# Patient Record
Sex: MALE | Race: WHITE | NOT HISPANIC OR LATINO | Employment: FULL TIME | URBAN - METROPOLITAN AREA
[De-identification: names, ages, dates, MRNs, and addresses within clinical notes are randomized per-mention and may not be internally consistent; named-entity substitution may affect disease eponyms.]

---

## 2017-10-05 ENCOUNTER — OFFICE VISIT (OUTPATIENT)
Dept: FAMILY MEDICINE CLINIC | Facility: CLINIC | Age: 52
End: 2017-10-05

## 2017-10-05 VITALS
SYSTOLIC BLOOD PRESSURE: 140 MMHG | DIASTOLIC BLOOD PRESSURE: 85 MMHG | HEART RATE: 63 BPM | OXYGEN SATURATION: 95 % | WEIGHT: 275 LBS | TEMPERATURE: 97.5 F

## 2017-10-05 DIAGNOSIS — Z00.00 HEALTH CARE MAINTENANCE: ICD-10-CM

## 2017-10-05 DIAGNOSIS — F17.200 SMOKING: ICD-10-CM

## 2017-10-05 DIAGNOSIS — R03.0 BORDERLINE BLOOD PRESSURE: ICD-10-CM

## 2017-10-05 DIAGNOSIS — G89.29 CHRONIC RIGHT-SIDED LOW BACK PAIN WITH RIGHT-SIDED SCIATICA: Primary | ICD-10-CM

## 2017-10-05 DIAGNOSIS — M54.41 CHRONIC RIGHT-SIDED LOW BACK PAIN WITH RIGHT-SIDED SCIATICA: Primary | ICD-10-CM

## 2017-10-05 DIAGNOSIS — R53.83 OTHER FATIGUE: ICD-10-CM

## 2017-10-05 LAB
ALBUMIN SERPL-MCNC: 4 G/DL (ref 3.5–5.2)
ALBUMIN/GLOB SERPL: 1.2 G/DL
ALP SERPL-CCNC: 68 U/L (ref 39–117)
ALT SERPL-CCNC: 13 U/L (ref 1–41)
AST SERPL-CCNC: 9 U/L (ref 1–40)
BASOPHILS # BLD AUTO: 0.05 10*3/MM3 (ref 0–0.2)
BASOPHILS NFR BLD AUTO: 0.6 % (ref 0–1.5)
BILIRUB SERPL-MCNC: 0.4 MG/DL (ref 0.1–1.2)
BUN SERPL-MCNC: 9 MG/DL (ref 6–20)
BUN/CREAT SERPL: 11.3 (ref 7–25)
CALCIUM SERPL-MCNC: 9.4 MG/DL (ref 8.6–10.5)
CHLORIDE SERPL-SCNC: 102 MMOL/L (ref 98–107)
CHOLEST SERPL-MCNC: 213 MG/DL (ref 0–200)
CO2 SERPL-SCNC: 26.3 MMOL/L (ref 22–29)
CREAT SERPL-MCNC: 0.8 MG/DL (ref 0.76–1.27)
EOSINOPHIL # BLD AUTO: 0.27 10*3/MM3 (ref 0–0.7)
EOSINOPHIL NFR BLD AUTO: 3.2 % (ref 0.3–6.2)
ERYTHROCYTE [DISTWIDTH] IN BLOOD BY AUTOMATED COUNT: 15.6 % (ref 11.5–14.5)
GLOBULIN SER CALC-MCNC: 3.3 GM/DL
GLUCOSE SERPL-MCNC: 83 MG/DL (ref 65–99)
HCT VFR BLD AUTO: 41.9 % (ref 40.4–52.2)
HDLC SERPL-MCNC: 37 MG/DL (ref 40–60)
HGB BLD-MCNC: 13.2 G/DL (ref 13.7–17.6)
IMM GRANULOCYTES # BLD: 0.02 10*3/MM3 (ref 0–0.03)
IMM GRANULOCYTES NFR BLD: 0.2 % (ref 0–0.5)
LDLC SERPL CALC-MCNC: 144 MG/DL (ref 0–100)
LYMPHOCYTES # BLD AUTO: 2.73 10*3/MM3 (ref 0.9–4.8)
LYMPHOCYTES NFR BLD AUTO: 32.8 % (ref 19.6–45.3)
MCH RBC QN AUTO: 25.8 PG (ref 27–32.7)
MCHC RBC AUTO-ENTMCNC: 31.5 G/DL (ref 32.6–36.4)
MCV RBC AUTO: 81.8 FL (ref 79.8–96.2)
MONOCYTES # BLD AUTO: 0.7 10*3/MM3 (ref 0.2–1.2)
MONOCYTES NFR BLD AUTO: 8.4 % (ref 5–12)
NEUTROPHILS # BLD AUTO: 4.55 10*3/MM3 (ref 1.9–8.1)
NEUTROPHILS NFR BLD AUTO: 54.8 % (ref 42.7–76)
PLATELET # BLD AUTO: 302 10*3/MM3 (ref 140–500)
POTASSIUM SERPL-SCNC: 4.4 MMOL/L (ref 3.5–5.2)
PROT SERPL-MCNC: 7.3 G/DL (ref 6–8.5)
RBC # BLD AUTO: 5.12 10*6/MM3 (ref 4.6–6)
SODIUM SERPL-SCNC: 142 MMOL/L (ref 136–145)
TRIGL SERPL-MCNC: 159 MG/DL (ref 0–150)
TSH SERPL DL<=0.005 MIU/L-ACNC: 1.19 MIU/ML (ref 0.27–4.2)
VIT B12 SERPL-MCNC: 655 PG/ML (ref 211–946)
VLDLC SERPL CALC-MCNC: 31.8 MG/DL (ref 5–40)
WBC # BLD AUTO: 8.32 10*3/MM3 (ref 4.5–10.7)

## 2017-10-05 PROCEDURE — 99203 OFFICE O/P NEW LOW 30 MIN: CPT | Performed by: FAMILY MEDICINE

## 2017-10-05 PROCEDURE — 99406 BEHAV CHNG SMOKING 3-10 MIN: CPT | Performed by: FAMILY MEDICINE

## 2017-10-05 RX ORDER — CETIRIZINE HYDROCHLORIDE 10 MG/1
10 TABLET ORAL EVERY MORNING
COMMUNITY

## 2017-10-05 RX ORDER — OMEPRAZOLE 20 MG/1
20 CAPSULE, DELAYED RELEASE ORAL DAILY
COMMUNITY
End: 2018-01-15 | Stop reason: HOSPADM

## 2017-10-05 RX ORDER — NAPROXEN 500 MG/1
500 TABLET ORAL 2 TIMES DAILY WITH MEALS
Qty: 60 TABLET | Refills: 5 | Status: SHIPPED | OUTPATIENT
Start: 2017-10-05 | End: 2018-01-12

## 2017-10-05 RX ORDER — SILDENAFIL 100 MG/1
100 TABLET, FILM COATED ORAL DAILY PRN
COMMUNITY

## 2017-10-05 RX ORDER — TIZANIDINE 4 MG/1
4 TABLET ORAL NIGHTLY PRN
Qty: 30 TABLET | Refills: 5 | Status: SHIPPED | OUTPATIENT
Start: 2017-10-05 | End: 2021-04-13

## 2017-10-05 NOTE — PATIENT INSTRUCTIONS
Back Exercises  The following exercises strengthen the muscles that help to support the back. They also help to keep the lower back flexible. Doing these exercises can help to prevent back pain or lessen existing pain.  If you have back pain or discomfort, try doing these exercises 2-3 times each day or as told by your health care provider. When the pain goes away, do them once each day, but increase the number of times that you repeat the steps for each exercise (do more repetitions). If you do not have back pain or discomfort, do these exercises once each day or as told by your health care provider.  EXERCISES  Single Knee to Chest  Repeat these steps 3-5 times for each le. Lie on your back on a firm bed or the floor with your legs extended.  2. Bring one knee to your chest. Your other leg should stay extended and in contact with the floor.  3. Hold your knee in place by grabbing your knee or thigh.  4. Pull on your knee until you feel a gentle stretch in your lower back.  5. Hold the stretch for 10-30 seconds.  6. Slowly release and straighten your leg.  Pelvic Tilt  Repeat these steps 5-10 times:  1. Lie on your back on a firm bed or the floor with your legs extended.  2. Bend your knees so they are pointing toward the ceiling and your feet are flat on the floor.  3. Tighten your lower abdominal muscles to press your lower back against the floor. This motion will tilt your pelvis so your tailbone points up toward the ceiling instead of pointing to your feet or the floor.  4. With gentle tension and even breathing, hold this position for 5-10 seconds.  Cat-Cow  Repeat these steps until your lower back becomes more flexible:  1. Get into a hands-and-knees position on a firm surface. Keep your hands under your shoulders, and keep your knees under your hips. You may place padding under your knees for comfort.  2. Let your head hang down, and point your tailbone toward the floor so your lower back becomes  rounded like the back of a cat.  3. Hold this position for 5 seconds.  4. Slowly lift your head and point your tailbone up toward the ceiling so your back forms a sagging arch like the back of a cow.  5. Hold this position for 5 seconds.  Press-Ups  Repeat these steps 5-10 times:  1. Lie on your abdomen (face-down) on the floor.  2. Place your palms near your head, about shoulder-width apart.  3. While you keep your back as relaxed as possible and keep your hips on the floor, slowly straighten your arms to raise the top half of your body and lift your shoulders. Do not use your back muscles to raise your upper torso. You may adjust the placement of your hands to make yourself more comfortable.  4. Hold this position for 5 seconds while you keep your back relaxed.  5. Slowly return to lying flat on the floor.  Bridges  Repeat these steps 10 times:  1. Lie on your back on a firm surface.  2. Bend your knees so they are pointing toward the ceiling and your feet are flat on the floor.  3. Tighten your buttocks muscles and lift your buttocks off of the floor until your waist is at almost the same height as your knees. You should feel the muscles working in your buttocks and the back of your thighs. If you do not feel these muscles, slide your feet 1-2 inches farther away from your buttocks.  4. Hold this position for 3-5 seconds.  5. Slowly lower your hips to the starting position, and allow your buttocks muscles to relax completely.  If this exercise is too easy, try doing it with your arms crossed over your chest.  Abdominal Crunches  Repeat these steps 5-10 times:  1. Lie on your back on a firm bed or the floor with your legs extended.  2. Bend your knees so they are pointing toward the ceiling and your feet are flat on the floor.  3. Cross your arms over your chest.  4. Tip your chin slightly toward your chest without bending your neck.  5. Tighten your abdominal muscles and slowly raise your trunk (torso) high  enough to lift your shoulder blades a tiny bit off of the floor. Avoid raising your torso higher than that, because it can put too much stress on your low back and it does not help to strengthen your abdominal muscles.  6. Slowly return to your starting position.  Back Lifts  Repeat these steps 5-10 times:  1. Lie on your abdomen (face-down) with your arms at your sides, and rest your forehead on the floor.  2. Tighten the muscles in your legs and your buttocks.  3. Slowly lift your chest off of the floor while you keep your hips pressed to the floor. Keep the back of your head in line with the curve in your back. Your eyes should be looking at the floor.  4. Hold this position for 3-5 seconds.  5. Slowly return to your starting position.  SEEK MEDICAL CARE IF:  · Your back pain or discomfort gets much worse when you do an exercise.  · Your back pain or discomfort does not lessen within 2 hours after you exercise.  If you have any of these problems, stop doing these exercises right away. Do not do them again unless your health care provider says that you can.  SEEK IMMEDIATE MEDICAL CARE IF:  · You develop sudden, severe back pain. If this happens, stop doing the exercises right away. Do not do them again unless your health care provider says that you can.     This information is not intended to replace advice given to you by your health care provider. Make sure you discuss any questions you have with your health care provider.     Document Released: 01/25/2006 Document Revised: 09/07/2016 Document Reviewed: 02/11/2016  [employer] Interactive Patient Education ©2017 [employer]    Bupropion sustained-release tablets (smoking cessation)  What is this medicine?  BUPROPION (byoo PROE pee on) is used to help people quit smoking.  This medicine may be used for other purposes; ask your health care provider or pharmacist if you have questions.  COMMON BRAND NAME(S): Rica Barton  What should I tell my health care  provider before I take this medicine?  They need to know if you have any of these conditions:  -an eating disorder, such as anorexia or bulimia  -bipolar disorder or psychosis  -diabetes or high blood sugar, treated with medication  -glaucoma  -head injury or brain tumor  -heart disease, previous heart attack, or irregular heart beat  -high blood pressure  -kidney or liver disease  -seizures  -suicidal thoughts or a previous suicide attempt  -Tourette's syndrome  -weight loss  -an unusual or allergic reaction to bupropion, other medicines, foods, dyes, or preservatives  -breast-feeding  -pregnant or trying to become pregnant  How should I use this medicine?  Take this medicine by mouth with a glass of water. Follow the directions on the prescription label. You can take it with or without food. If it upsets your stomach, take it with food. Do not cut, crush or chew this medicine. Take your medicine at regular intervals. If you take this medicine more than once a day, take your second dose at least 8 hours after you take your first dose. To limit difficulty in sleeping, avoid taking this medicine at bedtime. Do not take your medicine more often than directed. Do not stop taking this medicine suddenly except upon the advice of your doctor. Stopping this medicine too quickly may cause serious side effects.  A special MedGuide will be given to you by the pharmacist with each prescription and refill. Be sure to read this information carefully each time.  Talk to your pediatrician regarding the use of this medicine in children. Special care may be needed.  Overdosage: If you think you have taken too much of this medicine contact a poison control center or emergency room at once.  NOTE: This medicine is only for you. Do not share this medicine with others.  What if I miss a dose?  If you miss a dose, skip the missed dose and take your next tablet at the regular time. There should be at least 8 hours between doses. Do not  take double or extra doses.  What may interact with this medicine?  Do not take this medicine with any of the following medications:  -linezolid  -MAOIs like Azilect, Carbex, Eldepryl, Marplan, Nardil, and Parnate  -methylene blue (injected into a vein)  -other medicines that contain bupropion like Wellbutrin  This medicine may also interact with the following medications:  -alcohol  -certain medicines for anxiety or sleep  -certain medicines for blood pressure like metoprolol, propranolol  -certain medicines for depression or psychotic disturbances  -certain medicines for HIV or AIDS like efavirenz, lopinavir, nelfinavir, ritonavir  -certain medicines for irregular heart beat like propafenone, flecainide  -certain medicines for Parkinson's disease like amantadine, levodopa  -certain medicines for seizures like carbamazepine, phenytoin, phenobarbital  -cimetidine  -clopidogrel  -cyclophosphamide  -furazolidone  -isoniazid  -nicotine  -orphenadrine  -procarbazine  -steroid medicines like prednisone or cortisone  -stimulant medicines for attention disorders, weight loss, or to stay awake  -tamoxifen  -theophylline  -thiotepa  -ticlopidine  -tramadol  -warfarin  This list may not describe all possible interactions. Give your health care provider a list of all the medicines, herbs, non-prescription drugs, or dietary supplements you use. Also tell them if you smoke, drink alcohol, or use illegal drugs. Some items may interact with your medicine.  What should I watch for while using this medicine?  Visit your doctor or health care professional for regular checks on your progress. This medicine should be used together with a patient support program. It is important to participate in a behavioral program, counseling, or other support program that is recommended by your health care professional.  Patients and their families should watch out for new or worsening thoughts of suicide or depression. Also watch out for sudden  changes in feelings such as feeling anxious, agitated, panicky, irritable, hostile, aggressive, impulsive, severely restless, overly excited and hyperactive, or not being able to sleep. If this happens, especially at the beginning of treatment or after a change in dose, call your health care professional.  Avoid alcoholic drinks while taking this medicine. Drinking excessive alcoholic beverages, using sleeping or anxiety medicines, or quickly stopping the use of these agents while taking this medicine may increase your risk for a seizure.  Do not drive or use heavy machinery until you know how this medicine affects you. This medicine can impair your ability to perform these tasks.  Do not take this medicine close to bedtime. It may prevent you from sleeping.  Your mouth may get dry. Chewing sugarless gum or sucking hard candy, and drinking plenty of water may help. Contact your doctor if the problem does not go away or is severe.  Do not use nicotine patches or chewing gum without the advice of your doctor or health care professional while taking this medicine. You may need to have your blood pressure taken regularly if your doctor recommends that you use both nicotine and this medicine together.  What side effects may I notice from receiving this medicine?  Side effects that you should report to your doctor or health care professional as soon as possible:  -allergic reactions like skin rash, itching or hives, swelling of the face, lips, or tongue  -breathing problems  -changes in vision  -confusion  -fast or irregular heartbeat  -hallucinations  -increased blood pressure  -redness, blistering, peeling or loosening of the skin, including inside the mouth  -seizures  -suicidal thoughts or other mood changes  -unusually weak or tired  -vomiting  Side effects that usually do not require medical attention (report to your doctor or health care professional if they continue or are bothersome):  -change in sex drive or  performance  -constipation  -headache  -loss of appetite  -nausea  -tremors  -weight loss  This list may not describe all possible side effects. Call your doctor for medical advice about side effects. You may report side effects to FDA at 2-690-HMV-7013.  Where should I keep my medicine?  Keep out of the reach of children.  Store at room temperature between 20 and 25 degrees C (68 and 77 degrees F). Protect from light. Keep container tightly closed. Throw away any unused medicine after the expiration date.  NOTE: This sheet is a summary. It may not cover all possible information. If you have questions about this medicine, talk to your doctor, pharmacist, or health care provider.     © 2017, [employer]. (2014-08-15 10:55:10)    Calorie Counting for Weight Loss  Calories are energy you get from the things you eat and drink. Your body uses this energy to keep you going throughout the day. The number of calories you eat affects your weight. When you eat more calories than your body needs, your body stores the extra calories as fat. When you eat fewer calories than your body needs, your body burns fat to get the energy it needs.  Calorie counting means keeping track of how many calories you eat and drink each day. If you make sure to eat fewer calories than your body needs, you should lose weight. In order for calorie counting to work, you will need to eat the number of calories that are right for you in a day to lose a healthy amount of weight per week. A healthy amount of weight to lose per week is usually 1-2 lb (0.5-0.9 kg). A dietitian can determine how many calories you need in a day and give you suggestions on how to reach your calorie goal.   WHAT IS MY MY PLAN?  My goal is to have __________ calories per day.   If I have this many calories per day, I should lose around __________ pounds per week.  WHAT DO I NEED TO KNOW ABOUT CALORIE COUNTING?  In order to meet your daily calorie goal, you will need  to:  · Find out how many calories are in each food you would like to eat. Try to do this before you eat.  · Decide how much of the food you can eat.  · Write down what you ate and how many calories it had. Doing this is called keeping a food log.  WHERE DO I FIND CALORIE INFORMATION?  The number of calories in a food can be found on a Nutrition Facts label. Note that all the information on a label is based on a specific serving of the food. If a food does not have a Nutrition Facts label, try to look up the calories online or ask your dietitian for help.  HOW DO I DECIDE HOW MUCH TO EAT?  To decide how much of the food you can eat, you will need to consider both the number of calories in one serving and the size of one serving. This information can be found on the Nutrition Facts label. If a food does not have a Nutrition Facts label, look up the information online or ask your dietitian for help.  Remember that calories are listed per serving. If you choose to have more than one serving of a food, you will have to multiply the calories per serving by the amount of servings you plan to eat. For example, the label on a package of bread might say that a serving size is 1 slice and that there are 90 calories in a serving. If you eat 1 slice, you will have eaten 90 calories. If you eat 2 slices, you will have eaten 180 calories.  HOW DO I KEEP A FOOD LOG?  After each meal, record the following information in your food log:  · What you ate.  · How much of it you ate.  · How many calories it had.  · Then, add up your calories.  Keep your food log near you, such as in a small notebook in your pocket. Another option is to use a mobile martin or website. Some programs will calculate calories for you and show you how many calories you have left each time you add an item to the log.  WHAT ARE SOME CALORIE COUNTING TIPS?  · Use your calories on foods and drinks that will fill you up and not leave you hungry. Some examples of this  include foods like nuts and nut butters, vegetables, lean proteins, and high-fiber foods (more than 5 g fiber per serving).  · Eat nutritious foods and avoid empty calories. Empty calories are calories you get from foods or beverages that do not have many nutrients, such as candy and soda. It is better to have a nutritious high-calorie food (such as an avocado) than a food with few nutrients (such as a bag of chips).  · Know how many calories are in the foods you eat most often. This way, you do not have to look up how many calories they have each time you eat them.  · Look out for foods that may seem like low-calorie foods but are really high-calorie foods, such as baked goods, soda, and fat-free candy.  · Pay attention to calories in drinks. Drinks such as sodas, specialty coffee drinks, alcohol, and juices have a lot of calories yet do not fill you up. Choose low-calorie drinks like water and diet drinks.  · Focus your calorie counting efforts on higher calorie items. Logging the calories in a garden salad that contains only vegetables is less important than calculating the calories in a milk shake.  · Find a way of tracking calories that works for you. Get creative. Most people who are successful find ways to keep track of how much they eat in a day, even if they do not count every calorie.  WHAT ARE SOME PORTION CONTROL TIPS?  · Know how many calories are in a serving. This will help you know how many servings of a certain food you can have.  · Use a measuring cup to measure serving sizes. This is helpful when you start out. With time, you will be able to estimate serving sizes for some foods.  · Take some time to put servings of different foods on your favorite plates, bowls, and cups so you know what a serving looks like.  · Try not to eat straight from a bag or box. Doing this can lead to overeating. Put the amount you would like to eat in a cup or on a plate to make sure you are eating the right  "portion.  · Use smaller plates, glasses, and bowls to prevent overeating. This is a quick and easy way to practice portion control. If your plate is smaller, less food can fit on it.  · Try not to multitask while eating, such as watching TV or using your computer. If it is time to eat, sit down at a table and enjoy your food. Doing this will help you to start recognizing when you are full. It will also make you more aware of what and how much you are eating.  HOW CAN I CALORIE COUNT WHEN EATING OUT?  · Ask for smaller portion sizes or child-sized portions.  · Consider sharing an entree and sides instead of getting your own entree.  · If you get your own entree, eat only half. Ask for a box at the beginning of your meal and put the rest of your entree in it so you are not tempted to eat it.  · Look for the calories on the menu. If calories are listed, choose the lower calorie options.  · Choose dishes that include vegetables, fruits, whole grains, low-fat dairy products, and lean protein. Focusing on smart food choices from each of the 5 food groups can help you stay on track at restaurants.  · Choose items that are boiled, broiled, grilled, or steamed.  · Choose water, milk, unsweetened iced tea, or other drinks without added sugars. If you want an alcoholic beverage, choose a lower calorie option. For example, a regular lauren can have up to 700 calories and a glass of wine has around 150.  · Stay away from items that are buttered, battered, fried, or served with cream sauce. Items labeled \"crispy\" are usually fried, unless stated otherwise.  · Ask for dressings, sauces, and syrups on the side. These are usually very high in calories, so do not eat much of them.  · Watch out for salads. Many people think salads are a healthy option, but this is often not the case. Many salads come with shankar, fried chicken, lots of cheese, fried chips, and dressing. All of these items have a lot of calories. If you want a salad, " choose a garden salad and ask for grilled meats or steak. Ask for the dressing on the side, or ask for olive oil and vinegar or lemon to use as dressing.  · Estimate how many servings of a food you are given. For example, a serving of cooked rice is ½ cup or about the size of half a tennis ball or one cupcake wrapper. Knowing serving sizes will help you be aware of how much food you are eating at restaurants. The list below tells you how big or small some common portion sizes are based on everyday objects.    1 oz--4 stacked dice.    3 oz--1 deck of cards.    1 tsp--1 dice.    1 Tbsp--½ a Ping-Pong ball.    2 Tbsp--1 Ping-Pong ball.    ½ cup--1 tennis ball or 1 cupcake wrapper.    1 cup--1 baseball.     This information is not intended to replace advice given to you by your health care provider. Make sure you discuss any questions you have with your health care provider.     Document Released: 12/18/2006 Document Revised: 01/08/2016 Document Reviewed: 10/23/2014  [employer] Interactive Patient Education ©2017 [employer]      Exercising to Lose Weight  Exercising can help you to lose weight. In order to lose weight through exercise, you need to do vigorous-intensity exercise. You can tell that you are exercising with vigorous intensity if you are breathing very hard and fast and cannot hold a conversation while exercising.  Moderate-intensity exercise helps to maintain your current weight. You can tell that you are exercising at a moderate level if you have a higher heart rate and faster breathing, but you are still able to hold a conversation.  HOW OFTEN SHOULD I EXERCISE?  Choose an activity that you enjoy and set realistic goals. Your health care provider can help you to make an activity plan that works for you. Exercise regularly as directed by your health care provider. This may include:  · Doing resistance training twice each week, such as:    Push-ups.    Sit-ups.    Lifting weights.    Using resistance  bands.  · Doing a given intensity of exercise for a given amount of time. Choose from these options:    150 minutes of moderate-intensity exercise every week.    75 minutes of vigorous-intensity exercise every week.    A mix of moderate-intensity and vigorous-intensity exercise every week.  Children, pregnant women, people who are out of shape, people who are overweight, and older adults may need to consult a health care provider for individual recommendations. If you have any sort of medical condition, be sure to consult your health care provider before starting a new exercise program.  WHAT ARE SOME ACTIVITIES THAT CAN HELP ME TO LOSE WEIGHT?   · Walking at a rate of at least 4.5 miles an hour.  · Jogging or running at a rate of 5 miles per hour.  · Biking at a rate of at least 10 miles per hour.  · Lap swimming.  · Roller-skating or in-line skating.  · Cross-country skiing.  · Vigorous competitive sports, such as football, basketball, and soccer.  · Jumping rope.  · Aerobic dancing.  HOW CAN I BE MORE ACTIVE IN MY DAY-TO-DAY ACTIVITIES?  · Use the stairs instead of the elevator.  · Take a walk during your lunch break.  · If you drive, park your car farther away from work or school.  · If you take public transportation, get off one stop early and walk the rest of the way.  · Make all of your phone calls while standing up and walking around.  · Get up, stretch, and walk around every 30 minutes throughout the day.  WHAT GUIDELINES SHOULD I FOLLOW WHILE EXERCISING?  · Do not exercise so much that you hurt yourself, feel dizzy, or get very short of breath.  · Consult your health care provider prior to starting a new exercise program.  · Wear comfortable clothes and shoes with good support.  · Drink plenty of water while you exercise to prevent dehydration or heat stroke. Body water is lost during exercise and must be replaced.  · Work out until you breathe faster and your heart beats faster.     This information is  not intended to replace advice given to you by your health care provider. Make sure you discuss any questions you have with your health care provider.     Document Released: 01/20/2012 Document Revised: 01/08/2016 Document Reviewed: 05/21/2015  [employer] Interactive Patient Education ©2017 [employer]

## 2017-10-05 NOTE — PROGRESS NOTES
Subjective   Miles Munoz is a 52 y.o. male. Presents today for   Chief Complaint   Patient presents with   • Establish Care     legs cramping and has a bad back.  Lesion on lt forearm needs checked.       New patient here to establish care.    Rash   This is a new problem. The current episode started more than 1 month ago. The problem is unchanged. Location: left forearm wart;  unchaning;  irritated adn scaling; The rash is characterized by dryness. Associated symptoms include fatigue and shortness of breath (Since gained weight). Pertinent negatives include no vomiting. Past treatments include nothing. The treatment provided no relief.   Back Pain   This is a chronic (Reports told DDD L4-L5) problem. Episode onset: 15 years. The problem occurs intermittently. The problem has been waxing and waning since onset. The pain is present in the lumbar spine. The quality of the pain is described as aching (stiffness). The pain radiates to the right thigh (occly sciatica). The pain is moderate. The symptoms are aggravated by twisting, position and bending. Associated symptoms include leg pain, numbness (right 4th/5th toes) and tingling. Pertinent negatives include no abdominal pain, chest pain, paresthesias or weakness. He has tried NSAIDs for the symptoms. The treatment provided mild relief.   Nicotine Dependence   Presents for initial visit. Symptoms include fatigue. Preferred tobacco types include cigarettes. His urge triggers include company of smokers. (Afraid to quit smoking as may gain more wait.) He started smoking when he was 11-12 years old. Past treatments include varenicline and nicotine patch (Reports chantix caused vivid dreams.). Miles is thinking about quitting. Miles has tried to quit 5 or more (At one point quit for 18 months to 24 monrths) times.   +snores, fatigue, especially with weight gain;       Preventative:  Has not had colonoscopy  Urology checks prostate, sees annually    Review of Systems    Constitutional: Positive for fatigue.   Respiratory: Positive for shortness of breath (Since gained weight).    Cardiovascular: Negative for chest pain, palpitations and leg swelling.   Gastrointestinal: Negative for abdominal pain, anal bleeding, blood in stool, nausea and vomiting.   Musculoskeletal: Positive for back pain.   Skin: Positive for rash.   Neurological: Positive for tingling and numbness (right 4th/5th toes). Negative for dizziness, syncope, weakness, light-headedness and paresthesias.       The following portions of the patient's history were reviewed and updated as appropriate: allergies, current medications, past family history, past medical history, past social history, past surgical history and problem list.    Past Medical History:   Diagnosis Date   • Allergic rhinitis    • GERD (gastroesophageal reflux disease)    • Renal stone      Past Surgical History:   Procedure Laterality Date   • CYSTOSCOPY      for renal stones   • VASECTOMY     • WISDOM TOOTH EXTRACTION       Family History   Problem Relation Age of Onset   • Heart failure Mother      43 yoa   • Obesity Mother      reports very severe morbid obesity     Social History     Social History   • Marital status:      Spouse name: N/A   • Number of children: N/A   • Years of education: N/A     Social History Main Topics   • Smoking status: Current Every Day Smoker     Packs/day: 1.00     Types: Cigarettes     Start date: 10/5/1976   • Smokeless tobacco: Never Used   • Alcohol use Yes      Comment: occ   • Drug use: None   • Sexual activity: Not Asked     Other Topics Concern   • None     Social History Narrative   • None       There is no problem list on file for this patient.      Allergies   Allergen Reactions   • Prednisone Other (See Comments)       No current outpatient prescriptions on file prior to visit.     No current facility-administered medications on file prior to visit.        Objective   Vitals:    10/05/17 1120  10/05/17 1204   BP: 156/90 140/85   Pulse: 63    Temp: 97.5 °F (36.4 °C)    SpO2: 95%    Weight: 275 lb (125 kg)        Physical Exam   Constitutional: He appears well-developed and well-nourished.   HENT:   Head: Normocephalic and atraumatic.   Right Ear: External ear normal.   Left Ear: External ear normal.   Mouth/Throat: Oropharynx is clear and moist.   Neck: Neck supple. No JVD present. No thyromegaly present.   Cardiovascular: Normal rate, regular rhythm and normal heart sounds.  Exam reveals no gallop and no friction rub.    No murmur heard.  Pulmonary/Chest: Effort normal and breath sounds normal. No respiratory distress. He has no wheezes. He has no rales.   Abdominal: Soft. Bowel sounds are normal. He exhibits no distension. There is no tenderness. There is no rebound and no guarding.   Musculoskeletal: He exhibits no edema.   Neurological: He is alert.   Skin: Skin is warm and dry.   Psychiatric: He has a normal mood and affect. His behavior is normal.   Nursing note and vitals reviewed.      Assessment/Plan   Miles was seen today for establish care.    Diagnoses and all orders for this visit:    Chronic right-sided low back pain with right-sided sciatica  -     naproxen (NAPROSYN) 500 MG tablet; Take 1 tablet by mouth 2 (Two) Times a Day With Meals.  -     tiZANidine (ZANAFLEX) 4 MG tablet; Take 1 tablet by mouth At Night As Needed for Muscle Spasms.    Borderline blood pressure  -     Comprehensive Metabolic Panel    Smoking    Other fatigue  -     CBC & Differential  -     Comprehensive Metabolic Panel  -     Lipid Panel  -     TSH  -     Vitamin B12  -     Ambulatory Referral to Sleep Medicine    Health care maintenance  -     Comprehensive Metabolic Panel  -     Lipid Panel      -I advised the patient of the risks in continuing to use tobacco, and I provided this patient with smoking cessation Rx.  I also discussed how to quit smoking and the patient has expressed the willingness to quit.    -During  this visit, I spent 3-10 mintues counseling the patient regarding smoking cessation.   -back pain - nsaid, MR;  Stretches;  COnsider PT  -fatigue - labs as indicated, will call with results  -wart - monitor, return if changing  -BP borderline - work on DASH diet, exercise and weight loss  -recommend c-scope, declines at this time;  D/w at f/u again.       -Follow up: 6 months       Current Outpatient Prescriptions:   •  cetirizine (zyrTEC) 10 MG tablet, Take 10 mg by mouth Daily., Disp: , Rfl:   •  naproxen (NAPROSYN) 500 MG tablet, Take 1 tablet by mouth 2 (Two) Times a Day With Meals., Disp: 60 tablet, Rfl: 5  •  omeprazole (priLOSEC) 20 MG capsule, Take 20 mg by mouth Daily., Disp: , Rfl:   •  sildenafil (VIAGRA) 100 MG tablet, Take 100 mg by mouth Daily As Needed for erectile dysfunction., Disp: , Rfl:   •  tiZANidine (ZANAFLEX) 4 MG tablet, Take 1 tablet by mouth At Night As Needed for Muscle Spasms., Disp: 30 tablet, Rfl: 5

## 2017-10-06 NOTE — PROGRESS NOTES
Call and mail copy of results to patient.  Kidney, liver function normal  B12 and thyroid normal  Cholesterol elevated, work on diet, exercise and weight loss  Blood counts ok, but hgb borderline for age, would check FOBT x 3 and for colorectal cancer screening.

## 2017-10-17 ENCOUNTER — CLINICAL SUPPORT (OUTPATIENT)
Dept: FAMILY MEDICINE CLINIC | Facility: CLINIC | Age: 52
End: 2017-10-17

## 2017-10-17 DIAGNOSIS — Z12.11 SCREENING FOR COLORECTAL CANCER: Primary | ICD-10-CM

## 2017-10-17 DIAGNOSIS — Z12.12 SCREENING FOR COLORECTAL CANCER: Primary | ICD-10-CM

## 2017-10-17 DIAGNOSIS — Z12.11 COLON CANCER SCREENING: Primary | ICD-10-CM

## 2017-10-17 LAB
EXPIRATION DATE 2: ABNORMAL
EXPIRATION DATE 3: ABNORMAL
EXPIRATION DATE: ABNORMAL
GASTROCULT GAST QL: POSITIVE
HEMOCCULT SP2 STL QL: NEGATIVE
HEMOCCULT SP3 STL QL: NEGATIVE
Lab: ABNORMAL

## 2017-10-17 PROCEDURE — 82274 ASSAY TEST FOR BLOOD FECAL: CPT | Performed by: FAMILY MEDICINE

## 2017-10-17 NOTE — PROGRESS NOTES
Call results to patient.  1 of 3 FOBT +, recommend referral to GI for colonoscopy for colon cancer screening.

## 2017-11-16 ENCOUNTER — PREP FOR SURGERY (OUTPATIENT)
Dept: OTHER | Facility: HOSPITAL | Age: 52
End: 2017-11-16

## 2017-11-16 DIAGNOSIS — R19.5 OCCULT BLOOD POSITIVE STOOL: ICD-10-CM

## 2017-11-16 DIAGNOSIS — K21.9 GASTROESOPHAGEAL REFLUX DISEASE, ESOPHAGITIS PRESENCE NOT SPECIFIED: Primary | ICD-10-CM

## 2017-11-16 RX ORDER — SODIUM CHLORIDE, SODIUM LACTATE, POTASSIUM CHLORIDE, CALCIUM CHLORIDE 600; 310; 30; 20 MG/100ML; MG/100ML; MG/100ML; MG/100ML
30 INJECTION, SOLUTION INTRAVENOUS CONTINUOUS
Status: CANCELLED | OUTPATIENT
Start: 2017-11-16

## 2017-11-21 ENCOUNTER — APPOINTMENT (OUTPATIENT)
Dept: SLEEP MEDICINE | Facility: HOSPITAL | Age: 52
End: 2017-11-21
Attending: PSYCHIATRY & NEUROLOGY

## 2017-12-01 PROBLEM — K21.9 GASTROESOPHAGEAL REFLUX DISEASE: Status: ACTIVE | Noted: 2017-12-01

## 2017-12-01 PROBLEM — R19.5 OCCULT BLOOD POSITIVE STOOL: Status: ACTIVE | Noted: 2017-12-01

## 2018-01-11 ENCOUNTER — TELEPHONE (OUTPATIENT)
Dept: FAMILY MEDICINE CLINIC | Facility: CLINIC | Age: 53
End: 2018-01-11

## 2018-01-11 DIAGNOSIS — Z12.11 ENCOUNTER FOR SCREENING COLONOSCOPY: Primary | ICD-10-CM

## 2018-01-12 RX ORDER — ACETAMINOPHEN 325 MG/1
650 TABLET ORAL EVERY 6 HOURS PRN
COMMUNITY
End: 2022-05-05

## 2018-01-15 ENCOUNTER — ANESTHESIA EVENT (OUTPATIENT)
Dept: GASTROENTEROLOGY | Facility: HOSPITAL | Age: 53
End: 2018-01-15

## 2018-01-15 ENCOUNTER — HOSPITAL ENCOUNTER (OUTPATIENT)
Facility: HOSPITAL | Age: 53
Setting detail: HOSPITAL OUTPATIENT SURGERY
Discharge: HOME | End: 2018-01-15
Attending: INTERNAL MEDICINE | Admitting: INTERNAL MEDICINE

## 2018-01-15 ENCOUNTER — ANESTHESIA (OUTPATIENT)
Dept: GASTROENTEROLOGY | Facility: HOSPITAL | Age: 53
End: 2018-01-15

## 2018-01-15 VITALS
SYSTOLIC BLOOD PRESSURE: 139 MMHG | RESPIRATION RATE: 20 BRPM | TEMPERATURE: 98.4 F | BODY MASS INDEX: 41.76 KG/M2 | HEART RATE: 86 BPM | WEIGHT: 266.06 LBS | DIASTOLIC BLOOD PRESSURE: 75 MMHG | HEIGHT: 67 IN | OXYGEN SATURATION: 93 %

## 2018-01-15 DIAGNOSIS — K21.9 GASTROESOPHAGEAL REFLUX DISEASE, ESOPHAGITIS PRESENCE NOT SPECIFIED: ICD-10-CM

## 2018-01-15 DIAGNOSIS — R19.5 OCCULT BLOOD POSITIVE STOOL: ICD-10-CM

## 2018-01-15 PROCEDURE — 88305 TISSUE EXAM BY PATHOLOGIST: CPT | Performed by: INTERNAL MEDICINE

## 2018-01-15 PROCEDURE — 25010000002 PROPOFOL 10 MG/ML EMULSION: Performed by: ANESTHESIOLOGY

## 2018-01-15 PROCEDURE — 45381 COLONOSCOPY SUBMUCOUS NJX: CPT | Performed by: INTERNAL MEDICINE

## 2018-01-15 PROCEDURE — 88312 SPECIAL STAINS GROUP 1: CPT | Performed by: INTERNAL MEDICINE

## 2018-01-15 PROCEDURE — 45380 COLONOSCOPY AND BIOPSY: CPT | Performed by: INTERNAL MEDICINE

## 2018-01-15 PROCEDURE — 25010000002 GLUCAGON (HUMAN RECOMBINANT) 1 MG RECONSTITUTED SOLUTION: Performed by: INTERNAL MEDICINE

## 2018-01-15 PROCEDURE — 43239 EGD BIOPSY SINGLE/MULTIPLE: CPT | Performed by: INTERNAL MEDICINE

## 2018-01-15 PROCEDURE — 45385 COLONOSCOPY W/LESION REMOVAL: CPT | Performed by: INTERNAL MEDICINE

## 2018-01-15 RX ORDER — SODIUM CHLORIDE, SODIUM LACTATE, POTASSIUM CHLORIDE, CALCIUM CHLORIDE 600; 310; 30; 20 MG/100ML; MG/100ML; MG/100ML; MG/100ML
30 INJECTION, SOLUTION INTRAVENOUS CONTINUOUS
Status: DISCONTINUED | OUTPATIENT
Start: 2018-01-15 | End: 2018-01-15 | Stop reason: HOSPADM

## 2018-01-15 RX ORDER — PROPOFOL 10 MG/ML
VIAL (ML) INTRAVENOUS CONTINUOUS PRN
Status: DISCONTINUED | OUTPATIENT
Start: 2018-01-15 | End: 2018-01-15 | Stop reason: SURG

## 2018-01-15 RX ORDER — PANTOPRAZOLE SODIUM 40 MG/1
40 TABLET, DELAYED RELEASE ORAL DAILY
Qty: 60 TABLET | Refills: 11 | Status: SHIPPED | OUTPATIENT
Start: 2018-01-15 | End: 2018-04-09 | Stop reason: CLARIF

## 2018-01-15 RX ORDER — SODIUM CHLORIDE 0.9 % (FLUSH) 0.9 %
1-10 SYRINGE (ML) INJECTION AS NEEDED
Status: DISCONTINUED | OUTPATIENT
Start: 2018-01-15 | End: 2018-01-15 | Stop reason: HOSPADM

## 2018-01-15 RX ORDER — PROPOFOL 10 MG/ML
VIAL (ML) INTRAVENOUS AS NEEDED
Status: DISCONTINUED | OUTPATIENT
Start: 2018-01-15 | End: 2018-01-15 | Stop reason: SURG

## 2018-01-15 RX ORDER — LIDOCAINE HYDROCHLORIDE 20 MG/ML
INJECTION, SOLUTION INFILTRATION; PERINEURAL AS NEEDED
Status: DISCONTINUED | OUTPATIENT
Start: 2018-01-15 | End: 2018-01-15 | Stop reason: SURG

## 2018-01-15 RX ADMIN — SODIUM CHLORIDE, POTASSIUM CHLORIDE, SODIUM LACTATE AND CALCIUM CHLORIDE: 600; 310; 30; 20 INJECTION, SOLUTION INTRAVENOUS at 10:00

## 2018-01-15 RX ADMIN — LIDOCAINE HYDROCHLORIDE 40 MG: 20 INJECTION, SOLUTION INFILTRATION; PERINEURAL at 10:00

## 2018-01-15 RX ADMIN — PROPOFOL 100 MCG/KG/MIN: 10 INJECTION, EMULSION INTRAVENOUS at 10:00

## 2018-01-15 RX ADMIN — SODIUM CHLORIDE, POTASSIUM CHLORIDE, SODIUM LACTATE AND CALCIUM CHLORIDE 30 ML/HR: 600; 310; 30; 20 INJECTION, SOLUTION INTRAVENOUS at 09:05

## 2018-01-15 RX ADMIN — PROPOFOL 100 MG: 10 INJECTION, EMULSION INTRAVENOUS at 10:00

## 2018-01-15 NOTE — ANESTHESIA PREPROCEDURE EVALUATION
" Anesthesia Evaluation            Airway   Mallampati: II  TM distance: >3 FB  Neck ROM: full  no difficulty expected  Dental - normal exam     Pulmonary    (+) a smoker Current, sleep apnea (probable),     ROS comment: Dry cough related to \"allergies\"  Cardiovascular     (-) hypertension, dysrhythmias, angina, BLACK, hyperlipidemia      Neuro/Psych  GI/Hepatic/Renal/Endo    (+) obesity,  GERD, renal disease stones,   (-) diabetes    Musculoskeletal     Abdominal    Substance History      OB/GYN          Other                                              Anesthesia Plan    ASA 3     MAC     intravenous induction   Anesthetic plan and risks discussed with patient.      "

## 2018-01-15 NOTE — H&P
"[facility]  Pre Procedure History & Physical    Chief Complaint:   Time for my colonoscopy    Subjective     HPI:   52 y.o. male presenting for evaluation of +FOBT.  1/3 positive done though PCP.  Pt denies over GI bleeding.  He has long standing GERD relatively well controlled on daily Prilosec.  No family hx of colon CA or polyps.      Past Medical History:   Past Medical History:   Diagnosis Date   • Allergic rhinitis    • GERD (gastroesophageal reflux disease)    • Renal stone    • Seasonal allergies        Family History:  Family History   Problem Relation Age of Onset   • Heart failure Mother      43 yoa   • Obesity Mother      reports very severe morbid obesity   • Malig Hyperthermia Neg Hx        Social History:   reports that he has been smoking Cigarettes.  He started smoking about 41 years ago. He has been smoking about 1.00 pack per day. He has never used smokeless tobacco. He reports that he drinks alcohol. He reports that he does not use illicit drugs.    Medications:   Prescriptions Prior to Admission   Medication Sig Dispense Refill Last Dose   • acetaminophen (TYLENOL) 325 MG tablet Take 650 mg by mouth Every 6 (Six) Hours As Needed for Mild Pain .   Past Week at Unknown time   • cetirizine (zyrTEC) 10 MG tablet Take 10 mg by mouth Daily.   1/14/2018 at 0900   • omeprazole (priLOSEC) 20 MG capsule Take 20 mg by mouth Daily.   1/14/2018 at 0900   • sildenafil (VIAGRA) 100 MG tablet Take 100 mg by mouth Daily As Needed for erectile dysfunction.   Past Week at Unknown time   • tiZANidine (ZANAFLEX) 4 MG tablet Take 1 tablet by mouth At Night As Needed for Muscle Spasms. 30 tablet 5 Past Week at Unknown time       Allergies:  Prednisone    ROS:    Pertinent items are noted in HPI     Objective     Blood pressure 159/72, pulse 78, temperature 98.6 °F (37 °C), temperature source Oral, resp. rate 16, height 170.2 cm (67\"), weight 121 kg (266 lb 1 oz), SpO2 96 %.    Physical Exam "   Constitutional: Pt is oriented to person, place, and time and well-developed, well-nourished, and in no distress.   HENT:   Mouth/Throat: Oropharynx is clear and moist.   Neck: Normal range of motion. Neck supple.   Cardiovascular: Normal rate, regular rhythm and normal heart sounds.    Pulmonary/Chest: Effort normal and breath sounds normal. No respiratory distress. No  wheezes.   Abdominal: Soft. Bowel sounds are normal.   Skin: Skin is warm and dry.   Psychiatric: Mood, memory, affect and judgment normal.     Assessment/Plan     Diagnosis:  Heartburn  +FOBT    Anticipated Surgical Procedure:  EGD and Colonoscopy    The risks, benefits, and alternatives of this procedure have been discussed with the patient or the responsible party- the patient understands and agrees to proceed.

## 2018-01-15 NOTE — DISCHARGE INSTRUCTIONS
For the next 24 hours patient needs to be with a responsible adult.    For 24 hours DO NOT drive, operate machinery, appliances, drink alcohol, make important decisions or sign legal documents.    Start with a light or bland diet and advance to regular diet as tolerated.    Follow recommendations on procedure report provided by your doctor.    Call Dr Thompson for problems [phone]    Problems may include but not limited to: large amounts of bleeding, trouble breathing, repeated vomiting, severe unrelieved pain, fever or chills.

## 2018-01-15 NOTE — ANESTHESIA POSTPROCEDURE EVALUATION
"Patient: Miles Munoz    Procedure Summary     Date Anesthesia Start Anesthesia Stop Room / Location    01/15/18 1003 1053  [facility] /  [facility]       Procedure Diagnosis Surgeon Provider    ESOPHAGOGASTRODUODENOSCOPY WITH BIOPSY (N/A Esophagus); COLONOSCOPY WITH POLYPECTOMY TO CECUM HOT SNARE (N/A ) Occult blood positive stool; Gastroesophageal reflux disease, esophagitis presence not specified  (Occult blood positive stool [R19.5]; Gastroesophageal reflux disease, esophagitis presence not specified [K21.9]) MD Karen Anthony MD          Anesthesia Type: MAC  Last vitals  BP   112/76 (01/15/18 1047)   Temp   37 °C (98.6 °F) (01/15/18 0839)   Pulse   70 (01/15/18 1047)   Resp   20 (01/15/18 1047)     SpO2   94 % (01/15/18 1047)     Post Anesthesia Care and Evaluation    Patient location during evaluation: PACU  Patient participation: complete - patient participated  Level of consciousness: awake and alert  Pain management: adequate  Airway patency: patent  Anesthetic complications: No anesthetic complications    Cardiovascular status: acceptable  Respiratory status: acceptable  Hydration status: acceptable    Comments: /76 (BP Location: Left arm, Patient Position: Lying)  Pulse 70  Temp 37 °C (98.6 °F) (Oral)   Resp 20  Ht 170.2 cm (67\")  Wt 121 kg (266 lb 1 oz)  SpO2 94%  BMI 41.67 kg/m2      "

## 2018-01-15 NOTE — PLAN OF CARE
Problem: Patient Care Overview (Adult)  Goal: Plan of Care Review  Outcome: Ongoing (interventions implemented as appropriate)   01/15/18 0848   Coping/Psychosocial Response Interventions   Plan Of Care Reviewed With patient   Patient Care Overview   Progress improving   Outcome Evaluation   Outcome Summary/Follow up Plan vss, ready for or     Goal: Adult Individualization and Mutuality  Outcome: Ongoing (interventions implemented as appropriate)   01/15/18 0848   Individualization   Patient Specific Preferences goes by kaushik     Goal: Discharge Needs Assessment  Outcome: Ongoing (interventions implemented as appropriate)   01/15/18 0848   Discharge Needs Assessment   Concerns To Be Addressed denies needs/concerns at this time   Discharge Disposition home or self-care   Living Environment   Transportation Available car;family or friend will provide       Problem: GI Endoscopy (Adult)  Intervention: Monitor/Manage Procedure Recovery   01/15/18 0848   Respiratory Interventions   Airway/Ventilation Management airway patency maintained   Coping/Psychosocial Interventions   Environmental Support calm environment promoted   Activity   Activity Type activity adjusted per tolerance   Cardiac Interventions   Warming Thermoregulation Maintenance warm blankets applied     Intervention: Prevent Tika-procedural Injury   01/15/18 0848   Positioning   Positioning side lying, left   Head Of Bed (HOB) Position HOB elevated       Goal: Signs and Symptoms of Listed Potential Problems Will be Absent or Manageable (GI Endoscopy)  Outcome: Ongoing (interventions implemented as appropriate)   01/15/18 0848   GI Endoscopy   Problems Assessed (GI Endoscopy) all   Problems Present (GI Endoscopy) none

## 2018-01-16 LAB
CYTO UR: NORMAL
LAB AP CASE REPORT: NORMAL
LAB AP INTRADEPARTMENTAL CONSULT: NORMAL
Lab: NORMAL
PATH REPORT.FINAL DX SPEC: NORMAL
PATH REPORT.GROSS SPEC: NORMAL

## 2018-01-17 PROBLEM — D12.6 TUBULOVILLOUS ADENOMA OF COLON: Status: ACTIVE | Noted: 2018-01-17

## 2018-02-09 ENCOUNTER — TELEPHONE (OUTPATIENT)
Dept: FAMILY MEDICINE CLINIC | Facility: CLINIC | Age: 53
End: 2018-02-09

## 2018-02-09 RX ORDER — METHYLPREDNISOLONE 4 MG/1
TABLET ORAL
Qty: 32 TABLET | Refills: 0 | Status: SHIPPED | OUTPATIENT
Start: 2018-02-09 | End: 2018-04-09

## 2018-02-13 ENCOUNTER — PREP FOR SURGERY (OUTPATIENT)
Dept: OTHER | Facility: HOSPITAL | Age: 53
End: 2018-02-13

## 2018-02-13 ENCOUNTER — TELEPHONE (OUTPATIENT)
Dept: GASTROENTEROLOGY | Facility: CLINIC | Age: 53
End: 2018-02-13

## 2018-02-13 DIAGNOSIS — K25.9 MULTIPLE GASTRIC ULCERS: Primary | ICD-10-CM

## 2018-02-13 NOTE — TELEPHONE ENCOUNTER
Patient called, advised Dr. Thompson does want to repeat his EGD in 8 weeks. Advised a  will be calling to schedule this. He verb understanding and is agreeable to the plan. Order placed for repeat EGd.

## 2018-02-13 NOTE — TELEPHONE ENCOUNTER
Called pt and advised that per Dr Thompson: the biopsies from his upper scope came back OK and the polyps that were removed during the colonoscopy were not cancerous but were considered potentially precancerous. Advised that MD recommends to repeat the c/s in 3 years. Pt verb understanding and requests for his next scope, not to use Moviprep. Pt states MD told his wife after his scopes that the upper scope needed to be repeated in 90 days to check on ulcers. Advised will check with MD and if he verifies will have scheduling call back to arrange scope. Advised that is is common practice to repeat scope in 3 months to check with healing of ulcers, but need to verify with MD first as I do not have an order form him. Pt verb understanding.     Health Maintenance updated to reflect c/s due 1/2021.

## 2018-02-13 NOTE — TELEPHONE ENCOUNTER
----- Message from Leopoldo Thompson MD sent at 1/28/2018  8:17 AM EST -----  Path from EGD ok  Colon polyps were pre-cancerous    Recall colonoscopy in 3 years

## 2018-02-15 PROBLEM — K25.9 MULTIPLE GASTRIC ULCERS: Status: ACTIVE | Noted: 2018-02-15

## 2018-02-26 ENCOUNTER — OFFICE VISIT (OUTPATIENT)
Dept: SLEEP MEDICINE | Facility: HOSPITAL | Age: 53
End: 2018-02-26
Attending: PSYCHIATRY & NEUROLOGY

## 2018-02-26 VITALS
HEART RATE: 68 BPM | DIASTOLIC BLOOD PRESSURE: 78 MMHG | BODY MASS INDEX: 43.63 KG/M2 | WEIGHT: 278 LBS | HEIGHT: 67 IN | SYSTOLIC BLOOD PRESSURE: 168 MMHG

## 2018-02-26 DIAGNOSIS — E66.01 OBESITY, MORBID, BMI 40.0-49.9 (HCC): ICD-10-CM

## 2018-02-26 DIAGNOSIS — R06.83 SNORES: ICD-10-CM

## 2018-02-26 DIAGNOSIS — G47.63 SLEEP-RELATED BRUXISM: ICD-10-CM

## 2018-02-26 DIAGNOSIS — R29.818 SUSPECTED SLEEP APNEA: Primary | ICD-10-CM

## 2018-02-26 DIAGNOSIS — R06.81 WITNESSED APNEIC SPELLS: ICD-10-CM

## 2018-02-26 PROCEDURE — G0463 HOSPITAL OUTPT CLINIC VISIT: HCPCS

## 2018-02-26 RX ORDER — POLYETHYLENE GLYCOL 3350, SODIUM SULFATE, SODIUM CHLORIDE, POTASSIUM CHLORIDE, ASCORBIC ACID, SODIUM ASCORBATE 7.5-2.691G
KIT ORAL
Refills: 0 | COMMUNITY
Start: 2017-12-31 | End: 2018-10-03

## 2018-02-26 RX ORDER — METHOCARBAMOL 500 MG/1
TABLET, FILM COATED ORAL
Refills: 1 | COMMUNITY
Start: 2017-12-31 | End: 2018-10-03

## 2018-03-13 ENCOUNTER — HOSPITAL ENCOUNTER (OUTPATIENT)
Dept: SLEEP MEDICINE | Facility: HOSPITAL | Age: 53
Discharge: HOME | End: 2018-03-13
Attending: INTERNAL MEDICINE | Admitting: INTERNAL MEDICINE

## 2018-03-13 DIAGNOSIS — E66.01 OBESITY, MORBID, BMI 40.0-49.9 (HCC): ICD-10-CM

## 2018-03-13 DIAGNOSIS — G47.63 SLEEP-RELATED BRUXISM: ICD-10-CM

## 2018-03-13 DIAGNOSIS — R06.83 SNORES: ICD-10-CM

## 2018-03-13 DIAGNOSIS — R29.818 SUSPECTED SLEEP APNEA: ICD-10-CM

## 2018-03-13 DIAGNOSIS — R06.81 WITNESSED APNEIC SPELLS: ICD-10-CM

## 2018-03-13 PROCEDURE — 95806 SLEEP STUDY UNATT&RESP EFFT: CPT

## 2018-04-02 ENCOUNTER — TELEPHONE (OUTPATIENT)
Dept: SLEEP MEDICINE | Facility: HOSPITAL | Age: 53
End: 2018-04-02

## 2018-04-02 NOTE — TELEPHONE ENCOUNTER
Spoke with patient about sleep study results, sending order to [facility] per patient, scheduled follow up appointment.

## 2018-04-09 ENCOUNTER — OFFICE VISIT (OUTPATIENT)
Dept: FAMILY MEDICINE CLINIC | Facility: CLINIC | Age: 53
End: 2018-04-09

## 2018-04-09 VITALS
BODY MASS INDEX: 44.26 KG/M2 | SYSTOLIC BLOOD PRESSURE: 138 MMHG | WEIGHT: 282 LBS | OXYGEN SATURATION: 94 % | HEIGHT: 67 IN | HEART RATE: 63 BPM | DIASTOLIC BLOOD PRESSURE: 78 MMHG | TEMPERATURE: 97.9 F

## 2018-04-09 DIAGNOSIS — K25.9 MULTIPLE GASTRIC ULCERS: ICD-10-CM

## 2018-04-09 DIAGNOSIS — D12.6 TUBULOVILLOUS ADENOMA OF COLON: Primary | ICD-10-CM

## 2018-04-09 DIAGNOSIS — G47.33 OSA (OBSTRUCTIVE SLEEP APNEA): ICD-10-CM

## 2018-04-09 PROCEDURE — 99213 OFFICE O/P EST LOW 20 MIN: CPT | Performed by: FAMILY MEDICINE

## 2018-04-09 RX ORDER — OMEPRAZOLE 40 MG/1
40 CAPSULE, DELAYED RELEASE ORAL DAILY
COMMUNITY
End: 2018-07-26 | Stop reason: ALTCHOICE

## 2018-04-09 RX ORDER — GABAPENTIN 300 MG/1
300 CAPSULE ORAL 2 TIMES DAILY
Refills: 2 | COMMUNITY
Start: 2018-03-02 | End: 2019-04-23 | Stop reason: SDUPTHER

## 2018-04-09 NOTE — PROGRESS NOTES
Subjective   Miles Munoz is a 52 y.o. male. Presents today for   Chief Complaint   Patient presents with   • Follow-up     pt here for 6 month follow up visit. he has low energy. he said he is getting his cpap machine tomorrow.       History of Present Illness  Patient here for f/u;  Still fatigued, but was diagnosed with MARIA A to receive cpap tomorrow.  Excited to start as tired all of the time.  Hopes to try lose weight.  Still smoking, trying to quit.    Since last seen had c-scope and EGD;  Had tubulovillous adenoma low grade dysplasia;  Had superficial gastric ulcers on EGD, no active bleeding.      No cp/soa;  Borderline bp;  Monitoring.    Review of Systems   Respiratory: Negative for shortness of breath.    Cardiovascular: Negative for chest pain.       The following portions of the patient's history were reviewed and updated as appropriate: allergies, current medications, past medical history, past social history and problem list.    Patient Active Problem List   Diagnosis   • Occult blood positive stool   • Gastroesophageal reflux disease   • Tubulovillous adenoma of colon   • Multiple gastric ulcers   • Suspected sleep apnea   • Snores   • Witnessed apneic spells   • Obesity, morbid, BMI 40.0-49.9   • Sleep-related bruxism       Allergies   Allergen Reactions   • Prednisone Other (See Comments)     Causes muscles to constrict        Current Outpatient Prescriptions on File Prior to Visit   Medication Sig Dispense Refill   • acetaminophen (TYLENOL) 325 MG tablet Take 650 mg by mouth Every 6 (Six) Hours As Needed for Mild Pain .     • cetirizine (zyrTEC) 10 MG tablet Take 10 mg by mouth Daily.     • methocarbamol (ROBAXIN) 500 MG tablet TAKE 1 TABLET BY MOUTH EVERY TWELVE HOURS AS NEEDED for muscle relaxation  1   • sildenafil (VIAGRA) 100 MG tablet Take 100 mg by mouth Daily As Needed for erectile dysfunction.     • [DISCONTINUED] pantoprazole (PROTONIX) 40 MG EC tablet Take 1 tablet by mouth Daily. 60  "tablet 11   • MOVIPREP 100 g reconstituted solution powder USE PER PACKAGE DIRECTIONS  0   • tiZANidine (ZANAFLEX) 4 MG tablet Take 1 tablet by mouth At Night As Needed for Muscle Spasms. 30 tablet 5   • [DISCONTINUED] methylPREDNISolone (MEDROL) 4 MG tablet 6 tabs po x 2 days, then 4 tabs po x 2 days, then 3 tabs po x 2 days, then 2 tabs po x 2 days, then 1 tab po x 2 days 32 tablet 0     No current facility-administered medications on file prior to visit.        Objective   Vitals:    04/09/18 1813   BP: 138/78   BP Location: Right arm   Patient Position: Sitting   Cuff Size: Large Adult   Pulse: 63   Temp: 97.9 °F (36.6 °C)   TempSrc: Oral   SpO2: 94%   Weight: 128 kg (282 lb)   Height: 170.2 cm (67.01\")       Physical Exam   Constitutional: He appears well-developed and well-nourished.   HENT:   Head: Normocephalic and atraumatic.   Neck: Neck supple. No JVD present. No thyromegaly present.   Cardiovascular: Normal rate, regular rhythm and normal heart sounds.  Exam reveals no gallop and no friction rub.    No murmur heard.  Pulmonary/Chest: Effort normal and breath sounds normal. No respiratory distress. He has no wheezes. He has no rales.   Abdominal: Soft. Bowel sounds are normal. He exhibits no distension. There is no tenderness. There is no rebound and no guarding.   Musculoskeletal: He exhibits no edema.   Neurological: He is alert.   Skin: Skin is warm and dry.   Psychiatric: He has a normal mood and affect. His behavior is normal.   Nursing note and vitals reviewed.      Assessment/Plan   Miles was seen today for follow-up.    Diagnoses and all orders for this visit:    Tubulovillous adenoma of colon    Multiple gastric ulcers    MARIA A (obstructive sleep apnea)    -repeat c-scope as per Gi  -f/u gi for ulcers  -bp borderline, recommend quit smokign and work on weight loss  -maria a - start cpap;  Work on weight loss         -Follow up: 6 months       Current Outpatient Prescriptions:   •  acetaminophen " (TYLENOL) 325 MG tablet, Take 650 mg by mouth Every 6 (Six) Hours As Needed for Mild Pain ., Disp: , Rfl:   •  cetirizine (zyrTEC) 10 MG tablet, Take 10 mg by mouth Daily., Disp: , Rfl:   •  gabapentin (NEURONTIN) 300 MG capsule, Take 300 mg by mouth 3 (Three) Times a Day., Disp: , Rfl: 2  •  methocarbamol (ROBAXIN) 500 MG tablet, TAKE 1 TABLET BY MOUTH EVERY TWELVE HOURS AS NEEDED for muscle relaxation, Disp: , Rfl: 1  •  omeprazole (priLOSEC) 40 MG capsule, Take 40 mg by mouth Daily., Disp: , Rfl:   •  sildenafil (VIAGRA) 100 MG tablet, Take 100 mg by mouth Daily As Needed for erectile dysfunction., Disp: , Rfl:   •  MOVIPREP 100 g reconstituted solution powder, USE PER PACKAGE DIRECTIONS, Disp: , Rfl: 0  •  tiZANidine (ZANAFLEX) 4 MG tablet, Take 1 tablet by mouth At Night As Needed for Muscle Spasms., Disp: 30 tablet, Rfl: 5

## 2018-06-11 ENCOUNTER — OFFICE VISIT (OUTPATIENT)
Dept: SLEEP MEDICINE | Facility: HOSPITAL | Age: 53
End: 2018-06-11
Attending: INTERNAL MEDICINE

## 2018-06-11 VITALS
SYSTOLIC BLOOD PRESSURE: 150 MMHG | HEART RATE: 59 BPM | WEIGHT: 283 LBS | HEIGHT: 67 IN | BODY MASS INDEX: 44.42 KG/M2 | DIASTOLIC BLOOD PRESSURE: 81 MMHG

## 2018-06-11 DIAGNOSIS — Z99.89 OSA ON CPAP: Primary | ICD-10-CM

## 2018-06-11 DIAGNOSIS — G47.34 SLEEP RELATED HYPOXIA: ICD-10-CM

## 2018-06-11 DIAGNOSIS — G47.33 OSA ON CPAP: Primary | ICD-10-CM

## 2018-06-11 DIAGNOSIS — G47.30 HYPERSOMNIA WITH SLEEP APNEA: ICD-10-CM

## 2018-06-11 DIAGNOSIS — G47.10 HYPERSOMNIA WITH SLEEP APNEA: ICD-10-CM

## 2018-06-11 DIAGNOSIS — E66.01 OBESITY, MORBID, BMI 40.0-49.9 (HCC): ICD-10-CM

## 2018-06-11 PROCEDURE — G0463 HOSPITAL OUTPT CLINIC VISIT: HCPCS

## 2018-06-11 NOTE — PROGRESS NOTES
Sleep Disorder Follow Up    Patient Name: Miles Munoz  Age/Sex: 52 y.o. male  : 1965  MRN: [id number]    Date of Encounter Visit: 18  Referring Provider: Echo Marti MD  Place of Service: [facility]  Patient Care Team:  Aly Espinosa DO as PCP - General (Family Medicine)    PROBLEM LIST:  1. Severe obstructive sleep apnea on CPAP  2. Significant sleep related hypoxia  3. Sleep related bruxism  4. Obesity     History of Present Illness:  Miles Munoz is a 52 y.o. male who was last seen in the office on 18 for initial evaluation of suspected sleep apnea with complaints of snoring, witnessed apneas and daytime sleepiness. He had a home sleep study on 3/13/18 that showed an AHI of 30.7 that increased to 68.7 while in the left position and 75.9% of sleep time spent snoring. He was also noted to have significant sleep related hypoxia with bozena desaturation to 75% and 41.5 minute spent below 89%. Auto CPAP was ordered at 8-20 cmH20 with overnight oximetry on CPAP to be completed.      Since last visit, he has been using the CPAP and reports some mask issues and would like the try a more traditional full face mask.  Patient is on CPAP and uses it every night with no complaints from the mask or the pressure.  Patient uses a full face mask (Maddie view), which does not fit well. Patient reports excessive air leak.   Patient's equipment supplier is [employer] and last mask replacement was about 3 months ago.  Patient sleeps better and has a deeper sleep with the CPAP and feels more energy during the day time.  Current CPAP setting 8-20 cm H20.  [place] Sleepiness Scale (ESS) is 11, which is up from 10 initially.  Compliance download was reviewed and documented below.  Weight is up 5 pounds since last visit.  Other comorbidities include obesity.     Review of Systems:   A ten-system review was conducted and was negative except for nasal congestion and shortness of breath.    Please refer to the follow up sleep questionnaire page one for details.    Past Medical History:  Past medical, surgical, social, and family history, except as mentioned above, was unchanged from the last visit.     Past Medical History:   Diagnosis Date   • Allergic rhinitis    • GERD (gastroesophageal reflux disease)    • Renal stone    • Seasonal allergies        Past Surgical History:   Procedure Laterality Date   • COLONOSCOPY W/ POLYPECTOMY N/A 1/15/2018    Procedure: COLONOSCOPY WITH POLYPECTOMY TO CECUM HOT SNARE;  Surgeon: Leopoldo Thompson MD;  Location: [facility] ENDOSCOPY;  Service:    • CYSTOSCOPY      for renal stones   • ENDOSCOPY N/A 1/15/2018    Procedure: ESOPHAGOGASTRODUODENOSCOPY WITH BIOPSY;  Surgeon: Leopoldo Thompson MD;  Location: [facility] ENDOSCOPY;  Service:    • VASECTOMY     • WISDOM TOOTH EXTRACTION         Home Medications:     Current Outpatient Prescriptions:   •  acetaminophen (TYLENOL) 325 MG tablet, Take 650 mg by mouth Every 6 (Six) Hours As Needed for Mild Pain ., Disp: , Rfl:   •  cetirizine (zyrTEC) 10 MG tablet, Take 10 mg by mouth Daily., Disp: , Rfl:   •  gabapentin (NEURONTIN) 300 MG capsule, Take 300 mg by mouth 3 (Three) Times a Day., Disp: , Rfl: 2  •  methocarbamol (ROBAXIN) 500 MG tablet, TAKE 1 TABLET BY MOUTH EVERY TWELVE HOURS AS NEEDED for muscle relaxation, Disp: , Rfl: 1  •  MOVIPREP 100 g reconstituted solution powder, USE PER PACKAGE DIRECTIONS, Disp: , Rfl: 0  •  omeprazole (priLOSEC) 40 MG capsule, Take 40 mg by mouth Daily., Disp: , Rfl:   •  sildenafil (VIAGRA) 100 MG tablet, Take 100 mg by mouth Daily As Needed for erectile dysfunction., Disp: , Rfl:   •  tiZANidine (ZANAFLEX) 4 MG tablet, Take 1 tablet by mouth At Night As Needed for Muscle Spasms., Disp: 30 tablet, Rfl: 5    Allergies:  Allergies   Allergen Reactions   • Prednisone Other (See Comments)     Causes muscles to constrict        Past Social History:  Social History     Social History   •  "Marital status:      Social History Main Topics   • Smoking status: Current Every Day Smoker     Packs/day: 1.00     Types: Cigarettes     Start date: 10/5/1976   • Smokeless tobacco: Never Used   • Alcohol use Yes      Comment: occ   • Drug use: No   • Sexual activity: Defer     Other Topics Concern   • Not on file       Past Family History:  Family History   Problem Relation Age of Onset   • Heart failure Mother         43 yoa   • Obesity Mother         reports very severe morbid obesity   • Malig Hyperthermia Neg Hx          Objective:   Done and documented on sleep disorders center physical examination sheet, please refer to hand written note on the chart for details about the other pertinent negative findings.    Vital Signs:   Visit Vitals  /81   Pulse 59   Ht 170.2 cm (67\")   Wt 128 kg (283 lb)   BMI 44.32 kg/m²     Wt Readings from Last 3 Encounters:   06/11/18 128 kg (283 lb)   04/09/18 128 kg (282 lb)   02/26/18 126 kg (278 lb)          Physical Exam:   General: AAOx3. Normal mood and affect.   HEENT:  Conjunctiva normal.  PERRLA.  Moist mucous membranes.  Septum midline. Mallampati 4 airway. Macroglossia.  Redundant lateral pharyngeal tissue   Neck: Neck supple. Trachea midline.  Normal thyroid.   LUNGS: Non-labored breathing. CTAB.  No wheezing.  HEART: regular rate and rhythm. No murmur. Normal s1/s2.  EXTREMITIES: 1+ BLE edema.No cyanosis or clubbing. Normal gait.    Diagnostic Data:  HST 3/13/2018:   This is a home sleep study done on 3/13/18, total recording time was 423.5 minutes with total monitoring time of 415.0 minutes.  Respiratory summary: Severe MARIA A with AHI of 30.7 with positional component pain worst in the left position with AHI of 68.7.  Oxygenation: Significant hypoxemia was bozena desaturation to 75% with 41.5 minutes spent in the hypoxemic range.  Patient had clusters of significant hypoxemia that are likely to be REM sleep related however no way to confirm without the EEG " recording  Snoring: Patient had severe snoring at 75.9% of total recording time  Recommendations: auto CPAP 8-20 cmH20 and repeat overnight oximetry on CPAP.     Compliance download from 5/12/18 to 6/10/18 showed 100% compliant with average use of Excess hours and 56 minutes.  Residual AHI 1.0 on auto CPAP 8-20 cm H2O with 18 seconds of average ×7 with large air leak.  Majority of the time of (90%) pressure was adequate below 15 cm H2O with a mean pressure of 12.2 cm H2O.        Assessment and Plan:       ICD-10-CM ICD-9-CM   1. MARIA A on CPAP G47.33 327.23    Z99.89 V46.8   2. Hypersomnia with sleep apnea G47.10 780.53    G47.30    3. Obesity, morbid, BMI 40.0-49.9 E66.01 278.01   4. Sleep related hypoxia G47.34 327.24       Recommendations:     Patient is feeling better on the CPAP and plans to continue to use it. He does have clinical improvement and subjective improvement and CPAP is recommended to be continued.     Adjust the CPAP to 11-20 and continue to use it whenever asleep.     Will request overnight oximetry on CPAP that was already ordered.     Work on the weight loss.    Follow up yearly or sooner as needed.    No orders of the defined types were placed in this encounter.    Return in about 1 year (around 6/11/2019).    Echo Marti MD   [facility]   06/11/18  3:50 PM    Dictated utilizing Dragon dictation

## 2018-06-12 ENCOUNTER — TELEPHONE (OUTPATIENT)
Dept: SLEEP MEDICINE | Facility: HOSPITAL | Age: 53
End: 2018-06-12

## 2018-06-12 NOTE — TELEPHONE ENCOUNTER
Tech called DME company as pt had not yet had overnight oximetry study done. Per rep from [facility] was a overlook on there end and patient was never scheduled. Rep stated would reach out to patient today to try and schedule. MAB

## 2018-06-29 ENCOUNTER — OFFICE VISIT (OUTPATIENT)
Dept: FAMILY MEDICINE CLINIC | Facility: CLINIC | Age: 53
End: 2018-06-29

## 2018-06-29 VITALS
DIASTOLIC BLOOD PRESSURE: 82 MMHG | WEIGHT: 279 LBS | SYSTOLIC BLOOD PRESSURE: 132 MMHG | OXYGEN SATURATION: 96 % | BODY MASS INDEX: 43.79 KG/M2 | HEIGHT: 67 IN | HEART RATE: 63 BPM | TEMPERATURE: 98.1 F

## 2018-06-29 DIAGNOSIS — R53.83 FATIGUE, UNSPECIFIED TYPE: Primary | ICD-10-CM

## 2018-06-29 DIAGNOSIS — R09.89 GLOBUS SENSATION: ICD-10-CM

## 2018-06-29 DIAGNOSIS — R05.9 COUGH: ICD-10-CM

## 2018-06-29 PROCEDURE — 99213 OFFICE O/P EST LOW 20 MIN: CPT | Performed by: NURSE PRACTITIONER

## 2018-06-29 NOTE — PROGRESS NOTES
Subjective   Miles Munoz is a 52 y.o. male.  Cough, fatigue. Started about 2 weeks ago. He has no respiratory symptoms. He states this cough seems to be worse when he sits up or lying down at night. He does feel like there is something in his throat and sometimes feels like food gets stuck about midway down the esophagus and when if finally goes down, is kind of painful.  Wife concerned he might have Graves disease because of the symptoms. He did have EGD and colonoscopy back in Jan with some gastric ulcers and precancerous polyps. Currently on Omeprazole daily. This has helped with the reflux.     Cough   This is a new problem. The current episode started 1 to 4 weeks ago. The problem has been unchanged. The problem occurs constantly (except at night). The cough is non-productive. Associated symptoms include shortness of breath. Pertinent negatives include no wheezing. Exacerbated by: sitting. Treatments tried: dayquil. The treatment provided no relief.        The following portions of the patient's history were reviewed and updated as appropriate: allergies, current medications, past family history, past medical history, past social history, past surgical history and problem list.    Review of Systems   Constitutional: Positive for fatigue.   Respiratory: Positive for cough and shortness of breath. Negative for wheezing.    Cardiovascular: Negative.    Gastrointestinal: Positive for GERD.        Feels like something in throat  Food sometimes gets stuck         Objective   Physical Exam   Constitutional: Vital signs are normal. He appears well-developed and well-nourished. He is cooperative.   HENT:   Right Ear: Hearing and tympanic membrane normal.   Left Ear: Hearing and tympanic membrane normal.   Nose: Nose normal.   Mouth/Throat: Uvula is midline, oropharynx is clear and moist and mucous membranes are normal.   Neck: Carotid bruit is not present. No thyroid mass and no thyromegaly present.   Cardiovascular:  "Normal rate, regular rhythm and normal heart sounds.    Pulmonary/Chest: Effort normal and breath sounds normal.   Abdominal: Soft. Normal appearance and bowel sounds are normal. He exhibits no distension. There is no tenderness.   Neurological: He is alert.   Nursing note and vitals reviewed.    Vitals:    06/29/18 1102   BP: 132/82   Pulse: 63   Temp: 98.1 °F (36.7 °C)   TempSrc: Oral   SpO2: 96%   Weight: 127 kg (279 lb)   Height: 170.2 cm (67.01\")       Assessment/Plan   Diagnoses and all orders for this visit:    Fatigue, unspecified type  -     TSH  -     T4, Free  -     T3, Free    Cough  -     FL upper gi w air contrast and kub    Globus sensation  -     FL upper gi w air contrast and kub      GERD: Hold the omeprazole and given samples of Dexilant 60 mg. Take this instead. Let me know how this is working. Will send to pharmacy if helping.   Will check thyroid although not as likely given symptoms  Schedule UGI possible esophageal spasms, hiatal hernia??  Follow up after testing.       "

## 2018-06-30 LAB
T3FREE SERPL-MCNC: 3.5 PG/ML (ref 2–4.4)
T4 FREE SERPL-MCNC: 1.36 NG/DL (ref 0.93–1.7)
TSH SERPL DL<=0.005 MIU/L-ACNC: 1.14 MIU/ML (ref 0.27–4.2)

## 2018-07-03 ENCOUNTER — TELEPHONE (OUTPATIENT)
Dept: FAMILY MEDICINE CLINIC | Facility: CLINIC | Age: 53
End: 2018-07-03

## 2018-07-03 NOTE — TELEPHONE ENCOUNTER
----- Message from ALIA García sent at 7/2/2018  1:06 PM EDT -----  Call the patient on the lab results. The thyroid tests came back normal.

## 2018-07-09 ENCOUNTER — DOCUMENTATION (OUTPATIENT)
Dept: SLEEP MEDICINE | Facility: HOSPITAL | Age: 53
End: 2018-07-09

## 2018-07-09 NOTE — PROGRESS NOTES
Overnight oximetry on room air done on 6/14/18  Patient had oxygenation in the lower 90s but still within normal range most of the night with only 0.6 minutes with sats below or equal to 88%  There is rare scattered desaturation with overall acceptable profile with no significant hypoxemia or clustering of desaturations

## 2018-07-10 ENCOUNTER — TELEPHONE (OUTPATIENT)
Dept: SLEEP MEDICINE | Facility: HOSPITAL | Age: 53
End: 2018-07-10

## 2018-07-11 ENCOUNTER — TELEPHONE (OUTPATIENT)
Dept: FAMILY MEDICINE CLINIC | Facility: CLINIC | Age: 53
End: 2018-07-11

## 2018-07-12 RX ORDER — DEXLANSOPRAZOLE 60 MG/1
60 CAPSULE, DELAYED RELEASE ORAL DAILY
Qty: 30 CAPSULE | Refills: 2 | Status: SHIPPED | OUTPATIENT
Start: 2018-07-12 | End: 2018-07-18 | Stop reason: SDUPTHER

## 2018-07-18 ENCOUNTER — TELEPHONE (OUTPATIENT)
Dept: FAMILY MEDICINE CLINIC | Facility: CLINIC | Age: 53
End: 2018-07-18

## 2018-07-18 RX ORDER — DEXLANSOPRAZOLE 60 MG/1
60 CAPSULE, DELAYED RELEASE ORAL DAILY
Qty: 90 CAPSULE | Refills: 1 | Status: SHIPPED | OUTPATIENT
Start: 2018-07-18 | End: 2018-07-26 | Stop reason: ALTCHOICE

## 2018-07-19 ENCOUNTER — HOSPITAL ENCOUNTER (OUTPATIENT)
Dept: GENERAL RADIOLOGY | Facility: HOSPITAL | Age: 53
Discharge: HOME | End: 2018-07-19
Admitting: NURSE PRACTITIONER

## 2018-07-19 ENCOUNTER — TELEPHONE (OUTPATIENT)
Dept: FAMILY MEDICINE CLINIC | Facility: CLINIC | Age: 53
End: 2018-07-19

## 2018-07-19 PROCEDURE — 74247: CPT

## 2018-07-19 NOTE — TELEPHONE ENCOUNTER
----- Message from ALIA García sent at 7/19/2018  3:13 PM EDT -----  Call the patient on the results of the UGI. Does not appear to have reflux and no aspiration. There is a small hiatal hernia. No mass seen. Continue the Dexilant if that is still helping. Thanks.

## 2018-07-26 RX ORDER — LANSOPRAZOLE 30 MG/1
30 CAPSULE, DELAYED RELEASE ORAL 2 TIMES DAILY
Qty: 60 CAPSULE | Refills: 2 | Status: SHIPPED | OUTPATIENT
Start: 2018-07-26 | End: 2018-08-22 | Stop reason: ALTCHOICE

## 2018-08-22 ENCOUNTER — OFFICE VISIT (OUTPATIENT)
Dept: FAMILY MEDICINE CLINIC | Facility: CLINIC | Age: 53
End: 2018-08-22

## 2018-08-22 VITALS
TEMPERATURE: 98 F | HEIGHT: 67 IN | OXYGEN SATURATION: 98 % | WEIGHT: 276 LBS | HEART RATE: 61 BPM | DIASTOLIC BLOOD PRESSURE: 82 MMHG | SYSTOLIC BLOOD PRESSURE: 138 MMHG | BODY MASS INDEX: 43.32 KG/M2

## 2018-08-22 DIAGNOSIS — K21.00 GASTROESOPHAGEAL REFLUX DISEASE WITH ESOPHAGITIS: ICD-10-CM

## 2018-08-22 DIAGNOSIS — J01.90 ACUTE SINUSITIS, RECURRENCE NOT SPECIFIED, UNSPECIFIED LOCATION: ICD-10-CM

## 2018-08-22 DIAGNOSIS — J40 BRONCHITIS: Primary | ICD-10-CM

## 2018-08-22 PROCEDURE — 99213 OFFICE O/P EST LOW 20 MIN: CPT | Performed by: NURSE PRACTITIONER

## 2018-08-22 RX ORDER — AMOXICILLIN 875 MG/1
875 TABLET, COATED ORAL 2 TIMES DAILY
Qty: 20 TABLET | Refills: 0 | Status: SHIPPED | OUTPATIENT
Start: 2018-08-22 | End: 2018-10-03

## 2018-08-22 RX ORDER — OMEPRAZOLE 40 MG/1
40 CAPSULE, DELAYED RELEASE ORAL DAILY
Qty: 90 CAPSULE | Refills: 1 | Status: SHIPPED | OUTPATIENT
Start: 2018-08-22

## 2018-08-22 RX ORDER — GUAIFENESIN 600 MG/1
1200 TABLET, EXTENDED RELEASE ORAL 2 TIMES DAILY
Qty: 60 TABLET | Refills: 1 | Status: SHIPPED | OUTPATIENT
Start: 2018-08-22 | End: 2018-10-03

## 2018-08-22 NOTE — PROGRESS NOTES
Subjective   Miles Munoz is a 52 y.o. male. He has sore throat, clearing throat a lot. Has hx of GERD and small HH. Has chest congestion, cough - non productive.  It started about 2-3 weeks ago. Feels like he has congestion but it won't come out. Has some nasal congestion as well. Thinks some of this may be allergies cause is worse when he comes home and they have a cat.   Taking the Prilosec 20 mg because the insurance would not cover the Dexilant. He has been buying OTC because the insurance also would not cover the prevacid. Still having some symptoms of GERD but this medication does help. Not sure if the sore throat is from the GERD, the sinuses or the chest congestion. He is a smoker but is trying to cut down.      Cough   This is a new problem. The current episode started 1 to 4 weeks ago. The problem has been gradually worsening. The problem occurs constantly. The cough is non-productive. Associated symptoms include heartburn, a sore throat, shortness of breath (little bit) and wheezing. Pertinent negatives include no chills or fever. Risk factors for lung disease include smoking/tobacco exposure. Treatments tried: Zyrtec. The treatment provided mild relief. His past medical history is significant for environmental allergies.   Heartburn   He complains of coughing, globus sensation, heartburn, a sore throat and wheezing. This is a chronic problem. The current episode started more than 1 month ago. The problem occurs constantly. The problem has been waxing and waning. The heartburn is located in the substernum. The heartburn is of mild intensity. The heartburn does not wake him from sleep. The heartburn does not limit his activity. The heartburn doesn't change with position. The symptoms are aggravated by certain foods and lying down. Risk factors include smoking/tobacco exposure. He has tried a PPI for the symptoms. The treatment provided moderate relief. Past procedures include a UGI.        The following  "portions of the patient's history were reviewed and updated as appropriate: allergies, current medications, past family history, past medical history, past social history, past surgical history and problem list.    Review of Systems   Constitutional: Negative for chills and fever.   HENT: Positive for congestion and sore throat.    Respiratory: Positive for cough, shortness of breath (little bit) and wheezing.    Cardiovascular: Negative.    Gastrointestinal: Positive for GERD.   Allergic/Immunologic: Positive for environmental allergies.       Objective   Physical Exam   Constitutional: Vital signs are normal. He appears well-developed and well-nourished. He is cooperative.   HENT:   Right Ear: Hearing and tympanic membrane normal.   Left Ear: Hearing and tympanic membrane normal.   Nose: Mucosal edema, rhinorrhea and congestion present.   Mouth/Throat: Uvula is midline and mucous membranes are normal. Posterior oropharyngeal erythema present.   Cardiovascular: Normal rate, regular rhythm and normal heart sounds.    Pulmonary/Chest: Effort normal. He has wheezes (scattered).   Neurological: He is alert.   Nursing note and vitals reviewed.    Vitals:    08/22/18 1300   BP: 138/82   Pulse: 61   Temp: 98 °F (36.7 °C)   TempSrc: Oral   SpO2: 98%   Weight: 125 kg (276 lb)   Height: 170.2 cm (67.01\")       Assessment/Plan   Miles was seen today for uri and heartburn.    Diagnoses and all orders for this visit:    Bronchitis  -     guaiFENesin (MUCINEX) 600 MG 12 hr tablet; Take 2 tablets by mouth 2 (Two) Times a Day.  -     amoxicillin (AMOXIL) 875 MG tablet; Take 1 tablet by mouth 2 (Two) Times a Day.    Gastroesophageal reflux disease with esophagitis  -     omeprazole (priLOSEC) 40 MG capsule; Take 1 capsule by mouth Daily.    Acute sinusitis, recurrence not specified, unspecified location  -     amoxicillin (AMOXIL) 875 MG tablet; Take 1 tablet by mouth 2 (Two) Times a Day.    Bronchitis/Sinusitis: Start " amoxicillin/mucinex. Lots of fluids. Continue cetirizine. If continues with sore throat, may need to see ENT for scope as he is smoker.    GERD: Increase prilosec to 40 mg daily. New script sent for 90 day supply.

## 2018-10-03 ENCOUNTER — OFFICE VISIT (OUTPATIENT)
Dept: FAMILY MEDICINE CLINIC | Facility: CLINIC | Age: 53
End: 2018-10-03

## 2018-10-03 VITALS
SYSTOLIC BLOOD PRESSURE: 124 MMHG | HEART RATE: 76 BPM | BODY MASS INDEX: 43.32 KG/M2 | OXYGEN SATURATION: 99 % | WEIGHT: 276 LBS | DIASTOLIC BLOOD PRESSURE: 78 MMHG | TEMPERATURE: 98.2 F | HEIGHT: 67 IN

## 2018-10-03 DIAGNOSIS — J01.10 ACUTE NON-RECURRENT FRONTAL SINUSITIS: Primary | ICD-10-CM

## 2018-10-03 DIAGNOSIS — Z71.6 ENCOUNTER FOR SMOKING CESSATION COUNSELING: ICD-10-CM

## 2018-10-03 DIAGNOSIS — Z72.0 TOBACCO ABUSE: ICD-10-CM

## 2018-10-03 PROCEDURE — 99213 OFFICE O/P EST LOW 20 MIN: CPT | Performed by: NURSE PRACTITIONER

## 2018-10-03 PROCEDURE — 99406 BEHAV CHNG SMOKING 3-10 MIN: CPT | Performed by: NURSE PRACTITIONER

## 2018-10-03 RX ORDER — AZITHROMYCIN 250 MG/1
TABLET, FILM COATED ORAL
Qty: 6 TABLET | Refills: 0 | Status: SHIPPED | OUTPATIENT
Start: 2018-10-03 | End: 2018-10-15

## 2018-10-03 NOTE — PROGRESS NOTES
"Subjective   Miles Munoz is a 53 y.o. male who presents c/o cough, congestion, pressure in ears x several days.     History of Present Illness   3 day hx oif cough and congestion in the chest. Woke with ribs sore from coughing. Increased sinus drainage this am only. Taking dayquil and delsym for the symptoms.   Smoking has decreased greatly since ill. Thinks sinus infection last month did fully clear.   The following portions of the patient's history were reviewed and updated as appropriate: allergies, current medications, past family history, past medical history, past social history, past surgical history and problem list.    Review of Systems   Constitutional: Positive for chills. Negative for fever.   HENT: Positive for congestion, postnasal drip and sinus pressure. Negative for ear pain.    Respiratory: Positive for cough.         Does wear CPAP at night, waking with dry mouth   Cardiovascular: Negative.    Neurological: Positive for headache (dayquil helps).   Hematological: Negative.    Psychiatric/Behavioral: Negative.      /78   Pulse 76   Temp 98.2 °F (36.8 °C) (Oral)   Ht 170.2 cm (67\")   Wt 125 kg (276 lb)   SpO2 99%   BMI 43.23 kg/m²     Objective   Physical Exam   Constitutional: He appears well-developed and well-nourished.   HENT:   Right Ear: Tympanic membrane normal.   Left Ear: A middle ear effusion is present.   Nose: Mucosal edema and rhinorrhea present. Right sinus exhibits maxillary sinus tenderness and frontal sinus tenderness. Left sinus exhibits maxillary sinus tenderness.   Mouth/Throat: Mucous membranes are normal. Posterior oropharyngeal erythema present. No oropharyngeal exudate.   Neck: Normal range of motion. Neck supple. No tracheal deviation present. No thyromegaly present.   Cardiovascular: Normal rate, regular rhythm and normal heart sounds.    Pulmonary/Chest: Effort normal and breath sounds normal.   Lymphadenopathy:     He has no cervical adenopathy.   Psychiatric: " He has a normal mood and affect. His behavior is normal. Judgment and thought content normal.   Nursing note and vitals reviewed.    Assessment/Plan   Problems Addressed this Visit     None      Visit Diagnoses     Acute non-recurrent frontal sinusitis    -  Primary    Relevant Medications    azithromycin (ZITHROMAX) 250 MG tablet    Encounter for smoking cessation counseling        Tobacco abuse            Has tried chantix and the patch to quit smoking, had crazy dreams with the patch. Plans to just cut down on smoking. 3 minutes of 15 minutes spent on smoking cessation.  Sinusitis--will rx azithromycin, as recent antibiotics, follow up if not settling.

## 2018-10-15 ENCOUNTER — OFFICE VISIT (OUTPATIENT)
Dept: FAMILY MEDICINE CLINIC | Facility: CLINIC | Age: 53
End: 2018-10-15

## 2018-10-15 VITALS
TEMPERATURE: 98.2 F | HEART RATE: 72 BPM | DIASTOLIC BLOOD PRESSURE: 60 MMHG | SYSTOLIC BLOOD PRESSURE: 120 MMHG | BODY MASS INDEX: 42.6 KG/M2 | WEIGHT: 272 LBS | OXYGEN SATURATION: 96 %

## 2018-10-15 DIAGNOSIS — Z00.00 HEALTH CARE MAINTENANCE: ICD-10-CM

## 2018-10-15 DIAGNOSIS — G47.33 OSA ON CPAP: ICD-10-CM

## 2018-10-15 DIAGNOSIS — Z23 IMMUNIZATION DUE: ICD-10-CM

## 2018-10-15 DIAGNOSIS — F17.200 SMOKING: ICD-10-CM

## 2018-10-15 DIAGNOSIS — Z00.00 WELLNESS EXAMINATION: Primary | ICD-10-CM

## 2018-10-15 DIAGNOSIS — Z99.89 OSA ON CPAP: ICD-10-CM

## 2018-10-15 PROBLEM — R29.818 SUSPECTED SLEEP APNEA: Status: RESOLVED | Noted: 2018-02-26 | Resolved: 2018-10-15

## 2018-10-15 PROCEDURE — 90472 IMMUNIZATION ADMIN EACH ADD: CPT | Performed by: FAMILY MEDICINE

## 2018-10-15 PROCEDURE — 90674 CCIIV4 VAC NO PRSV 0.5 ML IM: CPT | Performed by: FAMILY MEDICINE

## 2018-10-15 PROCEDURE — 99396 PREV VISIT EST AGE 40-64: CPT | Performed by: FAMILY MEDICINE

## 2018-10-15 PROCEDURE — 90471 IMMUNIZATION ADMIN: CPT | Performed by: FAMILY MEDICINE

## 2018-10-15 PROCEDURE — 90732 PPSV23 VACC 2 YRS+ SUBQ/IM: CPT | Performed by: FAMILY MEDICINE

## 2018-10-15 RX ORDER — DOXYCYCLINE HYCLATE 100 MG/1
100 CAPSULE ORAL 2 TIMES DAILY
Qty: 20 CAPSULE | Refills: 0 | Status: SHIPPED | OUTPATIENT
Start: 2018-10-15 | End: 2019-04-23

## 2018-10-15 RX ORDER — BUPROPION HYDROCHLORIDE 150 MG/1
150 TABLET, EXTENDED RELEASE ORAL EVERY 12 HOURS SCHEDULED
Qty: 60 TABLET | Refills: 5 | Status: SHIPPED | OUTPATIENT
Start: 2018-10-15 | End: 2019-03-11 | Stop reason: SDUPTHER

## 2018-10-15 NOTE — PROGRESS NOTES
Subjective   Miles Munoz is a 53 y.o. male. Presents today for   Chief Complaint   Patient presents with   • Annual Exam     med check and labs sinus drainage congestion with cough x 2 weeks     Patient annual visit.  Has MARIA A reports struggling with cpap but wearing it.  Has had borerline bp.   Was seen for sinus infection 2 weeks ago, but not cleared.  No dyspnea/wheezing or chest pain.  Trying to work on diet, down to 2 mountain dew a day.  Exercising occly, works 12+ hours a day.   Still smoking 1 ppd.  Had precancerous polyp on c-scope.  Follows with urology.    History of Present Illness    Review of Systems   HENT: Positive for congestion, sinus pain and sinus pressure.    Respiratory: Negative for shortness of breath.    Cardiovascular: Negative for chest pain.   Gastrointestinal: Negative for abdominal pain.       Patient Active Problem List   Diagnosis   • Occult blood positive stool   • Gastroesophageal reflux disease   • Tubulovillous adenoma of colon   • Multiple gastric ulcers   • Snores   • Witnessed apneic spells   • Obesity, morbid, BMI 40.0-49.9 (CMS/HCC)   • Sleep-related bruxism   • MARIA A on CPAP   • Hypersomnia with sleep apnea   • Sleep related hypoxia       Social History     Social History   • Marital status:      Social History Main Topics   • Smoking status: Current Every Day Smoker     Packs/day: 1.00     Types: Cigarettes     Start date: 10/5/1976   • Smokeless tobacco: Never Used   • Alcohol use Yes      Comment: occ   • Drug use: No   • Sexual activity: Defer     Other Topics Concern   • Not on file       Allergies   Allergen Reactions   • Prednisone Other (See Comments)     Causes muscles to constrict        Current Outpatient Prescriptions on File Prior to Visit   Medication Sig Dispense Refill   • acetaminophen (TYLENOL) 325 MG tablet Take 650 mg by mouth Every 6 (Six) Hours As Needed for Mild Pain .     • cetirizine (zyrTEC) 10 MG tablet Take 10 mg by mouth Daily.     •  gabapentin (NEURONTIN) 300 MG capsule Take 300 mg by mouth 3 (Three) Times a Day.  2   • omeprazole (priLOSEC) 40 MG capsule Take 1 capsule by mouth Daily. 90 capsule 1   • sildenafil (VIAGRA) 100 MG tablet Take 100 mg by mouth Daily As Needed for erectile dysfunction.     • tiZANidine (ZANAFLEX) 4 MG tablet Take 1 tablet by mouth At Night As Needed for Muscle Spasms. 30 tablet 5   • [DISCONTINUED] azithromycin (ZITHROMAX) 250 MG tablet Take 2 tablets the first day, then 1 tablet daily for 4 days. 6 tablet 0     No current facility-administered medications on file prior to visit.        Objective   Vitals:    10/15/18 1622 10/15/18 1659   BP: 150/72 120/60   Pulse: 72    Temp: 98.2 °F (36.8 °C)    SpO2: 96%    Weight: 123 kg (272 lb)        Physical Exam   Constitutional: He appears well-developed and well-nourished.   HENT:   Head: Normocephalic and atraumatic.   Nose: Mucosal edema present.   Mouth/Throat: Uvula is midline, oropharynx is clear and moist and mucous membranes are normal.   Neck: Neck supple. No JVD present. No thyromegaly present.   Cardiovascular: Normal rate, regular rhythm and normal heart sounds.  Exam reveals no gallop and no friction rub.    No murmur heard.  Pulmonary/Chest: Effort normal and breath sounds normal. No respiratory distress. He has no wheezes. He has no rales.   Abdominal: Soft. Bowel sounds are normal. He exhibits no distension. There is no tenderness. There is no rebound and no guarding.   Musculoskeletal: He exhibits no edema.   Neurological: He is alert.   Skin: Skin is warm and dry.   Psychiatric: He has a normal mood and affect. His behavior is normal.   Nursing note and vitals reviewed.      Assessment/Plan   Miles was seen today for annual exam.    Diagnoses and all orders for this visit:    Wellness examination    MARIA A on CPAP    Smoking  -     buPROPion SR (WELLBUTRIN SR) 150 MG 12 hr tablet; Take 1 tablet by mouth Every 12 (Twelve) Hours.    Health care maintenance  -  "    Comprehensive Metabolic Panel  -     Lipid Panel    Immunization due  -     Pneumococcal Polysaccharide Vaccine 23-Valent (PPSV23) Greater Than or Equal To 3yo Subcutaneous / IM  -     Flucelvax Quad=>4Years (0814-4537)    Other orders  -     doxycycline (VIBRAMYCIN) 100 MG capsule; Take 1 capsule by mouth 2 (Two) Times a Day.    -MARIA A - compliant with cpap and medically benefit to continue.  -counseled on diet and exercise  -highly recommend quit smoking, ready to quit and will try wellbutrin.  Tried chantix, patches and gum in past.  -rarely uses gabapentin per patient, was given by \"back doctor\", but no longer following.  Back doing ok now.         -Follow up: 12 months and prn  "

## 2018-10-16 LAB
ALBUMIN SERPL-MCNC: 4 G/DL (ref 3.5–5.5)
ALBUMIN/GLOB SERPL: 1.3 {RATIO} (ref 1.2–2.2)
ALP SERPL-CCNC: 68 IU/L (ref 39–117)
ALT SERPL-CCNC: 12 IU/L (ref 0–44)
AST SERPL-CCNC: 11 IU/L (ref 0–40)
BILIRUB SERPL-MCNC: 0.4 MG/DL (ref 0–1.2)
BUN SERPL-MCNC: 14 MG/DL (ref 6–24)
BUN/CREAT SERPL: 15 (ref 9–20)
CALCIUM SERPL-MCNC: 9.2 MG/DL (ref 8.7–10.2)
CHLORIDE SERPL-SCNC: 105 MMOL/L (ref 96–106)
CHOLEST SERPL-MCNC: 214 MG/DL (ref 100–199)
CO2 SERPL-SCNC: 24 MMOL/L (ref 20–29)
CREAT SERPL-MCNC: 0.91 MG/DL (ref 0.76–1.27)
GLOBULIN SER CALC-MCNC: 3.1 G/DL (ref 1.5–4.5)
GLUCOSE SERPL-MCNC: 118 MG/DL (ref 65–99)
HDLC SERPL-MCNC: 34 MG/DL
LDLC SERPL CALC-MCNC: 139 MG/DL (ref 0–99)
Lab: NORMAL
POTASSIUM SERPL-SCNC: 3.7 MMOL/L (ref 3.5–5.2)
PROT SERPL-MCNC: 7.1 G/DL (ref 6–8.5)
SODIUM SERPL-SCNC: 143 MMOL/L (ref 134–144)
TRIGL SERPL-MCNC: 203 MG/DL (ref 0–149)
VLDLC SERPL CALC-MCNC: 41 MG/DL (ref 5–40)

## 2018-10-17 NOTE — PROGRESS NOTES
Call results to patient.  Cholesterol is high, recommend start atorvastatin 20mg 1 po daily since smoker.  I would also take aspirin 81mg 1 daily (OTC)

## 2018-11-05 RX ORDER — ATORVASTATIN CALCIUM 20 MG/1
20 TABLET, FILM COATED ORAL DAILY
Qty: 30 TABLET | Refills: 5 | Status: SHIPPED | OUTPATIENT
Start: 2018-11-05 | End: 2019-02-12 | Stop reason: SDUPTHER

## 2018-11-13 ENCOUNTER — TELEPHONE (OUTPATIENT)
Dept: FAMILY MEDICINE CLINIC | Facility: CLINIC | Age: 53
End: 2018-11-13

## 2018-11-13 DIAGNOSIS — G56.03 BILATERAL CARPAL TUNNEL SYNDROME: Primary | ICD-10-CM

## 2019-01-18 ENCOUNTER — APPOINTMENT (OUTPATIENT)
Dept: PREADMISSION TESTING | Facility: HOSPITAL | Age: 54
End: 2019-01-18

## 2019-01-18 VITALS
WEIGHT: 280 LBS | RESPIRATION RATE: 20 BRPM | OXYGEN SATURATION: 99 % | SYSTOLIC BLOOD PRESSURE: 143 MMHG | HEART RATE: 62 BPM | BODY MASS INDEX: 43.95 KG/M2 | TEMPERATURE: 98 F | HEIGHT: 67 IN | DIASTOLIC BLOOD PRESSURE: 84 MMHG

## 2019-01-18 LAB
ANION GAP SERPL CALCULATED.3IONS-SCNC: 11 MMOL/L
BUN BLD-MCNC: 10 MG/DL (ref 6–20)
BUN/CREAT SERPL: 11.5 (ref 7–25)
CALCIUM SPEC-SCNC: 8.7 MG/DL (ref 8.6–10.5)
CHLORIDE SERPL-SCNC: 105 MMOL/L (ref 98–107)
CO2 SERPL-SCNC: 26 MMOL/L (ref 22–29)
CREAT BLD-MCNC: 0.87 MG/DL (ref 0.76–1.27)
DEPRECATED RDW RBC AUTO: 45.9 FL (ref 37–54)
ERYTHROCYTE [DISTWIDTH] IN BLOOD BY AUTOMATED COUNT: 15.7 % (ref 11.5–14.5)
GFR SERPL CREATININE-BSD FRML MDRD: 92 ML/MIN/1.73
GLUCOSE BLD-MCNC: 106 MG/DL (ref 65–99)
HCT VFR BLD AUTO: 38.8 % (ref 40.4–52.2)
HGB BLD-MCNC: 11.4 G/DL (ref 13.7–17.6)
MCH RBC QN AUTO: 23.5 PG (ref 27–32.7)
MCHC RBC AUTO-ENTMCNC: 29.4 G/DL (ref 32.6–36.4)
MCV RBC AUTO: 80 FL (ref 79.8–96.2)
PLATELET # BLD AUTO: 254 10*3/MM3 (ref 140–500)
PMV BLD AUTO: 11 FL (ref 6–12)
POTASSIUM BLD-SCNC: 3.5 MMOL/L (ref 3.5–5.2)
RBC # BLD AUTO: 4.85 10*6/MM3 (ref 4.6–6)
SODIUM BLD-SCNC: 142 MMOL/L (ref 136–145)
WBC NRBC COR # BLD: 6.91 10*3/MM3 (ref 4.5–10.7)

## 2019-01-18 PROCEDURE — 93005 ELECTROCARDIOGRAM TRACING: CPT

## 2019-01-18 PROCEDURE — 36415 COLL VENOUS BLD VENIPUNCTURE: CPT

## 2019-01-18 PROCEDURE — 85027 COMPLETE CBC AUTOMATED: CPT | Performed by: PLASTIC SURGERY

## 2019-01-18 PROCEDURE — 93010 ELECTROCARDIOGRAM REPORT: CPT | Performed by: INTERNAL MEDICINE

## 2019-01-18 PROCEDURE — 80048 BASIC METABOLIC PNL TOTAL CA: CPT | Performed by: PLASTIC SURGERY

## 2019-01-18 NOTE — DISCHARGE INSTRUCTIONS
Take the following medications the morning of surgery with a small sip of water:  omeprazole        General Instructions:  • Do not eat solid food after midnight the night before surgery.  • You may drink clear liquids day of surgery but must stop at least one hour before your hospital arrival time.  • It is beneficial for you to have a clear drink that contains carbohydrates the day of surgery.  We suggest a 12 to 20 ounce bottle of Gatorade or Powerade for non-diabetic patients or a 12 to 20 ounce bottle of G2 or Powerade Zero for diabetic patients. (Pediatric patients, are not advised to drink a 12 to 20 ounce carbohydrate drink)    Clear liquids are liquids you can see through.  Nothing red in color.     Plain water                               Sports drinks  Sodas                                   Gelatin (Jell-O)  Fruit juices without pulp such as white grape juice and apple juice  Popsicles that contain no fruit or yogurt  Tea or coffee (no cream or milk added)  Gatorade / Powerade  G2 / Powerade Zero    • Infants may have breast milk up to four hours before surgery.  • Infants drinking formula may drink formula up to six hours before surgery.   • Patients who avoid smoking, chewing tobacco and alcohol for 4 weeks prior to surgery have a reduced risk of post-operative complications.  Quit smoking as many days before surgery as you can.  • Do not smoke, use chewing tobacco or drink alcohol the day of surgery.   • If applicable bring your C-PAP/ BI-PAP machine.  • Bring any papers given to you in the doctor’s office.  • Wear clean comfortable clothes and socks.  • Do not wear contact lenses or make-up.  Bring a case for your glasses.   • Bring crutches or walker if applicable.  • Remove all piercings.  Leave jewelry and any other valuables at home.  • Hair extensions with metal clips must be removed prior to surgery.  • The Pre-Admission Testing nurse will instruct you to bring medications if unable to obtain  an accurate list in Pre-Admission Testing.        If you were given a blood bank ID arm band remember to bring it with you the day of surgery.    Preventing a Surgical Site Infection:  • For 2 to 3 days before surgery, avoid shaving with a razor because the razor can irritate skin and make it easier to develop an infection.    • Any areas of open skin can increase the risk of a post-operative wound infection by allowing bacteria to enter and travel throughout the body.  Notify your surgeon if you have any skin wounds / rashes even if it is not near the expected surgical site.  The area will need assessed to determine if surgery should be delayed until it is healed.  • The night prior to surgery sleep in a clean bed with clean clothing.  Do not allow pets to sleep with you.  • Shower on the morning of surgery using a fresh bar of anti-bacterial soap (such as Dial) and clean washcloth.  Dry with a clean towel and dress in clean clothing.  • Ask your surgeon if you will be receiving antibiotics prior to surgery.  • Make sure you, your family, and all healthcare providers clean their hands with soap and water or an alcohol based hand  before caring for you or your wound.    Day of surgery:  Upon arrival, a Pre-op nurse and Anesthesiologist will review your health history, obtain vital signs, and answer questions you may have.  The only belongings needed at this time will be your home medications and if applicable your C-PAP/BI-PAP machine.  If you are staying overnight your family can leave the rest of your belongings in the car and bring them to your room later.  A Pre-op nurse will start an IV and you may receive medication in preparation for surgery, including something to help you relax.  Your family will be able to see you in the Pre-op area.  While you are in surgery your family should notify the waiting room  if they leave the waiting room area and provide a contact phone number.    Please be  aware that surgery does come with discomfort.  We want to make every effort to control your discomfort so please discuss any uncontrolled symptoms with your nurse.   Your doctor will most likely have prescribed pain medications.      If you are going home after surgery you will receive individualized written care instructions before being discharged.  A responsible adult must drive you to and from the hospital on the day of your surgery and stay with you for 24 hours.    If you are staying overnight following surgery, you will be transported to your hospital room following the recovery period.  [facility] has all private rooms.    You have received a list of surgical assistants for your reference.  If you have any questions please call Pre-Admission Testing at [phone].  Deductibles and co-payments are collected on the day of service. Please be prepared to pay the required co-pay, deductible or deposit on the day of service as defined by your plan.

## 2019-01-22 NOTE — H&P
"      Patient Care Team:  Aly Espinosa DO as PCP - General (Family Medicine)    Chief complaint     Subjective \"My right hand goes numb\"    History of Present Illness      Positive EMG and Nerve Conduction Studies. Negative conservative management.    Review of Systems   Constitutional: Negative.    HENT: Negative.    Eyes: Negative.    Respiratory: Negative.    Cardiovascular: Negative.    Gastrointestinal: Negative.    Endocrine: Negative.    Genitourinary: Negative.    Musculoskeletal: Negative.    Skin: Negative.    Allergic/Immunologic: Negative.    Neurological: Positive for numbness.   Hematological: Negative.    Psychiatric/Behavioral: Negative.         Past Medical History:   Diagnosis Date   • Allergic rhinitis    • Arthritis    • Back pain    • GERD (gastroesophageal reflux disease)    • Hyperlipidemia    • Renal stone    • Seasonal allergies    • Sleep apnea      Past Surgical History:   Procedure Laterality Date   • COLONOSCOPY W/ POLYPECTOMY N/A 1/15/2018    Procedure: COLONOSCOPY WITH POLYPECTOMY TO CECUM HOT SNARE;  Surgeon: Leopoldo Thompson MD;  Location: [facility] ENDOSCOPY;  Service:    • CYSTOSCOPY      for renal stones   • ENDOSCOPY N/A 1/15/2018    Procedure: ESOPHAGOGASTRODUODENOSCOPY WITH BIOPSY;  Surgeon: Leopoldo Thompson MD;  Location: [facility] ENDOSCOPY;  Service:    • VASECTOMY     • WISDOM TOOTH EXTRACTION       Family History   Problem Relation Age of Onset   • Heart failure Mother         43 yoa   • Obesity Mother         reports very severe morbid obesity   • Malig Hyperthermia Neg Hx      Social History     Tobacco Use   • Smoking status: Current Every Day Smoker     Packs/day: 1.00     Types: Cigarettes     Start date: 10/5/1976   • Smokeless tobacco: Never Used   Substance Use Topics   • Alcohol use: No     Frequency: Never     Comment: occ   • Drug use: No     No medications prior to admission.     Allergies:  Prednisone      Objective      Vital Signs       Physical " Exam   Constitutional: He is oriented to person, place, and time. He appears well-developed and well-nourished.   HENT:   Head: Normocephalic and atraumatic.   Right Ear: External ear normal.   Left Ear: External ear normal.   Nose: Nose normal.   Mouth/Throat: Oropharynx is clear and moist.   Eyes: Conjunctivae and EOM are normal. Pupils are equal, round, and reactive to light.   Neck: Normal range of motion. Neck supple.   Cardiovascular: Normal rate, regular rhythm, normal heart sounds and intact distal pulses.   Pulmonary/Chest: Effort normal and breath sounds normal.   Abdominal: Soft. Bowel sounds are normal.   Musculoskeletal: Normal range of motion.   Neurological: He is alert and oriented to person, place, and time.   Skin: Skin is warm and dry.   Psychiatric: He has a normal mood and affect. His behavior is normal. Judgment and thought content normal.   Positive Phalen's and Tinel's Signs.    Results Review:   I reviewed the patient's new clinical results.      Assessment/Plan       * No active hospital problems. *      Assessment & Plan    I discussed the patients findings and my recommendations with patient    Miles De Los Santos MD  01/22/19  6:50 AM    Time:

## 2019-01-25 ENCOUNTER — ANESTHESIA (OUTPATIENT)
Dept: PERIOP | Facility: HOSPITAL | Age: 54
End: 2019-01-25

## 2019-01-25 ENCOUNTER — ANESTHESIA EVENT (OUTPATIENT)
Dept: PERIOP | Facility: HOSPITAL | Age: 54
End: 2019-01-25

## 2019-01-25 ENCOUNTER — HOSPITAL ENCOUNTER (OUTPATIENT)
Facility: HOSPITAL | Age: 54
Setting detail: HOSPITAL OUTPATIENT SURGERY
Discharge: HOME | End: 2019-01-25
Attending: PLASTIC SURGERY | Admitting: PLASTIC SURGERY

## 2019-01-25 VITALS
TEMPERATURE: 97.4 F | DIASTOLIC BLOOD PRESSURE: 86 MMHG | RESPIRATION RATE: 20 BRPM | SYSTOLIC BLOOD PRESSURE: 154 MMHG | HEART RATE: 58 BPM | OXYGEN SATURATION: 93 %

## 2019-01-25 PROCEDURE — 25010000002 MIDAZOLAM PER 1 MG: Performed by: ANESTHESIOLOGY

## 2019-01-25 PROCEDURE — 25010000002 PROPOFOL 10 MG/ML EMULSION: Performed by: NURSE ANESTHETIST, CERTIFIED REGISTERED

## 2019-01-25 PROCEDURE — 25010000002 FENTANYL CITRATE (PF) 100 MCG/2ML SOLUTION: Performed by: NURSE ANESTHETIST, CERTIFIED REGISTERED

## 2019-01-25 RX ORDER — ACETAMINOPHEN 650 MG/1
650 SUPPOSITORY RECTAL ONCE AS NEEDED
Status: DISCONTINUED | OUTPATIENT
Start: 2019-01-25 | End: 2019-01-25 | Stop reason: HOSPADM

## 2019-01-25 RX ORDER — HYDRALAZINE HYDROCHLORIDE 20 MG/ML
5 INJECTION INTRAMUSCULAR; INTRAVENOUS
Status: DISCONTINUED | OUTPATIENT
Start: 2019-01-25 | End: 2019-01-25 | Stop reason: HOSPADM

## 2019-01-25 RX ORDER — LIDOCAINE HYDROCHLORIDE 5 MG/ML
INJECTION, SOLUTION INFILTRATION; INTRAVENOUS AS NEEDED
Status: DISCONTINUED | OUTPATIENT
Start: 2019-01-25 | End: 2019-01-25 | Stop reason: SURG

## 2019-01-25 RX ORDER — EPHEDRINE SULFATE 50 MG/ML
5 INJECTION, SOLUTION INTRAVENOUS ONCE AS NEEDED
Status: DISCONTINUED | OUTPATIENT
Start: 2019-01-25 | End: 2019-01-25 | Stop reason: HOSPADM

## 2019-01-25 RX ORDER — FENTANYL CITRATE 50 UG/ML
50 INJECTION, SOLUTION INTRAMUSCULAR; INTRAVENOUS
Status: DISCONTINUED | OUTPATIENT
Start: 2019-01-25 | End: 2019-01-25 | Stop reason: HOSPADM

## 2019-01-25 RX ORDER — IBUPROFEN 600 MG/1
600 TABLET ORAL EVERY 6 HOURS PRN
Status: CANCELLED | OUTPATIENT
Start: 2019-01-25

## 2019-01-25 RX ORDER — PROPOFOL 10 MG/ML
VIAL (ML) INTRAVENOUS CONTINUOUS PRN
Status: DISCONTINUED | OUTPATIENT
Start: 2019-01-25 | End: 2019-01-25 | Stop reason: SURG

## 2019-01-25 RX ORDER — MIDAZOLAM HYDROCHLORIDE 1 MG/ML
2 INJECTION INTRAMUSCULAR; INTRAVENOUS
Status: DISCONTINUED | OUTPATIENT
Start: 2019-01-25 | End: 2019-01-25 | Stop reason: HOSPADM

## 2019-01-25 RX ORDER — HYDROCODONE BITARTRATE AND ACETAMINOPHEN 5; 325 MG/1; MG/1
1-2 TABLET ORAL EVERY 4 HOURS PRN
Qty: 30 TABLET | Refills: 0 | Status: SHIPPED | OUTPATIENT
Start: 2019-01-25 | End: 2019-04-23

## 2019-01-25 RX ORDER — HYDROMORPHONE HYDROCHLORIDE 1 MG/ML
0.5 INJECTION, SOLUTION INTRAMUSCULAR; INTRAVENOUS; SUBCUTANEOUS
Status: DISCONTINUED | OUTPATIENT
Start: 2019-01-25 | End: 2019-01-25 | Stop reason: HOSPADM

## 2019-01-25 RX ORDER — LABETALOL HYDROCHLORIDE 5 MG/ML
5 INJECTION, SOLUTION INTRAVENOUS
Status: DISCONTINUED | OUTPATIENT
Start: 2019-01-25 | End: 2019-01-25 | Stop reason: HOSPADM

## 2019-01-25 RX ORDER — ACETAMINOPHEN 325 MG/1
650 TABLET ORAL ONCE AS NEEDED
Status: DISCONTINUED | OUTPATIENT
Start: 2019-01-25 | End: 2019-01-25 | Stop reason: HOSPADM

## 2019-01-25 RX ORDER — HYDROCODONE BITARTRATE AND ACETAMINOPHEN 5; 325 MG/1; MG/1
1 TABLET ORAL ONCE AS NEEDED
Status: CANCELLED | OUTPATIENT
Start: 2019-01-25 | End: 2019-02-04

## 2019-01-25 RX ORDER — DIPHENHYDRAMINE HYDROCHLORIDE 50 MG/ML
12.5 INJECTION INTRAMUSCULAR; INTRAVENOUS
Status: DISCONTINUED | OUTPATIENT
Start: 2019-01-25 | End: 2019-01-25 | Stop reason: HOSPADM

## 2019-01-25 RX ORDER — NALOXONE HCL 0.4 MG/ML
0.2 VIAL (ML) INJECTION AS NEEDED
Status: DISCONTINUED | OUTPATIENT
Start: 2019-01-25 | End: 2019-01-25 | Stop reason: HOSPADM

## 2019-01-25 RX ORDER — OXYCODONE AND ACETAMINOPHEN 7.5; 325 MG/1; MG/1
1 TABLET ORAL ONCE AS NEEDED
Status: DISCONTINUED | OUTPATIENT
Start: 2019-01-25 | End: 2019-01-25 | Stop reason: HOSPADM

## 2019-01-25 RX ORDER — ONDANSETRON 2 MG/ML
4 INJECTION INTRAMUSCULAR; INTRAVENOUS ONCE AS NEEDED
Status: DISCONTINUED | OUTPATIENT
Start: 2019-01-25 | End: 2019-01-25 | Stop reason: HOSPADM

## 2019-01-25 RX ORDER — FLUMAZENIL 0.1 MG/ML
0.2 INJECTION INTRAVENOUS AS NEEDED
Status: DISCONTINUED | OUTPATIENT
Start: 2019-01-25 | End: 2019-01-25 | Stop reason: HOSPADM

## 2019-01-25 RX ORDER — PROMETHAZINE HYDROCHLORIDE 25 MG/ML
12.5 INJECTION, SOLUTION INTRAMUSCULAR; INTRAVENOUS ONCE AS NEEDED
Status: DISCONTINUED | OUTPATIENT
Start: 2019-01-25 | End: 2019-01-25 | Stop reason: HOSPADM

## 2019-01-25 RX ORDER — HYDRALAZINE HYDROCHLORIDE 20 MG/ML
10 INJECTION INTRAMUSCULAR; INTRAVENOUS EVERY 4 HOURS PRN
Status: DISCONTINUED | OUTPATIENT
Start: 2019-01-25 | End: 2019-01-25 | Stop reason: HOSPADM

## 2019-01-25 RX ORDER — HYDROCODONE BITARTRATE AND ACETAMINOPHEN 7.5; 325 MG/1; MG/1
1 TABLET ORAL ONCE AS NEEDED
Status: DISCONTINUED | OUTPATIENT
Start: 2019-01-25 | End: 2019-01-25 | Stop reason: HOSPADM

## 2019-01-25 RX ORDER — PROPOFOL 10 MG/ML
VIAL (ML) INTRAVENOUS AS NEEDED
Status: DISCONTINUED | OUTPATIENT
Start: 2019-01-25 | End: 2019-01-25 | Stop reason: SURG

## 2019-01-25 RX ORDER — MIDAZOLAM HYDROCHLORIDE 1 MG/ML
1 INJECTION INTRAMUSCULAR; INTRAVENOUS
Status: DISCONTINUED | OUTPATIENT
Start: 2019-01-25 | End: 2019-01-25 | Stop reason: HOSPADM

## 2019-01-25 RX ORDER — LIDOCAINE HYDROCHLORIDE 10 MG/ML
0.5 INJECTION, SOLUTION EPIDURAL; INFILTRATION; INTRACAUDAL; PERINEURAL ONCE AS NEEDED
Status: DISCONTINUED | OUTPATIENT
Start: 2019-01-25 | End: 2019-01-25 | Stop reason: HOSPADM

## 2019-01-25 RX ORDER — SODIUM CHLORIDE 0.9 % (FLUSH) 0.9 %
1-10 SYRINGE (ML) INJECTION AS NEEDED
Status: DISCONTINUED | OUTPATIENT
Start: 2019-01-25 | End: 2019-01-25 | Stop reason: HOSPADM

## 2019-01-25 RX ORDER — FENTANYL CITRATE 50 UG/ML
INJECTION, SOLUTION INTRAMUSCULAR; INTRAVENOUS AS NEEDED
Status: DISCONTINUED | OUTPATIENT
Start: 2019-01-25 | End: 2019-01-25 | Stop reason: SURG

## 2019-01-25 RX ORDER — FAMOTIDINE 10 MG/ML
20 INJECTION, SOLUTION INTRAVENOUS ONCE
Status: COMPLETED | OUTPATIENT
Start: 2019-01-25 | End: 2019-01-25

## 2019-01-25 RX ORDER — PROMETHAZINE HYDROCHLORIDE 25 MG/1
25 SUPPOSITORY RECTAL ONCE AS NEEDED
Status: DISCONTINUED | OUTPATIENT
Start: 2019-01-25 | End: 2019-01-25 | Stop reason: HOSPADM

## 2019-01-25 RX ORDER — PROMETHAZINE HYDROCHLORIDE 25 MG/1
25 TABLET ORAL ONCE AS NEEDED
Status: DISCONTINUED | OUTPATIENT
Start: 2019-01-25 | End: 2019-01-25 | Stop reason: HOSPADM

## 2019-01-25 RX ORDER — BACITRACIN ZINC 500 [USP'U]/G
OINTMENT TOPICAL AS NEEDED
Status: DISCONTINUED | OUTPATIENT
Start: 2019-01-25 | End: 2019-01-25 | Stop reason: HOSPADM

## 2019-01-25 RX ORDER — DIPHENHYDRAMINE HCL 25 MG
25 CAPSULE ORAL
Status: DISCONTINUED | OUTPATIENT
Start: 2019-01-25 | End: 2019-01-25 | Stop reason: HOSPADM

## 2019-01-25 RX ORDER — SODIUM CHLORIDE, SODIUM LACTATE, POTASSIUM CHLORIDE, CALCIUM CHLORIDE 600; 310; 30; 20 MG/100ML; MG/100ML; MG/100ML; MG/100ML
9 INJECTION, SOLUTION INTRAVENOUS CONTINUOUS
Status: DISCONTINUED | OUTPATIENT
Start: 2019-01-25 | End: 2019-01-25 | Stop reason: HOSPADM

## 2019-01-25 RX ORDER — BUPIVACAINE HYDROCHLORIDE 5 MG/ML
INJECTION, SOLUTION EPIDURAL; INTRACAUDAL AS NEEDED
Status: DISCONTINUED | OUTPATIENT
Start: 2019-01-25 | End: 2019-01-25 | Stop reason: HOSPADM

## 2019-01-25 RX ADMIN — MIDAZOLAM HYDROCHLORIDE 1 MG: 2 INJECTION, SOLUTION INTRAMUSCULAR; INTRAVENOUS at 12:36

## 2019-01-25 RX ADMIN — FAMOTIDINE 20 MG: 10 INJECTION, SOLUTION INTRAVENOUS at 12:49

## 2019-01-25 RX ADMIN — PROPOFOL 50 MG: 10 INJECTION, EMULSION INTRAVENOUS at 13:08

## 2019-01-25 RX ADMIN — LIDOCAINE HYDROCHLORIDE 50 ML: 5 INJECTION, SOLUTION INFILTRATION; INTRAVENOUS at 13:08

## 2019-01-25 RX ADMIN — FENTANYL CITRATE 50 MCG: 50 INJECTION INTRAMUSCULAR; INTRAVENOUS at 13:20

## 2019-01-25 RX ADMIN — PROPOFOL 120 MCG/KG/MIN: 10 INJECTION, EMULSION INTRAVENOUS at 13:08

## 2019-01-25 RX ADMIN — FENTANYL CITRATE 50 MCG: 50 INJECTION INTRAMUSCULAR; INTRAVENOUS at 13:27

## 2019-01-25 RX ADMIN — SODIUM CHLORIDE, POTASSIUM CHLORIDE, SODIUM LACTATE AND CALCIUM CHLORIDE 9 ML/HR: 600; 310; 30; 20 INJECTION, SOLUTION INTRAVENOUS at 12:36

## 2019-01-25 RX ADMIN — PROPOFOL 30 MG: 10 INJECTION, EMULSION INTRAVENOUS at 13:20

## 2019-01-25 NOTE — OP NOTE
Date of Procedure: January 25, 2019     PREOPERATIVE DIAGNOSIS: Carpal tunnel syndrome involving the right hand    POSTOPERATIVE DIAGNOSIS: Carpal tunnel syndrome involving the right hand    PROCEDURE: Decompression of the carpal tunnel of the right hand    SURGEON:  Miles De Los Santos MD    ASSISTANT: 0    ANESTHESIA:  Regional block of the right upper extremity    COMPLICATIONS:  0    ESTIMATED BLOOD LOSS:  Minimal    INDICATION FOR PROCEDURE:  This 53-year-old gentleman developed numbness, tingling, and discomfort involving the thumb index and long fingers of his right hand.  EMG and nerve conduction studies for the clinical diagnosis of carpal tunnel syndrome involving the right hand.  The treatment options were considered.  It was elected to proceed with the decompression of the carpal tunnel right hand.  The potential risks expected benefits of the operative procedure were discussed with the patient in the office.    DESCRIPTION OF PROCEDURE:      The patient was brought to the operating room and placed in the supine position.  A regional block of the right upper extremity was obtained by the anesthesiologist.  The right hand and arm were prepped with surgical soap and sterilely draped.  The operative procedure was performed under the hemostatic control of a pneumatic tourniquet.    A marking pen was used to indicate the proposed incision.  This incision paralleled the thenar flexor crease.  The skin incision was made with a #15 scalpel blade.  The palmar fibrosis was identified and divided.  Deeper structures were examined under 3.5 power loupe magnification.  The transverse carpal ligament was identified and divided.  Division was carried proximally and distally with the tenotomy scissors.  Care was taken to avoid injury the palmar cutaneous recurrent branches of the median nerve.    The median nerve was retracted and the thickened and opaque tenosynovium was then excised from the flexor tendons.    The skin  margins were reapproximated with interrupted 5-0 black nylon suture.    The pneumatic tourniquet was deflated and adequate reperfusion of the fingers and thumb was noted.    Bacitracin ointment, Adaptic, and a soft compression dressing were applied.  A volar fiberglass splint was finally applied.    The patient tolerated this operation well and left the operating room stable condition.

## 2019-01-25 NOTE — ANESTHESIA POSTPROCEDURE EVALUATION
Patient: Miles Munoz    Procedure Summary     Date:  01/25/19 Room / Location:   [facility] [address] [place] [address] [facility]    Anesthesia Start:  1301 Anesthesia Stop:  1400    Procedure:  DECOMPRESSION OF CARPAL TUNNEL RT HAND (Right Hand) Diagnosis:      Surgeon:  Miles De Los Santos MD Provider:  Christiano Coker MD    Anesthesia Type:  Chris block ASA Status:  3          Anesthesia Type: [place] block  Last vitals  BP   154/86 (01/25/19 1430)   Temp   36.3 °C (97.4 °F) (01/25/19 1355)   Pulse   58 (01/25/19 1430)   Resp   20 (01/25/19 1430)     SpO2   93 % (01/25/19 1430)     Post Anesthesia Care and Evaluation    Patient location during evaluation: bedside  Patient participation: complete - patient participated  Level of consciousness: awake  Pain score: 2  Pain management: adequate  Airway patency: patent  Anesthetic complications: No anesthetic complications  PONV Status: none  Cardiovascular status: acceptable  Respiratory status: acceptable  Hydration status: acceptable    Comments: /86   Pulse 58   Temp 36.3 °C (97.4 °F) (Temporal)   Resp 20   SpO2 93%

## 2019-01-25 NOTE — ANESTHESIA PREPROCEDURE EVALUATION
Anesthesia Evaluation     Patient summary reviewed and Nursing notes reviewed   NPO Solid Status: > 8 hours             Airway   Mallampati: II  TM distance: >3 FB  Neck ROM: full  Dental - normal exam     Pulmonary - normal exam   (+) sleep apnea on CPAP,   Cardiovascular - normal exam    (+) hyperlipidemia,       Neuro/Psych- negative ROS  GI/Hepatic/Renal/Endo    (+) morbid obesity, GERD,      Musculoskeletal     (+) back pain,   Abdominal    Substance History - negative use     OB/GYN negative ob/gyn ROS         Other   (+) arthritis                     Anesthesia Plan    ASA 3     Chris block     Anesthetic plan, all risks, benefits, and alternatives have been provided, discussed and informed consent has been obtained with: patient.

## 2019-01-25 NOTE — BRIEF OP NOTE
CARPAL TUNNEL RELEASE  Progress Note    Miles Munoz  1/25/2019    Pre-op Diagnosis:   Carpal Tunnel Syndrome Right Hand       Post-Op Diagnosis Codes:   same    Procedure/CPT® Codes:      Procedure(s):  DECOMPRESSION OF CARPAL TUNNEL RT HAND    Surgeon(s):  Miles De Los Santos MD    Anesthesia: Regional    Staff:   Circulator: Sonal Moore RN; Betsy Doan RN  Scrub Person: Niki Goode    Estimated Blood Loss: minimal    Urine Voided: * No values recorded between 1/25/2019 12:58 PM and 1/25/2019  1:45 PM *    Specimens:                none      Drains:      Findings: as above    Complications: none      Miles De Los Santos MD     Date: 1/25/2019  Time: 1:48 PM

## 2019-02-13 RX ORDER — ATORVASTATIN CALCIUM 20 MG/1
20 TABLET, FILM COATED ORAL DAILY
Qty: 30 TABLET | Refills: 5 | Status: SHIPPED | OUTPATIENT
Start: 2019-02-13 | End: 2019-07-08 | Stop reason: SDUPTHER

## 2019-03-11 DIAGNOSIS — F17.200 SMOKING: ICD-10-CM

## 2019-03-11 RX ORDER — BUPROPION HYDROCHLORIDE 150 MG/1
150 TABLET, EXTENDED RELEASE ORAL EVERY 12 HOURS SCHEDULED
Qty: 60 TABLET | Refills: 1 | Status: SHIPPED | OUTPATIENT
Start: 2019-03-11 | End: 2019-03-18 | Stop reason: SDUPTHER

## 2019-03-18 DIAGNOSIS — F17.200 SMOKING: ICD-10-CM

## 2019-03-18 RX ORDER — BUPROPION HYDROCHLORIDE 150 MG/1
150 TABLET, EXTENDED RELEASE ORAL EVERY 12 HOURS SCHEDULED
Qty: 180 TABLET | Refills: 0 | Status: SHIPPED | OUTPATIENT
Start: 2019-03-18 | End: 2021-01-21 | Stop reason: HOSPADM

## 2019-04-23 ENCOUNTER — OFFICE VISIT (OUTPATIENT)
Dept: FAMILY MEDICINE CLINIC | Facility: CLINIC | Age: 54
End: 2019-04-23

## 2019-04-23 VITALS
BODY MASS INDEX: 43.7 KG/M2 | TEMPERATURE: 97.7 F | OXYGEN SATURATION: 96 % | SYSTOLIC BLOOD PRESSURE: 138 MMHG | WEIGHT: 279 LBS | DIASTOLIC BLOOD PRESSURE: 72 MMHG | HEART RATE: 64 BPM

## 2019-04-23 DIAGNOSIS — F17.200 SMOKING: ICD-10-CM

## 2019-04-23 DIAGNOSIS — G89.29 CHRONIC BILATERAL LOW BACK PAIN WITHOUT SCIATICA: Primary | ICD-10-CM

## 2019-04-23 DIAGNOSIS — G56.03 BILATERAL CARPAL TUNNEL SYNDROME: ICD-10-CM

## 2019-04-23 DIAGNOSIS — M15.9 PRIMARY OSTEOARTHRITIS INVOLVING MULTIPLE JOINTS: ICD-10-CM

## 2019-04-23 DIAGNOSIS — M54.50 CHRONIC BILATERAL LOW BACK PAIN WITHOUT SCIATICA: Primary | ICD-10-CM

## 2019-04-23 PROCEDURE — 99213 OFFICE O/P EST LOW 20 MIN: CPT | Performed by: FAMILY MEDICINE

## 2019-04-23 PROCEDURE — 99406 BEHAV CHNG SMOKING 3-10 MIN: CPT | Performed by: FAMILY MEDICINE

## 2019-04-23 RX ORDER — GABAPENTIN 300 MG/1
300 CAPSULE ORAL 2 TIMES DAILY
Qty: 60 CAPSULE | Refills: 5 | Status: SHIPPED | OUTPATIENT
Start: 2019-04-23 | End: 2019-09-23 | Stop reason: SDUPTHER

## 2019-04-23 RX ORDER — MELOXICAM 15 MG/1
15 TABLET ORAL DAILY
Qty: 30 TABLET | Refills: 5 | Status: SHIPPED | OUTPATIENT
Start: 2019-04-23 | End: 2019-09-22 | Stop reason: SDUPTHER

## 2019-04-23 NOTE — PROGRESS NOTES
Subjective   Miles Munoz is a 53 y.o. male. Presents today for   Chief Complaint   Patient presents with   • Follow-up     has joint pain and would like gabapentin for joint pain       Osteoarthritis   Presents for initial visit. The disease course has been worsening. He complains of pain. He reports no joint swelling. Affected location: back and hands. His past medical history is significant for osteoarthritis. Treatments tried: Feels like gabapentin help back, does feel helped carpal tunnel pain in wrists.   Has chronic back pain, feels gabapentin did help and restarted, currently out.    Had Right Carpal Tunnel release, did well and in 4 days to have left done.  Right doing well;      Is still smoking, cut down to 8 per day;  Has slowly gotten down;  Feels wellbutrin helping.      Review of Systems   Musculoskeletal: Negative for joint swelling.       Patient Active Problem List   Diagnosis   • Occult blood positive stool   • Gastroesophageal reflux disease   • Tubulovillous adenoma of colon   • Multiple gastric ulcers   • Snores   • Witnessed apneic spells   • Obesity, morbid, BMI 40.0-49.9 (CMS/HCC)   • Sleep-related bruxism   • MARIA A on CPAP   • Hypersomnia with sleep apnea   • Sleep related hypoxia       Social History     Socioeconomic History   • Marital status:      Spouse name: Not on file   • Number of children: Not on file   • Years of education: Not on file   • Highest education level: Not on file   Tobacco Use   • Smoking status: Current Every Day Smoker     Packs/day: 1.00     Types: Cigarettes     Start date: 10/5/1976   • Smokeless tobacco: Never Used   Substance and Sexual Activity   • Alcohol use: No     Frequency: Never     Comment: occ   • Drug use: No   • Sexual activity: Defer       Allergies   Allergen Reactions   • Prednisone Other (See Comments)     Causes muscles to constrict        Current Outpatient Medications on File Prior to Visit   Medication Sig Dispense Refill   •  acetaminophen (TYLENOL) 325 MG tablet Take 650 mg by mouth Every 6 (Six) Hours As Needed for Mild Pain .     • atorvastatin (LIPITOR) 20 MG tablet Take 1 tablet by mouth Daily. 30 tablet 5   • buPROPion SR (WELLBUTRIN SR) 150 MG 12 hr tablet Take 1 tablet by mouth Every 12 (Twelve) Hours. 180 tablet 0   • cetirizine (zyrTEC) 10 MG tablet Take 10 mg by mouth Every Morning.     • gabapentin (NEURONTIN) 300 MG capsule Take 300 mg by mouth 2 (Two) Times a Day.  2   • omeprazole (priLOSEC) 40 MG capsule Take 1 capsule by mouth Daily. (Patient taking differently: Take 40 mg by mouth Every Morning.) 90 capsule 1   • sildenafil (VIAGRA) 100 MG tablet Take 100 mg by mouth Daily As Needed for erectile dysfunction. Instructed to withhold med 48 hrs prior to sx     • tiZANidine (ZANAFLEX) 4 MG tablet Take 1 tablet by mouth At Night As Needed for Muscle Spasms. 30 tablet 5   • [DISCONTINUED] HYDROcodone-acetaminophen (NORCO) 5-325 MG per tablet Take 1-2 tablets by mouth Every 4 (Four) Hours As Needed (Pain). 30 tablet 0   • [DISCONTINUED] doxycycline (VIBRAMYCIN) 100 MG capsule Take 1 capsule by mouth 2 (Two) Times a Day. 20 capsule 0     No current facility-administered medications on file prior to visit.        Objective   Vitals:    04/23/19 0751   BP: 138/72   Pulse: 64   Temp: 97.7 °F (36.5 °C)   SpO2: 96%   Weight: 127 kg (279 lb)       Physical Exam   Constitutional: He appears well-developed and well-nourished.   Neck: Neck supple.   Neurological: He is alert.   Skin: Skin is warm and dry.   Psychiatric: He has a normal mood and affect. His behavior is normal.   Nursing note and vitals reviewed.      Assessment/Plan   Miles was seen today for follow-up.    Diagnoses and all orders for this visit:    Chronic bilateral low back pain without sciatica  -     gabapentin (NEURONTIN) 300 MG capsule; Take 1 capsule by mouth 2 (Two) Times a Day.  -     meloxicam (MOBIC) 15 MG tablet; Take 1 tablet by mouth Daily.    Bilateral  carpal tunnel syndrome  -     gabapentin (NEURONTIN) 300 MG capsule; Take 1 capsule by mouth 2 (Two) Times a Day.    Primary osteoarthritis involving multiple joints  -     meloxicam (MOBIC) 15 MG tablet; Take 1 tablet by mouth Daily.    Discussed with long-term use of nsaids and concerns for renal, gi and cardiovascular risks.  Recommend use only prn and monitor renal function.  -I advised the patient of the risks in continuing to use tobacco, and I provided this patient with smoking cessation Rx.  I also discussed how to quit smoking and the patient has expressed the willingness to quit.  Will try adding low dose patches with wellbutrin.  -During this visit, I spent 3-10 mintues counseling the patient regarding smoking cessation.   Will add med for arthritis, warnings as above  Ok gabapentin for wrists and back.         -Follow up: 6 months and prn

## 2019-04-26 ENCOUNTER — ANESTHESIA EVENT (OUTPATIENT)
Dept: PERIOP | Facility: HOSPITAL | Age: 54
End: 2019-04-26

## 2019-04-26 ENCOUNTER — ANESTHESIA (OUTPATIENT)
Dept: PERIOP | Facility: HOSPITAL | Age: 54
End: 2019-04-26

## 2019-04-26 ENCOUNTER — HOSPITAL ENCOUNTER (OUTPATIENT)
Facility: HOSPITAL | Age: 54
Setting detail: HOSPITAL OUTPATIENT SURGERY
Discharge: HOME | End: 2019-04-26
Attending: PLASTIC SURGERY | Admitting: PLASTIC SURGERY

## 2019-04-26 VITALS
OXYGEN SATURATION: 97 % | WEIGHT: 279 LBS | HEIGHT: 67 IN | BODY MASS INDEX: 43.79 KG/M2 | DIASTOLIC BLOOD PRESSURE: 76 MMHG | SYSTOLIC BLOOD PRESSURE: 139 MMHG | TEMPERATURE: 97.1 F | RESPIRATION RATE: 16 BRPM | HEART RATE: 67 BPM

## 2019-04-26 PROCEDURE — 25010000002 MIDAZOLAM PER 1 MG: Performed by: ANESTHESIOLOGY

## 2019-04-26 PROCEDURE — 25010000002 FENTANYL CITRATE (PF) 100 MCG/2ML SOLUTION: Performed by: ANESTHESIOLOGY

## 2019-04-26 PROCEDURE — 25010000002 PROPOFOL 10 MG/ML EMULSION: Performed by: NURSE ANESTHETIST, CERTIFIED REGISTERED

## 2019-04-26 PROCEDURE — 25010000002 ONDANSETRON PER 1 MG: Performed by: NURSE ANESTHETIST, CERTIFIED REGISTERED

## 2019-04-26 RX ORDER — SODIUM CHLORIDE, SODIUM LACTATE, POTASSIUM CHLORIDE, CALCIUM CHLORIDE 600; 310; 30; 20 MG/100ML; MG/100ML; MG/100ML; MG/100ML
9 INJECTION, SOLUTION INTRAVENOUS CONTINUOUS
Status: DISCONTINUED | OUTPATIENT
Start: 2019-04-26 | End: 2019-04-26 | Stop reason: HOSPADM

## 2019-04-26 RX ORDER — HYDROCODONE BITARTRATE AND ACETAMINOPHEN 5; 325 MG/1; MG/1
1 TABLET ORAL ONCE AS NEEDED
Status: CANCELLED | OUTPATIENT
Start: 2019-04-26 | End: 2019-05-06

## 2019-04-26 RX ORDER — LIDOCAINE HYDROCHLORIDE 5 MG/ML
INJECTION, SOLUTION INFILTRATION; INTRAVENOUS
Status: DISCONTINUED
Start: 2019-04-26 | End: 2019-04-26 | Stop reason: HOSPADM

## 2019-04-26 RX ORDER — LIDOCAINE HYDROCHLORIDE 10 MG/ML
0.5 INJECTION, SOLUTION EPIDURAL; INFILTRATION; INTRACAUDAL; PERINEURAL ONCE AS NEEDED
Status: COMPLETED | OUTPATIENT
Start: 2019-04-26 | End: 2019-04-26

## 2019-04-26 RX ORDER — BACITRACIN ZINC 500 [USP'U]/G
OINTMENT TOPICAL AS NEEDED
Status: DISCONTINUED | OUTPATIENT
Start: 2019-04-26 | End: 2019-04-26 | Stop reason: HOSPADM

## 2019-04-26 RX ORDER — BUPIVACAINE HYDROCHLORIDE 5 MG/ML
INJECTION, SOLUTION EPIDURAL; INTRACAUDAL AS NEEDED
Status: DISCONTINUED | OUTPATIENT
Start: 2019-04-26 | End: 2019-04-26 | Stop reason: HOSPADM

## 2019-04-26 RX ORDER — ONDANSETRON 2 MG/ML
INJECTION INTRAMUSCULAR; INTRAVENOUS AS NEEDED
Status: DISCONTINUED | OUTPATIENT
Start: 2019-04-26 | End: 2019-04-26 | Stop reason: SURG

## 2019-04-26 RX ORDER — SODIUM CHLORIDE 0.9 % (FLUSH) 0.9 %
1-10 SYRINGE (ML) INJECTION AS NEEDED
Status: DISCONTINUED | OUTPATIENT
Start: 2019-04-26 | End: 2019-04-26 | Stop reason: HOSPADM

## 2019-04-26 RX ORDER — FENTANYL CITRATE 50 UG/ML
50 INJECTION, SOLUTION INTRAMUSCULAR; INTRAVENOUS
Status: DISCONTINUED | OUTPATIENT
Start: 2019-04-26 | End: 2019-04-26 | Stop reason: HOSPADM

## 2019-04-26 RX ORDER — MIDAZOLAM HYDROCHLORIDE 1 MG/ML
0.5 INJECTION INTRAMUSCULAR; INTRAVENOUS
Status: DISCONTINUED | OUTPATIENT
Start: 2019-04-26 | End: 2019-04-26 | Stop reason: HOSPADM

## 2019-04-26 RX ORDER — IBUPROFEN 600 MG/1
600 TABLET ORAL EVERY 6 HOURS PRN
Status: DISCONTINUED | OUTPATIENT
Start: 2019-04-26 | End: 2019-04-26 | Stop reason: HOSPADM

## 2019-04-26 RX ORDER — PROPOFOL 10 MG/ML
VIAL (ML) INTRAVENOUS CONTINUOUS PRN
Status: DISCONTINUED | OUTPATIENT
Start: 2019-04-26 | End: 2019-04-26 | Stop reason: SURG

## 2019-04-26 RX ORDER — HYDROCODONE BITARTRATE AND ACETAMINOPHEN 5; 325 MG/1; MG/1
1-2 TABLET ORAL EVERY 4 HOURS PRN
Qty: 30 TABLET | Refills: 0 | Status: SHIPPED | OUTPATIENT
Start: 2019-04-26 | End: 2021-04-13

## 2019-04-26 RX ORDER — MAGNESIUM HYDROXIDE 1200 MG/15ML
LIQUID ORAL AS NEEDED
Status: DISCONTINUED | OUTPATIENT
Start: 2019-04-26 | End: 2019-04-26 | Stop reason: HOSPADM

## 2019-04-26 RX ORDER — LIDOCAINE HYDROCHLORIDE 20 MG/ML
INJECTION, SOLUTION INFILTRATION; PERINEURAL AS NEEDED
Status: DISCONTINUED | OUTPATIENT
Start: 2019-04-26 | End: 2019-04-26 | Stop reason: SURG

## 2019-04-26 RX ORDER — MIDAZOLAM HYDROCHLORIDE 1 MG/ML
1 INJECTION INTRAMUSCULAR; INTRAVENOUS
Status: DISCONTINUED | OUTPATIENT
Start: 2019-04-26 | End: 2019-04-26 | Stop reason: HOSPADM

## 2019-04-26 RX ORDER — FAMOTIDINE 10 MG/ML
20 INJECTION, SOLUTION INTRAVENOUS ONCE
Status: COMPLETED | OUTPATIENT
Start: 2019-04-26 | End: 2019-04-26

## 2019-04-26 RX ADMIN — PROPOFOL 100 MCG/KG/MIN: 10 INJECTION, EMULSION INTRAVENOUS at 12:12

## 2019-04-26 RX ADMIN — SODIUM CHLORIDE, POTASSIUM CHLORIDE, SODIUM LACTATE AND CALCIUM CHLORIDE 9 ML/HR: 600; 310; 30; 20 INJECTION, SOLUTION INTRAVENOUS at 11:10

## 2019-04-26 RX ADMIN — LIDOCAINE HYDROCHLORIDE 0.5 ML: 10 INJECTION, SOLUTION EPIDURAL; INFILTRATION; INTRACAUDAL; PERINEURAL at 11:09

## 2019-04-26 RX ADMIN — MIDAZOLAM 1 MG: 1 INJECTION INTRAMUSCULAR; INTRAVENOUS at 11:16

## 2019-04-26 RX ADMIN — FENTANYL CITRATE 25 MCG: 50 INJECTION INTRAMUSCULAR; INTRAVENOUS at 12:32

## 2019-04-26 RX ADMIN — LIDOCAINE HYDROCHLORIDE 10 MG: 20 INJECTION, SOLUTION INFILTRATION; PERINEURAL at 12:12

## 2019-04-26 RX ADMIN — FAMOTIDINE 20 MG: 10 INJECTION INTRAVENOUS at 11:17

## 2019-04-26 RX ADMIN — FENTANYL CITRATE 25 MCG: 50 INJECTION INTRAMUSCULAR; INTRAVENOUS at 12:10

## 2019-04-26 RX ADMIN — ONDANSETRON 4 MG: 2 INJECTION INTRAMUSCULAR; INTRAVENOUS at 12:12

## 2019-04-26 RX ADMIN — MIDAZOLAM 2 MG: 1 INJECTION INTRAMUSCULAR; INTRAVENOUS at 12:10

## 2019-04-26 NOTE — ANESTHESIA POSTPROCEDURE EVALUATION
Patient: Miles Munoz    Procedure Summary     Date:  04/26/19 Room / Location:   [facility] [address] [place] [address] [facility]    Anesthesia Start:  1203 Anesthesia Stop:  1255    Procedure:  DECOMPRESSION OF CARPAL TUNNEL LEFT HAND (Left Hand) Diagnosis:      Surgeon:  Miles De Los Santos MD Provider:  Bharat Lara MD    Anesthesia Type:  [place] block ASA Status:  3          Anesthesia Type: Chris block  Last vitals  BP   139/76 (04/26/19 1340)   Temp   36.2 °C (97.1 °F) (04/26/19 1257)   Pulse   67 (04/26/19 1340)   Resp   16 (04/26/19 1340)     SpO2   97 % (04/26/19 1340)     Post Anesthesia Care and Evaluation    Patient location during evaluation: bedside  Patient participation: complete - patient participated  Level of consciousness: awake  Pain score: 1  Pain management: adequate  Airway patency: patent  Anesthetic complications: No anesthetic complications    Cardiovascular status: acceptable  Respiratory status: acceptable  Hydration status: acceptable    Comments: --------------------            04/26/19               1340     --------------------   BP:       139/76     Pulse:      67       Resp:       16       Temp:                SpO2:      97%      --------------------

## 2019-04-26 NOTE — ANESTHESIA PREPROCEDURE EVALUATION
Anesthesia Evaluation     Patient summary reviewed and Nursing notes reviewed   no history of anesthetic complications:  NPO Solid Status: > 8 hours  NPO Liquid Status: > 2 hours           Airway   Mallampati: II  TM distance: >3 FB  Neck ROM: full  Possible difficult intubation and Large neck circumference  Dental - normal exam     Pulmonary - normal exam   (+) a smoker Current Smoked day of surgery, sleep apnea (severe) on CPAP,   Cardiovascular - normal exam    ECG reviewed    (+) hyperlipidemia,       Neuro/Psych  GI/Hepatic/Renal/Endo    (+) obesity, morbid obesity, GERD, PUD,      Musculoskeletal     (+) back pain,   Abdominal   (+) obese,    Substance History      OB/GYN          Other   (+) arthritis                     Anesthesia Plan    ASA 3     [place] block     intravenous induction   Anesthetic plan, all risks, benefits, and alternatives have been provided, discussed and informed consent has been obtained with: patient.

## 2019-06-10 ENCOUNTER — OFFICE VISIT (OUTPATIENT)
Dept: SLEEP MEDICINE | Facility: HOSPITAL | Age: 54
End: 2019-06-10
Attending: INTERNAL MEDICINE

## 2019-06-10 VITALS
HEIGHT: 67 IN | OXYGEN SATURATION: 98 % | SYSTOLIC BLOOD PRESSURE: 147 MMHG | HEART RATE: 58 BPM | WEIGHT: 279 LBS | DIASTOLIC BLOOD PRESSURE: 80 MMHG | BODY MASS INDEX: 43.79 KG/M2

## 2019-06-10 DIAGNOSIS — G47.33 OSA ON CPAP: Primary | ICD-10-CM

## 2019-06-10 DIAGNOSIS — G47.63 SLEEP-RELATED BRUXISM: ICD-10-CM

## 2019-06-10 DIAGNOSIS — G47.34 SLEEP RELATED HYPOXIA: ICD-10-CM

## 2019-06-10 DIAGNOSIS — Z99.89 OSA ON CPAP: Primary | ICD-10-CM

## 2019-06-10 DIAGNOSIS — E66.01 OBESITY, MORBID, BMI 40.0-49.9 (HCC): ICD-10-CM

## 2019-06-10 PROCEDURE — G0463 HOSPITAL OUTPT CLINIC VISIT: HCPCS

## 2019-06-10 NOTE — PROGRESS NOTES
Sleep Disorder Follow Up    Patient Name: Miles Munoz  Age/Sex: 53 y.o. male  : 1965  MRN: [id number]    Date of Encounter Visit: 06/10/19  Referring Provider: Echo Marti MD  Place of Service: [facility]  Patient Care Team:  Aly Espinosa DO as PCP - General (Family Medicine)    PROBLEM LIST:  1. Severe obstructive sleep apnea on CPAP  2. Significant sleep related hypoxia  3. Sleep related bruxism  4. Obesity     History of Present Illness:  Miles Munoz is a 53 y.o. male who was last seen in the office on 18 for MARIA A. He had a home sleep study on 3/13/18 that showed an AHI of 30.7 that increased to 68.7 while in the left position and 75.9% of sleep time spent snoring. He was also noted to have significant sleep related hypoxia with bozena desaturation to 75% and 41.5 minute spent below 89%. Auto CPAP was ordered at 8-20 cmH20 which was later increased to 11-20 in 2018. He did have follow up Overnight oximetry on the new setting that showed good control.    Denies any changes in his medical history since last visit, he did have carpal tunnel surgery    Since last visit, he has been using the CPAP without serious problems with the mask or the air leak, he may need to adjust the head gear sometimes to limit the leak.  Patient is on CPAP and uses it every night with no complaints from the mask or the pressure.  Patient uses a full face mask (Maddie view), which does not fit well. Patient reports occasional  excessive air leak.   Patient's equipment supplier is [employer] and last mask replacement was about 3 months ago.  Patient sleeps better and has a deeper sleep with the CPAP and feels more energy during the day time.  Current CPAP setting 11-20 cm H20.  [place] Sleepiness Scale (ESS) is 11, which is up from 10 initially.  Compliance download was reviewed and documented below.  Weight is down by 4 pounds since last visit.  Other comorbidities include obesity.      Review of Systems:   A ten-system review was conducted and was negative.   Please refer to the follow up sleep questionnaire page one for details.    Past Medical History:  Past medical, surgical, social, and family history, except as mentioned above, was unchanged from the last visit.     Past Medical History:   Diagnosis Date   • Allergic rhinitis    • Arthritis    • Back pain    • GERD (gastroesophageal reflux disease)    • Hyperlipidemia    • Renal stone    • Seasonal allergies    • Sleep apnea     compliant with machine... DR. RAY TOLD PT HE DIDN'T NEED TO BRING..  I INSTRUCTED TO BRING       Past Surgical History:   Procedure Laterality Date   • CARPAL TUNNEL RELEASE Right 1/25/2019    Procedure: DECOMPRESSION OF CARPAL TUNNEL RT HAND;  Surgeon: Miles Ray MD;  Location: [facility];  Service: Plastics   • CARPAL TUNNEL RELEASE Left 4/26/2019    Procedure: DECOMPRESSION OF CARPAL TUNNEL LEFT HAND;  Surgeon: Miles Ray MD;  Location: [facility];  Service: Plastics   • COLONOSCOPY W/ POLYPECTOMY N/A 1/15/2018    Procedure: COLONOSCOPY WITH POLYPECTOMY TO CECUM HOT SNARE;  Surgeon: Leopoldo Thompson MD;  Location: [facility];  Service:    • CYSTOSCOPY      for renal stones   • ENDOSCOPY N/A 1/15/2018    Procedure: ESOPHAGOGASTRODUODENOSCOPY WITH BIOPSY;  Surgeon: Leopoldo Thompson MD;  Location: [facility];  Service:    • VASECTOMY     • WISDOM TOOTH EXTRACTION         Home Medications:     Current Outpatient Medications:   •  acetaminophen (TYLENOL) 325 MG tablet, Take 650 mg by mouth Every 6 (Six) Hours As Needed for Mild Pain ., Disp: , Rfl:   •  atorvastatin (LIPITOR) 20 MG tablet, Take 1 tablet by mouth Daily., Disp: 30 tablet, Rfl: 5  •  buPROPion SR (WELLBUTRIN SR) 150 MG 12 hr tablet, Take 1 tablet by mouth Every 12 (Twelve) Hours., Disp: 180 tablet, Rfl: 0  •  cetirizine (zyrTEC) 10 MG tablet, Take 10 mg by mouth Every Morning., Disp: , Rfl:   •   gabapentin (NEURONTIN) 300 MG capsule, Take 1 capsule by mouth 2 (Two) Times a Day., Disp: 60 capsule, Rfl: 5  •  HYDROcodone-acetaminophen (NORCO) 5-325 MG per tablet, Take 1-2 tablets by mouth Every 4 (Four) Hours As Needed (Pain)., Disp: 30 tablet, Rfl: 0  •  meloxicam (MOBIC) 15 MG tablet, Take 1 tablet by mouth Daily., Disp: 30 tablet, Rfl: 5  •  nicotine (NICODERM CQ) 7 MG/24HR patch, 1 patch daily x 6 weeks, then every other day for 6 weeks, then stop., Disp: 28 patch, Rfl: 2  •  omeprazole (priLOSEC) 40 MG capsule, Take 1 capsule by mouth Daily. (Patient taking differently: Take 40 mg by mouth Every Morning.), Disp: 90 capsule, Rfl: 1  •  sildenafil (VIAGRA) 100 MG tablet, Take 100 mg by mouth Daily As Needed for erectile dysfunction. Instructed to withhold med 48 hrs prior to sx, Disp: , Rfl:   •  tiZANidine (ZANAFLEX) 4 MG tablet, Take 1 tablet by mouth At Night As Needed for Muscle Spasms., Disp: 30 tablet, Rfl: 5    Allergies:  Allergies   Allergen Reactions   • Prednisone Other (See Comments)     Causes muscles to constrict        Past Social History:  Social History     Socioeconomic History   • Marital status:      Spouse name: Not on file   • Number of children: Not on file   • Years of education: Not on file   • Highest education level: Not on file   Tobacco Use   • Smoking status: Current Every Day Smoker     Packs/day: 1.00     Types: Cigarettes     Start date: 10/5/1976   • Smokeless tobacco: Never Used   Substance and Sexual Activity   • Alcohol use: No     Frequency: Never     Comment: occ   • Drug use: No   • Sexual activity: Defer       Past Family History:  Family History   Problem Relation Age of Onset   • Heart failure Mother         43 yoa   • Obesity Mother         reports very severe morbid obesity   • Malig Hyperthermia Neg Hx          Objective:   Done and documented on sleep disorders center physical examination sheet, please refer to hand written note on the chart for details  "about the other pertinent negative findings.    Vital Signs:   Visit Vitals  /80   Pulse 58   Ht 170.2 cm (67\")   Wt 127 kg (279 lb)   SpO2 98%   BMI 43.70 kg/m²     Wt Readings from Last 3 Encounters:   06/10/19 127 kg (279 lb)   04/25/19 127 kg (279 lb)   04/23/19 127 kg (279 lb)          Physical Exam:   General: AAOx3. Normal mood and affect.   HEENT:  Conjunctiva normal.  PERRLA.  Moist mucous membranes.  Septum midline. Mallampati 4 airway. Macroglossia.  Redundant lateral pharyngeal tissue   Neck: Neck supple. Trachea midline.  Normal thyroid.   LUNGS: Non-labored breathing. CTAB.  No wheezing.  HEART: regular rate and rhythm. No murmur. Normal s1/s2.  EXTREMITIES: 2+ BLE edema.No cyanosis or clubbing. Normal gait.    Diagnostic Data:  HST 3/13/2018:   This is a home sleep study done on 3/13/18, total recording time was 423.5 minutes with total monitoring time of 415.0 minutes.  Respiratory summary: Severe MARIA A with AHI of 30.7 with positional component pain worst in the left position with AHI of 68.7.  Oxygenation: Significant hypoxemia was bozena desaturation to 75% with 41.5 minutes spent in the hypoxemic range.  Patient had clusters of significant hypoxemia that are likely to be REM sleep related however no way to confirm without the EEG recording  Snoring: Patient had severe snoring at 75.9% of total recording time  Recommendations: auto CPAP 8-20 cmH20 and repeat overnight oximetry on CPAP.       Overnight oximetry on CPAP  room air done on 6/14/18  Patient had oxygenation in the lower 90s but still within normal range most of the night with only 0.6 minutes with sats below or equal to 88%  There is rare scattered desaturation with overall acceptable profile with no significant hypoxemia or clustering of desaturations    Compliance download from 3/12/19-6/9/19 showed 100% compliant with average use of 7 hours and 27 min.  Residual AHI 0.3 on auto CPAP 11-20 cm H2O with 8 min and 27 seconds of " average per night with large air leak.  Majority of the time of (90%) pressure was adequate below 15.7  cm H2O with a mean pressure of 13.2 cm H2O.        Assessment and Plan:       ICD-10-CM ICD-9-CM   1. MARIA A on CPAP G47.33 327.23    Z99.89 V46.8   2. Sleep related hypoxia G47.34 327.24   3. Obesity, morbid, BMI 40.0-49.9 (CMS/HCC) E66.01 278.01   4. Sleep-related bruxism G47.63 327.53       Recommendations:     Doing great on the CPAP with good control of the MARIA A, good adherence and no pressure adjustments needed. He is having subjective and clinical benefits from the CPAP and it is recommended to be continued  Overnight oximetry on CPAP showed good oxygentation  Will continue to encourage working on the weight loss  Follow up in one year    No orders of the defined types were placed in this encounter.    Return in about 1 year (around 6/10/2020).    Echo Marti MD   [facility]   06/10/19  4:06 PM    Dictated utilizing Dragon dictation

## 2019-07-08 RX ORDER — ATORVASTATIN CALCIUM 20 MG/1
TABLET, FILM COATED ORAL
Qty: 90 TABLET | Refills: 1 | Status: SHIPPED | OUTPATIENT
Start: 2019-07-08 | End: 2019-09-22 | Stop reason: SDUPTHER

## 2019-09-22 DIAGNOSIS — M54.50 CHRONIC BILATERAL LOW BACK PAIN WITHOUT SCIATICA: ICD-10-CM

## 2019-09-22 DIAGNOSIS — G89.29 CHRONIC BILATERAL LOW BACK PAIN WITHOUT SCIATICA: ICD-10-CM

## 2019-09-22 DIAGNOSIS — G56.03 BILATERAL CARPAL TUNNEL SYNDROME: ICD-10-CM

## 2019-09-22 DIAGNOSIS — M15.9 PRIMARY OSTEOARTHRITIS INVOLVING MULTIPLE JOINTS: ICD-10-CM

## 2019-09-23 DIAGNOSIS — G56.03 BILATERAL CARPAL TUNNEL SYNDROME: ICD-10-CM

## 2019-09-23 DIAGNOSIS — G89.29 CHRONIC BILATERAL LOW BACK PAIN WITHOUT SCIATICA: ICD-10-CM

## 2019-09-23 DIAGNOSIS — M54.50 CHRONIC BILATERAL LOW BACK PAIN WITHOUT SCIATICA: ICD-10-CM

## 2019-09-23 RX ORDER — MELOXICAM 15 MG/1
15 TABLET ORAL DAILY
Qty: 30 TABLET | Refills: 5 | Status: SHIPPED | OUTPATIENT
Start: 2019-09-23 | End: 2020-03-31

## 2019-09-23 RX ORDER — GABAPENTIN 300 MG/1
300 CAPSULE ORAL 2 TIMES DAILY
Qty: 60 CAPSULE | Refills: 5 | Status: CANCELLED | OUTPATIENT
Start: 2019-09-23

## 2019-09-23 RX ORDER — ATORVASTATIN CALCIUM 20 MG/1
20 TABLET, FILM COATED ORAL DAILY
Qty: 90 TABLET | Refills: 1 | Status: SHIPPED | OUTPATIENT
Start: 2019-09-23 | End: 2020-08-06

## 2019-09-23 RX ORDER — GABAPENTIN 300 MG/1
CAPSULE ORAL
Qty: 180 CAPSULE | Refills: 1 | Status: CANCELLED | OUTPATIENT
Start: 2019-09-23

## 2019-09-23 RX ORDER — GABAPENTIN 300 MG/1
300 CAPSULE ORAL 2 TIMES DAILY
Qty: 60 CAPSULE | Refills: 0 | Status: SHIPPED | OUTPATIENT
Start: 2019-09-23 | End: 2019-10-21 | Stop reason: SDUPTHER

## 2019-10-11 ENCOUNTER — CLINICAL SUPPORT (OUTPATIENT)
Dept: FAMILY MEDICINE CLINIC | Facility: CLINIC | Age: 54
End: 2019-10-11

## 2019-10-11 VITALS
HEIGHT: 67 IN | HEART RATE: 65 BPM | SYSTOLIC BLOOD PRESSURE: 130 MMHG | OXYGEN SATURATION: 94 % | WEIGHT: 278 LBS | DIASTOLIC BLOOD PRESSURE: 82 MMHG | BODY MASS INDEX: 43.63 KG/M2

## 2019-10-21 ENCOUNTER — OFFICE VISIT (OUTPATIENT)
Dept: FAMILY MEDICINE CLINIC | Facility: CLINIC | Age: 54
End: 2019-10-21

## 2019-10-21 VITALS
WEIGHT: 278 LBS | HEART RATE: 63 BPM | DIASTOLIC BLOOD PRESSURE: 68 MMHG | BODY MASS INDEX: 43.63 KG/M2 | HEIGHT: 67 IN | SYSTOLIC BLOOD PRESSURE: 128 MMHG | OXYGEN SATURATION: 96 %

## 2019-10-21 DIAGNOSIS — G56.03 BILATERAL CARPAL TUNNEL SYNDROME: ICD-10-CM

## 2019-10-21 DIAGNOSIS — Z23 FLU VACCINE NEED: ICD-10-CM

## 2019-10-21 DIAGNOSIS — M54.50 CHRONIC BILATERAL LOW BACK PAIN WITHOUT SCIATICA: ICD-10-CM

## 2019-10-21 DIAGNOSIS — R00.2 PALPITATIONS: ICD-10-CM

## 2019-10-21 DIAGNOSIS — E78.2 MIXED HYPERLIPIDEMIA: Primary | ICD-10-CM

## 2019-10-21 DIAGNOSIS — G89.29 CHRONIC BILATERAL LOW BACK PAIN WITHOUT SCIATICA: ICD-10-CM

## 2019-10-21 PROBLEM — E78.5 HYPERLIPIDEMIA: Status: ACTIVE | Noted: 2019-10-21

## 2019-10-21 PROCEDURE — 90674 CCIIV4 VAC NO PRSV 0.5 ML IM: CPT | Performed by: FAMILY MEDICINE

## 2019-10-21 PROCEDURE — 99213 OFFICE O/P EST LOW 20 MIN: CPT | Performed by: FAMILY MEDICINE

## 2019-10-21 PROCEDURE — 90471 IMMUNIZATION ADMIN: CPT | Performed by: FAMILY MEDICINE

## 2019-10-21 RX ORDER — GABAPENTIN 300 MG/1
300 CAPSULE ORAL 2 TIMES DAILY
Qty: 60 CAPSULE | Refills: 5 | Status: SHIPPED | OUTPATIENT
Start: 2019-10-21 | End: 2019-10-21 | Stop reason: SDUPTHER

## 2019-10-21 RX ORDER — GABAPENTIN 300 MG/1
300 CAPSULE ORAL 2 TIMES DAILY
Qty: 180 CAPSULE | Refills: 1 | Status: SHIPPED | OUTPATIENT
Start: 2019-10-21 | End: 2020-04-22

## 2019-10-21 NOTE — PROGRESS NOTES
Subjective   Miles Munoz is a 54 y.o. male. Presents today for   Chief Complaint   Patient presents with   • Hyperlipidemia   • Anxiety     Since last seen had cts release;  Had trigger finger right thumb and hurts;  Hand surgeon did inject and helped for 1.5 months.  Thinks can live with.  Doesn't want surgery.  Reports can feel again which is improved.    Hyperlipidemia   This is a chronic problem. The current episode started more than 1 year ago. The problem is controlled. Recent lipid tests were reviewed and are normal. Exacerbating diseases include obesity. Pertinent negatives include no chest pain or shortness of breath. Current antihyperlipidemic treatment includes statins. The current treatment provides moderate improvement of lipids. There are no compliance problems.  Risk factors for coronary artery disease include dyslipidemia and obesity.   Palpitations    This is a new problem. Episode onset: 2 months. The problem occurs intermittently. The problem has been waxing and waning. Episode Length: 5 to 10 minutes. The symptoms are aggravated by caffeine. Pertinent negatives include no chest fullness, chest pain, dizziness, irregular heartbeat, near-syncope, shortness of breath or syncope. Associated symptoms comments: Can feel palpitations.  More at night.    Did take back as , but different store.. He has tried nothing for the symptoms. The treatment provided no relief. Risk factors include being male, obesity and smoking/tobacco exposure. There is no history of anemia or heart disease.       Review of Systems   Respiratory: Negative for shortness of breath.    Cardiovascular: Positive for palpitations. Negative for chest pain, syncope and near-syncope.   Neurological: Negative for dizziness.       Patient Active Problem List   Diagnosis   • Occult blood positive stool   • Gastroesophageal reflux disease   • Tubulovillous adenoma of colon   • Multiple gastric ulcers   • Snores   • Witnessed  apneic spells   • Obesity, morbid, BMI 40.0-49.9 (CMS/[facility])   • Sleep-related bruxism   • MARIA A on CPAP   • Hypersomnia with sleep apnea   • Sleep related hypoxia   • Hyperlipidemia       Social History     Socioeconomic History   • Marital status:      Spouse name: Not on file   • Number of children: Not on file   • Years of education: Not on file   • Highest education level: Not on file   Tobacco Use   • Smoking status: Current Every Day Smoker     Packs/day: 1.00     Types: Cigarettes     Start date: 10/5/1976   • Smokeless tobacco: Never Used   Substance and Sexual Activity   • Alcohol use: No     Frequency: Never     Comment: occ   • Drug use: No   • Sexual activity: Defer       Allergies   Allergen Reactions   • Prednisone Other (See Comments)     Causes muscles to constrict        Current Outpatient Medications on File Prior to Visit   Medication Sig Dispense Refill   • acetaminophen (TYLENOL) 325 MG tablet Take 650 mg by mouth Every 6 (Six) Hours As Needed for Mild Pain .     • atorvastatin (LIPITOR) 20 MG tablet Take 1 tablet by mouth Daily. 90 tablet 1   • buPROPion SR (WELLBUTRIN SR) 150 MG 12 hr tablet Take 1 tablet by mouth Every 12 (Twelve) Hours. 180 tablet 0   • cetirizine (zyrTEC) 10 MG tablet Take 10 mg by mouth Every Morning.     • gabapentin (NEURONTIN) 300 MG capsule Take 1 capsule by mouth 2 (Two) Times a Day. 60 capsule 0   • HYDROcodone-acetaminophen (NORCO) 5-325 MG per tablet Take 1-2 tablets by mouth Every 4 (Four) Hours As Needed (Pain). 30 tablet 0   • meloxicam (MOBIC) 15 MG tablet Take 1 tablet by mouth Daily. 30 tablet 5   • omeprazole (priLOSEC) 40 MG capsule Take 1 capsule by mouth Daily. (Patient taking differently: Take 40 mg by mouth Every Morning.) 90 capsule 1   • sildenafil (VIAGRA) 100 MG tablet Take 100 mg by mouth Daily As Needed for erectile dysfunction. Instructed to withhold med 48 hrs prior to sx     • tiZANidine (ZANAFLEX) 4 MG tablet Take 1 tablet by mouth At  "Night As Needed for Muscle Spasms. 30 tablet 5   • [DISCONTINUED] nicotine (NICODERM CQ) 7 MG/24HR patch 1 patch daily x 6 weeks, then every other day for 6 weeks, then stop. 28 patch 2     No current facility-administered medications on file prior to visit.        Objective   Vitals:    10/21/19 1713   BP: 128/68   BP Location: Left arm   Patient Position: Sitting   Cuff Size: Adult   Pulse: 63   SpO2: 96%   Weight: 126 kg (278 lb)   Height: 170.2 cm (67\")       Physical Exam   Constitutional: He appears well-developed and well-nourished.   HENT:   Head: Normocephalic and atraumatic.   Neck: Neck supple. No JVD present. No thyromegaly present.   Cardiovascular: Normal rate, regular rhythm and normal heart sounds. Exam reveals no gallop and no friction rub.   No murmur heard.  Pulmonary/Chest: Effort normal and breath sounds normal. No respiratory distress. He has no wheezes. He has no rales.   Abdominal: Soft. Bowel sounds are normal. He exhibits no distension. There is no tenderness. There is no rebound and no guarding.   Musculoskeletal: He exhibits no edema.   Neurological: He is alert.   Skin: Skin is warm and dry.   Psychiatric: He has a normal mood and affect. His behavior is normal.   Nursing note and vitals reviewed.      Assessment/Plan   Miles was seen today for hyperlipidemia and anxiety.    Diagnoses and all orders for this visit:    Mixed hyperlipidemia  -     Lipid Panel    Palpitations  -     CBC & Differential  -     Comprehensive Metabolic Panel  -     TSH  -     Magnesium  -     Holter monitor - 24 hour; Future    Flu vaccine need  -     Flucelvax Quad=>4Years (PFS)    -check holter, lytes, thyroid and blood counts;  GO ER if worsening or other symptoms  -HLD - continue statin, recheck lipids.  -flu today    If don't hear on scheduling or results to call.         -Follow up:  6 months and prn  "

## 2019-10-24 LAB
ALBUMIN SERPL-MCNC: 4.2 G/DL (ref 3.5–5.2)
ALBUMIN/GLOB SERPL: 1.5 G/DL
ALP SERPL-CCNC: 63 U/L (ref 39–117)
ALT SERPL-CCNC: 21 U/L (ref 1–41)
AST SERPL-CCNC: 14 U/L (ref 1–40)
BILIRUB SERPL-MCNC: 0.3 MG/DL (ref 0.2–1.2)
BUN SERPL-MCNC: 13 MG/DL (ref 6–20)
BUN/CREAT SERPL: 14.1 (ref 7–25)
CALCIUM SERPL-MCNC: 9 MG/DL (ref 8.6–10.5)
CHLORIDE SERPL-SCNC: 104 MMOL/L (ref 98–107)
CHOLEST SERPL-MCNC: 138 MG/DL (ref 0–200)
CO2 SERPL-SCNC: 25.3 MMOL/L (ref 22–29)
CREAT SERPL-MCNC: 0.92 MG/DL (ref 0.76–1.27)
DIFFERENTIAL COMMENT: NORMAL
ERYTHROCYTE [DISTWIDTH] IN BLOOD BY AUTOMATED COUNT: 15.8 % (ref 12.3–15.4)
GLOBULIN SER CALC-MCNC: 2.8 GM/DL
GLUCOSE SERPL-MCNC: 90 MG/DL (ref 65–99)
HCT VFR BLD AUTO: 38.3 % (ref 37.5–51)
HDLC SERPL-MCNC: 39 MG/DL (ref 40–60)
HGB BLD-MCNC: 11.4 G/DL (ref 13–17.7)
LDLC SERPL CALC-MCNC: 77 MG/DL (ref 0–100)
MAGNESIUM SERPL-MCNC: 2 MG/DL (ref 1.6–2.6)
MCH RBC QN AUTO: 22.7 PG (ref 26.6–33)
MCHC RBC AUTO-ENTMCNC: 29.8 G/DL (ref 31.5–35.7)
MCV RBC AUTO: 76.1 FL (ref 79–97)
NEUTROPHILS NFR BLD MANUAL: NORMAL %
PLATELET # BLD AUTO: 260 10*3/MM3 (ref 140–450)
PLATELET BLD QL SMEAR: NORMAL
POTASSIUM SERPL-SCNC: 4.5 MMOL/L (ref 3.5–5.2)
PROT SERPL-MCNC: 7 G/DL (ref 6–8.5)
RBC # BLD AUTO: 5.03 10*6/MM3 (ref 4.14–5.8)
RBC MORPH BLD: NORMAL
SODIUM SERPL-SCNC: 142 MMOL/L (ref 136–145)
TRIGL SERPL-MCNC: 111 MG/DL (ref 0–150)
TSH SERPL DL<=0.005 MIU/L-ACNC: 1.45 UIU/ML (ref 0.27–4.2)
VLDLC SERPL CALC-MCNC: 22.2 MG/DL
WBC # BLD AUTO: 9.25 10*3/MM3 (ref 3.4–10.8)

## 2019-10-28 NOTE — PROGRESS NOTES
Call and mail copy of results to patient.  Mild anemia - add on or check ferritin, iron profile, b12 and folate;  Check FOBT x 3  Thyroid normal  Electrolytes normal.

## 2019-10-29 DIAGNOSIS — D64.9 ANEMIA, UNSPECIFIED TYPE: Primary | ICD-10-CM

## 2019-10-29 LAB
FERRITIN SERPL-MCNC: 4.43 NG/ML (ref 30–400)
FOLATE SERPL-MCNC: 6.87 NG/ML (ref 4.78–24.2)
IRON SERPL-MCNC: 18 MCG/DL (ref 59–158)
Lab: NORMAL
VIT B12 SERPL-MCNC: 1058 PG/ML (ref 211–946)
WRITTEN AUTHORIZATION: NORMAL

## 2019-10-30 RX ORDER — DOXYCYCLINE HYCLATE 50 MG/1
324 CAPSULE, GELATIN COATED ORAL 2 TIMES DAILY WITH MEALS
Qty: 60 TABLET | Refills: 2 | Status: SHIPPED | OUTPATIENT
Start: 2019-10-30 | End: 2020-02-17

## 2019-10-30 NOTE — PROGRESS NOTES
Call results to patient.  Patient has iron deficiency, when was last c-scope?  Also make sure turn in FOBT x3  Start ferrous gluconate 324mg 1 po bid #60 with 2 refs.  Check CBC and ferritin level in 8 weeks dx iron deficiency anemia.

## 2019-11-03 ENCOUNTER — RESULTS ENCOUNTER (OUTPATIENT)
Dept: FAMILY MEDICINE CLINIC | Facility: CLINIC | Age: 54
End: 2019-11-03

## 2019-11-03 DIAGNOSIS — D64.9 ANEMIA, UNSPECIFIED TYPE: ICD-10-CM

## 2019-11-12 DIAGNOSIS — D64.9 ANEMIA, UNSPECIFIED TYPE: Primary | ICD-10-CM

## 2019-11-12 PROCEDURE — 82274 ASSAY TEST FOR BLOOD FECAL: CPT | Performed by: FAMILY MEDICINE

## 2019-11-17 NOTE — PROGRESS NOTES
Call results to patient.  1 of 3 fobt tested +, continue iron and recheck cbc since had endoscopy 2018.

## 2019-11-19 ENCOUNTER — HOSPITAL ENCOUNTER (OUTPATIENT)
Dept: CARDIOLOGY | Facility: HOSPITAL | Age: 54
Discharge: HOME | End: 2019-11-19
Admitting: FAMILY MEDICINE

## 2019-11-19 DIAGNOSIS — R00.2 PALPITATIONS: ICD-10-CM

## 2019-11-19 PROCEDURE — 93225 XTRNL ECG REC<48 HRS REC: CPT

## 2019-11-25 PROCEDURE — 93227 XTRNL ECG REC<48 HR R&I: CPT | Performed by: INTERNAL MEDICINE

## 2019-12-16 ENCOUNTER — OFFICE VISIT (OUTPATIENT)
Dept: FAMILY MEDICINE CLINIC | Facility: CLINIC | Age: 54
End: 2019-12-16

## 2019-12-16 VITALS
HEIGHT: 67 IN | WEIGHT: 274 LBS | OXYGEN SATURATION: 96 % | SYSTOLIC BLOOD PRESSURE: 135 MMHG | HEART RATE: 80 BPM | DIASTOLIC BLOOD PRESSURE: 68 MMHG | TEMPERATURE: 98.2 F | BODY MASS INDEX: 43 KG/M2

## 2019-12-16 DIAGNOSIS — H66.001 NON-RECURRENT ACUTE SUPPURATIVE OTITIS MEDIA OF RIGHT EAR WITHOUT SPONTANEOUS RUPTURE OF TYMPANIC MEMBRANE: Primary | ICD-10-CM

## 2019-12-16 PROCEDURE — 99213 OFFICE O/P EST LOW 20 MIN: CPT | Performed by: NURSE PRACTITIONER

## 2019-12-16 RX ORDER — AMOXICILLIN AND CLAVULANATE POTASSIUM 875; 125 MG/1; MG/1
1 TABLET, FILM COATED ORAL 2 TIMES DAILY
Qty: 14 TABLET | Refills: 0 | Status: SHIPPED | OUTPATIENT
Start: 2019-12-16 | End: 2020-04-23

## 2019-12-16 NOTE — PROGRESS NOTES
"Subjective   Miles Munoz is a 54 y.o. male. Patient presents today with a URI.     History of Present Illness   Smoker, reports 2 day history of fatigue, nasal congestion, head pressure, tried sleeping with CPAP, but unable. Unable to sleep without PAP therapy. Increased ear pain and popping. Just back from [place] and daughter in law was sick. Taking dayquil and sucrets to address symptoms, taking mucinex in between to address.  The following portions of the patient's history were reviewed and updated as appropriate: allergies, current medications, past family history, past medical history, past social history, past surgical history and problem list.    Review of Systems   Constitutional: Negative for chills and fever.   HENT: Positive for congestion, ear pain, postnasal drip, sinus pressure and sore throat.    Respiratory: Positive for cough (minor).    Cardiovascular: Negative.    Gastrointestinal: Negative.    Neurological: Positive for headache.   Hematological: Negative.    Psychiatric/Behavioral: Negative.      /68 (BP Location: Right arm, Patient Position: Sitting, Cuff Size: Adult)   Pulse 80   Temp 98.2 °F (36.8 °C) (Oral)   Ht 170.2 cm (67\")   Wt 124 kg (274 lb)   SpO2 96%   BMI 42.91 kg/m²     Objective   Physical Exam   Constitutional: He is oriented to person, place, and time. He appears well-developed and well-nourished. No distress.   HENT:   Right Ear: Tympanic membrane is bulging. A middle ear effusion is present.   Left Ear: Tympanic membrane normal.   Nose: Mucosal edema and rhinorrhea present. Right sinus exhibits maxillary sinus tenderness and frontal sinus tenderness. Left sinus exhibits maxillary sinus tenderness and frontal sinus tenderness.   Mouth/Throat: Uvula is midline and mucous membranes are normal. Oropharyngeal exudate and posterior oropharyngeal erythema present.   Neck: Normal range of motion. Neck supple. No tracheal deviation present. No thyromegaly present. "   Cardiovascular: Normal rate, regular rhythm, normal heart sounds and intact distal pulses.   Pulmonary/Chest: Effort normal and breath sounds normal.   Musculoskeletal: He exhibits no edema.   Lymphadenopathy:     He has no cervical adenopathy.   Neurological: He is alert and oriented to person, place, and time.   Skin: Skin is warm and dry. Capillary refill takes less than 2 seconds.   Psychiatric: He has a normal mood and affect. His behavior is normal. Judgment and thought content normal.   Nursing note and vitals reviewed.    Assessment/Plan   Problems Addressed this Visit     None      Visit Diagnoses     Non-recurrent acute suppurative otitis media of right ear without spontaneous rupture of tympanic membrane    -  Primary    Relevant Medications    amoxicillin-clavulanate (AUGMENTIN) 875-125 MG per tablet        AOM--continue symptom controllers and FU if not settling 1 week.

## 2020-02-11 DIAGNOSIS — R00.2 PALPITATIONS: Primary | ICD-10-CM

## 2020-02-17 RX ORDER — DOXYCYCLINE HYCLATE 50 MG/1
CAPSULE, GELATIN COATED ORAL
Qty: 60 TABLET | Refills: 5 | Status: SHIPPED | OUTPATIENT
Start: 2020-02-17 | End: 2022-01-28

## 2020-03-31 DIAGNOSIS — M15.9 PRIMARY OSTEOARTHRITIS INVOLVING MULTIPLE JOINTS: ICD-10-CM

## 2020-03-31 DIAGNOSIS — G89.29 CHRONIC BILATERAL LOW BACK PAIN WITHOUT SCIATICA: ICD-10-CM

## 2020-03-31 DIAGNOSIS — M54.50 CHRONIC BILATERAL LOW BACK PAIN WITHOUT SCIATICA: ICD-10-CM

## 2020-03-31 RX ORDER — MELOXICAM 15 MG/1
TABLET ORAL
Qty: 30 TABLET | Refills: 0 | Status: SHIPPED | OUTPATIENT
Start: 2020-03-31 | End: 2020-05-04

## 2020-04-08 ENCOUNTER — TELEMEDICINE (OUTPATIENT)
Dept: CARDIOLOGY | Facility: CLINIC | Age: 55
End: 2020-04-08

## 2020-04-08 VITALS — WEIGHT: 268 LBS | BODY MASS INDEX: 42.06 KG/M2 | HEIGHT: 67 IN

## 2020-04-08 DIAGNOSIS — R00.2 PALPITATIONS: Primary | ICD-10-CM

## 2020-04-08 DIAGNOSIS — E78.2 MIXED HYPERLIPIDEMIA: ICD-10-CM

## 2020-04-08 PROCEDURE — 99204 OFFICE O/P NEW MOD 45 MIN: CPT | Performed by: INTERNAL MEDICINE

## 2020-04-08 NOTE — PROGRESS NOTES
Shola Zamora MD, PhD  [facility]  Cardiology clinic video visit note  Subjective:     Encounter Date:04/08/2020      Patient ID: Miles Munoz is a 54 y.o. male.    Chief Complaint:  Chief Complaint   Patient presents with   • Palpitations   • Shortness of Breath       HPI:  History of Present Illness  I the pleasure to see this patient today as referral from Dr. Espinosa with primary care for the complaint of palpitations, he has no significant cardiac history of MI, no complaint of shortness of breath although he feels episodic palpitations more so at night and have lessened somewhat with change of job position to low stressful position.  Palpitations appear like a fluttering sensation more so at night no associated shortness of breath or dyspnea or dizziness or diaphoresis or chest pain.  He had a Holter monitor back in November 2019 which showed occasional PACs only with no PVCs.  He is not had a 2D echo for structural functional valuation to date.  He had no ischemic evaluation.  He does have hyperlipidemia and his last LDL was at target at 77 with low-cholesterol less than 140 and 20 mg atorvastatin.  He has no anginal symptoms and no heart failure symptoms.  He is able lay flat at night without difficulty.  He has no PND orthopnea.  He had no no explain passing out spells or falls.  After long discussion with the patient today via video visit we elected to defer 2 week Holter monitor as well as 2D echo for another 30 to 60 days given community viral infection risks.  I reviewed his labs from October to November 2019 he had normal TSH, mildly low but not significantly low hemoglobin with iron deficiency, normal renal function and electrolytes, normal liver function testing.  He has no unexplained headaches or other concerning things.  No fever chill sweats nausea vomiting or diarrhea.    Review of systems x14 review systems is negative mentioned above  Historical data copied forward from  previous encounters and EMR is unchanged    The following portions of the patient's history were reviewed and updated as appropriate: allergies, current medications, past family history, past medical history, past social history, past surgical history and problem list.    Problem List:  Patient Active Problem List   Diagnosis   • Occult blood positive stool   • Gastroesophageal reflux disease   • Tubulovillous adenoma of colon   • Multiple gastric ulcers   • Snores   • Witnessed apneic spells   • Obesity, morbid, BMI 40.0-49.9 (CMS/HCC)   • Sleep-related bruxism   • MARIA A on CPAP   • Hypersomnia with sleep apnea   • Sleep related hypoxia   • Hyperlipidemia       Past Medical History:  Past Medical History:   Diagnosis Date   • Allergic rhinitis    • Arthritis    • Back pain    • GERD (gastroesophageal reflux disease)    • Hyperlipidemia    • Renal stone    • Seasonal allergies    • Sleep apnea     compliant with machine... DR. RAY TOLD PT HE DIDN'T NEED TO BRING..  I INSTRUCTED TO BRING       Past Surgical History:  Past Surgical History:   Procedure Laterality Date   • CARPAL TUNNEL RELEASE Right 1/25/2019    Procedure: DECOMPRESSION OF CARPAL TUNNEL RT HAND;  Surgeon: Miles Ray MD;  Location: [facility] OR [facility] [place];  Service: Plastics   • CARPAL TUNNEL RELEASE Left 4/26/2019    Procedure: DECOMPRESSION OF CARPAL TUNNEL LEFT HAND;  Surgeon: Miles Ray MD;  Location: [facility] OR [facility];  Service: Plastics   • COLONOSCOPY W/ POLYPECTOMY N/A 1/15/2018    Procedure: COLONOSCOPY WITH POLYPECTOMY TO CECUM HOT SNARE;  Surgeon: Leopoldo Thompson MD;  Location: [facility] ENDOSCOPY;  Service:    • CYSTOSCOPY      for renal stones   • ENDOSCOPY N/A 1/15/2018    Procedure: ESOPHAGOGASTRODUODENOSCOPY WITH BIOPSY;  Surgeon: Leopoldo Thompson MD;  Location: [facility] ENDOSCOPY;  Service:    • VASECTOMY     • WISDOM TOOTH EXTRACTION         Social History:  Social History     Socioeconomic History   • Marital status:  "     Spouse name: Not on file   • Number of children: Not on file   • Years of education: Not on file   • Highest education level: Not on file   Tobacco Use   • Smoking status: Current Every Day Smoker     Packs/day: 1.00     Types: Cigarettes     Start date: 10/5/1976   • Smokeless tobacco: Never Used   Substance and Sexual Activity   • Alcohol use: No     Frequency: Never     Comment: occ   • Drug use: No   • Sexual activity: Defer       Allergies:  Allergies   Allergen Reactions   • Prednisone Other (See Comments)     Causes muscles to constrict        Immunizations:  Immunization History   Administered Date(s) Administered   • Pneumococcal Polysaccharide (PPSV23) 10/15/2018   • Shingrix 11/15/2018, 03/22/2019   • flucelvax quad pfs =>4 YRS 10/15/2018, 10/21/2019       ROS:  ROS       Objective:         Ht 170.2 cm (67\")   Wt 122 kg (268 lb)   BMI 41.97 kg/m²     Physical Exam  Alert and oriented x3 , no focal neurologic deficits grossly moves all extremities with respect motor function  Morbidly obese on inspection  No obvious cough or shortness of breath, no distress  No peripheral edema obvious  No clubbing or cyanosis obvious  Remainder physical exam unable to take secondary to the visit  In-Office Procedure(s):  Procedures    ASCVD RIsk Score::  The 10-year ASCVD risk score (Vanessa ELIZABETH Jr., et al., 2013) is: 8.7%    Values used to calculate the score:      Age: 54 years      Sex: Male      Is Non- : No      Diabetic: No      Tobacco smoker: Yes      Systolic Blood Pressure: 135 mmHg      Is BP treated: No      HDL Cholesterol: 39 mg/dL      Total Cholesterol: 138 mg/dL    Recent Radiology:  Imaging Results (Most Recent)     None          Lab Review:   Orders Only on 11/12/2019   Component Date Value   • Occult Blood 11/12/2019 Positive*   • Expiration Date 11/12/2019 2020-03-31    • Lot Number 1 11/12/2019 [id number]    • Occult Blood 2 11/12/2019 Negative    • Expiration Date 2 " 11/12/2019 2020-09-30    • Lot Number 2 11/12/2019 [id number]    • Occult Blood 3 11/12/2019 Negative    • Expiration Date 3 11/12/2019 2020-09-30    • Lot Number 3 11/12/2019 [id number]    Office Visit on 10/21/2019   Component Date Value   • WBC 10/23/2019 9.25    • RBC 10/23/2019 5.03    • Hemoglobin 10/23/2019 11.4*   • Hematocrit 10/23/2019 38.3    • MCV 10/23/2019 76.1*   • MCH 10/23/2019 22.7*   • MCHC 10/23/2019 29.8*   • RDW 10/23/2019 15.8*   • Platelets 10/23/2019 260    • Glucose 10/23/2019 90    • BUN 10/23/2019 13    • Creatinine 10/23/2019 0.92    • eGFR Non African Am 10/23/2019 86    • eGFR  Am 10/23/2019 104    • BUN/Creatinine Ratio 10/23/2019 14.1    • Sodium 10/23/2019 142    • Potassium 10/23/2019 4.5    • Chloride 10/23/2019 104    • Total CO2 10/23/2019 25.3    • Calcium 10/23/2019 9.0    • Total Protein 10/23/2019 7.0    • Albumin 10/23/2019 4.20    • Globulin 10/23/2019 2.8    • A/G Ratio 10/23/2019 1.5    • Total Bilirubin 10/23/2019 0.3    • Alkaline Phosphatase 10/23/2019 63    • AST (SGOT) 10/23/2019 14    • ALT (SGPT) 10/23/2019 21    • TSH 10/23/2019 1.450    • Total Cholesterol 10/23/2019 138    • Triglycerides 10/23/2019 111    • HDL Cholesterol 10/23/2019 39*   • VLDL Cholesterol 10/23/2019 22.2    • LDL Cholesterol  10/23/2019 77    • Magnesium 10/23/2019 2.0    • Neutrophil Rel % 10/23/2019 CANCELED    • Differential Comment 10/23/2019 Comment    • Comment 10/23/2019 Comment    • Plt Comment 10/23/2019 Comment    • Vitamin B-12 10/23/2019 1,058*   • Folate 10/23/2019 6.87    • Iron 10/23/2019 18*   • Ferritin 10/23/2019 4.43*   • Please note 10/23/2019 Comment    • Written Authorization 10/23/2019 Comment         Labs reviewed on the above  Resting heart rate 50s to 70s per the patient on beta palpation        Assessment:       Palpitations  Hyperlipidemia       Plan:      Palpitations, abnormal EKG  Defer 2D echo as well as 2-week Holter monitor for 30 days  No high risk features  of unexplained syncope or chest pain or dyspnea exertion  Last years EKG reviewed with sinus rhythm with likely incomplete right bundle branch block  Should have repeat EKG  Should have 2D echo as well with abnormal EKG, with obesity could evaluate for pulmonary hypertension  Advise twice daily blood pressure logs to make sure his blood pressure less than 140 systolic at home    Hyperlipidemia  On lipid therapy with target LDL  Unable calculate ASCVD risk activation presently without accurate vitals  Iron deficiency anemia continue on replacement  No indication for ischemic evaluation presently without anginal symptoms or heart failure signs or symptoms or significant dyspnea exertion  Advised diet and exercise per AHA guidelines  Social distancing with community viral infection ongoing  Primary physician goals for CAD and comorbidities  Obesity diet weight loss and caloric restriction    New patient to me with new problems above    We will recheck in 30 days for reassessment of symptoms and indications to move forward noninvasive testing    With the pleasure of involved in his cardiac care.  Please call for questions or concerns  Noah VELAZCO PhD    25-minute spent face-to-face on video call with 25 minutes of charting and record review additionally for total 50 minutes encounter         Shola Zamora MD  04/08/20  .

## 2020-04-21 DIAGNOSIS — G89.29 CHRONIC BILATERAL LOW BACK PAIN WITHOUT SCIATICA: ICD-10-CM

## 2020-04-21 DIAGNOSIS — G56.03 BILATERAL CARPAL TUNNEL SYNDROME: ICD-10-CM

## 2020-04-21 DIAGNOSIS — M54.50 CHRONIC BILATERAL LOW BACK PAIN WITHOUT SCIATICA: ICD-10-CM

## 2020-04-22 RX ORDER — GABAPENTIN 300 MG/1
CAPSULE ORAL
Qty: 180 CAPSULE | Refills: 0 | Status: SHIPPED | OUTPATIENT
Start: 2020-04-22 | End: 2020-07-16 | Stop reason: SDUPTHER

## 2020-05-03 DIAGNOSIS — M15.9 PRIMARY OSTEOARTHRITIS INVOLVING MULTIPLE JOINTS: ICD-10-CM

## 2020-05-03 DIAGNOSIS — M54.50 CHRONIC BILATERAL LOW BACK PAIN WITHOUT SCIATICA: ICD-10-CM

## 2020-05-03 DIAGNOSIS — G89.29 CHRONIC BILATERAL LOW BACK PAIN WITHOUT SCIATICA: ICD-10-CM

## 2020-05-04 RX ORDER — MELOXICAM 15 MG/1
TABLET ORAL
Qty: 30 TABLET | Refills: 0 | Status: SHIPPED | OUTPATIENT
Start: 2020-05-04 | End: 2020-05-29

## 2020-05-19 ENCOUNTER — HOSPITAL ENCOUNTER (OUTPATIENT)
Dept: CARDIOLOGY | Facility: HOSPITAL | Age: 55
Discharge: HOME | End: 2020-05-19
Admitting: INTERNAL MEDICINE

## 2020-05-19 VITALS
WEIGHT: 268 LBS | BODY MASS INDEX: 42.06 KG/M2 | DIASTOLIC BLOOD PRESSURE: 85 MMHG | HEIGHT: 67 IN | SYSTOLIC BLOOD PRESSURE: 134 MMHG | HEART RATE: 61 BPM

## 2020-05-19 DIAGNOSIS — R00.2 PALPITATIONS: ICD-10-CM

## 2020-05-19 PROCEDURE — 93306 TTE W/DOPPLER COMPLETE: CPT | Performed by: INTERNAL MEDICINE

## 2020-05-19 PROCEDURE — 93306 TTE W/DOPPLER COMPLETE: CPT

## 2020-05-20 LAB
AORTIC DIMENSIONLESS INDEX: 0.9 (DI)
BH CV ECHO MEAS - ACS: 2.6 CM
BH CV ECHO MEAS - AO MAX PG (FULL): 2.7 MMHG
BH CV ECHO MEAS - AO MAX PG: 8.1 MMHG
BH CV ECHO MEAS - AO MEAN PG (FULL): 1.1 MMHG
BH CV ECHO MEAS - AO MEAN PG: 4.3 MMHG
BH CV ECHO MEAS - AO ROOT AREA (BSA CORRECTED): 1.6
BH CV ECHO MEAS - AO ROOT AREA: 10.5 CM^2
BH CV ECHO MEAS - AO ROOT DIAM: 3.6 CM
BH CV ECHO MEAS - AO V2 MAX: 142.5 CM/SEC
BH CV ECHO MEAS - AO V2 MEAN: 95.7 CM/SEC
BH CV ECHO MEAS - AO V2 VTI: 30.3 CM
BH CV ECHO MEAS - AVA(I,A): 3.5 CM^2
BH CV ECHO MEAS - AVA(I,D): 3.5 CM^2
BH CV ECHO MEAS - AVA(V,A): 3.3 CM^2
BH CV ECHO MEAS - AVA(V,D): 3.3 CM^2
BH CV ECHO MEAS - BSA(HAYCOCK): 2.5 M^2
BH CV ECHO MEAS - BSA: 2.3 M^2
BH CV ECHO MEAS - BZI_BMI: 42 KILOGRAMS/M^2
BH CV ECHO MEAS - BZI_METRIC_HEIGHT: 170.2 CM
BH CV ECHO MEAS - BZI_METRIC_WEIGHT: 121.6 KG
BH CV ECHO MEAS - EDV(CUBED): 224.5 ML
BH CV ECHO MEAS - EDV(MOD-SP2): 119 ML
BH CV ECHO MEAS - EDV(MOD-SP4): 122 ML
BH CV ECHO MEAS - EDV(TEICH): 185.4 ML
BH CV ECHO MEAS - EF(CUBED): 78.4 %
BH CV ECHO MEAS - EF(MOD-BP): 63 %
BH CV ECHO MEAS - EF(MOD-SP2): 63.9 %
BH CV ECHO MEAS - EF(MOD-SP4): 60.7 %
BH CV ECHO MEAS - EF(TEICH): 69.7 %
BH CV ECHO MEAS - ESV(CUBED): 48.6 ML
BH CV ECHO MEAS - ESV(MOD-SP2): 43 ML
BH CV ECHO MEAS - ESV(MOD-SP4): 48 ML
BH CV ECHO MEAS - ESV(TEICH): 56.2 ML
BH CV ECHO MEAS - FS: 40 %
BH CV ECHO MEAS - IVS/LVPW: 0.86
BH CV ECHO MEAS - IVSD: 0.89 CM
BH CV ECHO MEAS - LAT PEAK E' VEL: 11.6 CM/SEC
BH CV ECHO MEAS - LV DIASTOLIC VOL/BSA (35-75): 53.3 ML/M^2
BH CV ECHO MEAS - LV MASS(C)D: 240.8 GRAMS
BH CV ECHO MEAS - LV MASS(C)DI: 105.1 GRAMS/M^2
BH CV ECHO MEAS - LV MAX PG: 5.4 MMHG
BH CV ECHO MEAS - LV MEAN PG: 3.1 MMHG
BH CV ECHO MEAS - LV SYSTOLIC VOL/BSA (12-30): 21 ML/M^2
BH CV ECHO MEAS - LV V1 MAX: 116.4 CM/SEC
BH CV ECHO MEAS - LV V1 MEAN: 82.3 CM/SEC
BH CV ECHO MEAS - LV V1 VTI: 26.5 CM
BH CV ECHO MEAS - LVIDD: 6.1 CM
BH CV ECHO MEAS - LVIDS: 3.6 CM
BH CV ECHO MEAS - LVLD AP2: 8.5 CM
BH CV ECHO MEAS - LVLD AP4: 8.2 CM
BH CV ECHO MEAS - LVLS AP2: 7.2 CM
BH CV ECHO MEAS - LVLS AP4: 7 CM
BH CV ECHO MEAS - LVOT AREA (M): 4.2 CM^2
BH CV ECHO MEAS - LVOT AREA: 4 CM^2
BH CV ECHO MEAS - LVOT DIAM: 2.3 CM
BH CV ECHO MEAS - LVPWD: 1 CM
BH CV ECHO MEAS - MED PEAK E' VEL: 10.1 CM/SEC
BH CV ECHO MEAS - MR MAX PG: 37.6 MMHG
BH CV ECHO MEAS - MR MAX VEL: 306.8 CM/SEC
BH CV ECHO MEAS - MV A DUR: 0.13 SEC
BH CV ECHO MEAS - MV A MAX VEL: 96.5 CM/SEC
BH CV ECHO MEAS - MV DEC SLOPE: 493.8 CM/SEC^2
BH CV ECHO MEAS - MV DEC TIME: 211 SEC
BH CV ECHO MEAS - MV E MAX VEL: 115 CM/SEC
BH CV ECHO MEAS - MV E/A: 1.2
BH CV ECHO MEAS - MV MAX PG: 8.6 MMHG
BH CV ECHO MEAS - MV MEAN PG: 2.1 MMHG
BH CV ECHO MEAS - MV P1/2T MAX VEL: 138.5 CM/SEC
BH CV ECHO MEAS - MV P1/2T: 82.1 MSEC
BH CV ECHO MEAS - MV V2 MAX: 146.6 CM/SEC
BH CV ECHO MEAS - MV V2 MEAN: 63.2 CM/SEC
BH CV ECHO MEAS - MV V2 VTI: 43.7 CM
BH CV ECHO MEAS - MVA P1/2T LCG: 1.6 CM^2
BH CV ECHO MEAS - MVA(P1/2T): 2.7 CM^2
BH CV ECHO MEAS - MVA(VTI): 2.4 CM^2
BH CV ECHO MEAS - PA ACC TIME: 0.09 SEC
BH CV ECHO MEAS - PA MAX PG (FULL): 2.7 MMHG
BH CV ECHO MEAS - PA MAX PG: 5.7 MMHG
BH CV ECHO MEAS - PA PR(ACCEL): 39.8 MMHG
BH CV ECHO MEAS - PA V2 MAX: 119.4 CM/SEC
BH CV ECHO MEAS - PULM A REVS DUR: 0.14 SEC
BH CV ECHO MEAS - PULM A REVS VEL: 29.3 CM/SEC
BH CV ECHO MEAS - PULM DIAS VEL: 31.3 CM/SEC
BH CV ECHO MEAS - PULM S/D: 1.4
BH CV ECHO MEAS - PULM SYS VEL: 45.4 CM/SEC
BH CV ECHO MEAS - RV BASE (<4.1) - OBSOLETE: 3.3 CM
BH CV ECHO MEAS - RV LENGTH (<8.5) - OBSOLETE: 6.3 CM
BH CV ECHO MEAS - RV MAX PG: 3 MMHG
BH CV ECHO MEAS - RV MEAN PG: 1.8 MMHG
BH CV ECHO MEAS - RV V1 MAX: 86.3 CM/SEC
BH CV ECHO MEAS - RV V1 MEAN: 62.5 CM/SEC
BH CV ECHO MEAS - RV V1 VTI: 21.6 CM
BH CV ECHO MEAS - SI(AO): 138.5 ML/M^2
BH CV ECHO MEAS - SI(CUBED): 76.8 ML/M^2
BH CV ECHO MEAS - SI(LVOT): 46.2 ML/M^2
BH CV ECHO MEAS - SI(MOD-SP2): 33.2 ML/M^2
BH CV ECHO MEAS - SI(MOD-SP4): 32.3 ML/M^2
BH CV ECHO MEAS - SI(TEICH): 56.4 ML/M^2
BH CV ECHO MEAS - SV(AO): 317.1 ML
BH CV ECHO MEAS - SV(CUBED): 175.9 ML
BH CV ECHO MEAS - SV(LVOT): 105.9 ML
BH CV ECHO MEAS - SV(MOD-SP2): 76 ML
BH CV ECHO MEAS - SV(MOD-SP4): 74 ML
BH CV ECHO MEAS - SV(TEICH): 129.2 ML
BH CV ECHO MEAS - TAPSE (>1.6): 3 CM2
BH CV ECHO MEASUREMENTS AVERAGE E/E' RATIO: 10.6
BH CV XLRA - RV BASE: 3.3 CM
BH CV XLRA - RV MID: 2.9 CM
BH CV XLRA - TDI S': 15.4 CM/SEC
LEFT ATRIUM VOLUME INDEX: 24.6 ML/M2
MAXIMAL PREDICTED HEART RATE: 166 BPM
STRESS TARGET HR: 141 BPM

## 2020-05-21 ENCOUNTER — TELEPHONE (OUTPATIENT)
Dept: CARDIOLOGY | Facility: CLINIC | Age: 55
End: 2020-05-21

## 2020-05-29 DIAGNOSIS — M54.50 CHRONIC BILATERAL LOW BACK PAIN WITHOUT SCIATICA: ICD-10-CM

## 2020-05-29 DIAGNOSIS — M15.9 PRIMARY OSTEOARTHRITIS INVOLVING MULTIPLE JOINTS: ICD-10-CM

## 2020-05-29 DIAGNOSIS — G89.29 CHRONIC BILATERAL LOW BACK PAIN WITHOUT SCIATICA: ICD-10-CM

## 2020-05-29 RX ORDER — MELOXICAM 15 MG/1
15 TABLET ORAL DAILY
Qty: 30 TABLET | Refills: 0 | Status: SHIPPED | OUTPATIENT
Start: 2020-05-29 | End: 2020-07-16 | Stop reason: SDUPTHER

## 2020-07-16 ENCOUNTER — OFFICE VISIT (OUTPATIENT)
Dept: FAMILY MEDICINE CLINIC | Facility: CLINIC | Age: 55
End: 2020-07-16

## 2020-07-16 VITALS
WEIGHT: 277 LBS | OXYGEN SATURATION: 96 % | DIASTOLIC BLOOD PRESSURE: 78 MMHG | HEIGHT: 67 IN | HEART RATE: 65 BPM | BODY MASS INDEX: 43.47 KG/M2 | SYSTOLIC BLOOD PRESSURE: 128 MMHG

## 2020-07-16 DIAGNOSIS — E78.2 MIXED HYPERLIPIDEMIA: ICD-10-CM

## 2020-07-16 DIAGNOSIS — G89.29 CHRONIC BILATERAL LOW BACK PAIN WITHOUT SCIATICA: ICD-10-CM

## 2020-07-16 DIAGNOSIS — M15.9 PRIMARY OSTEOARTHRITIS INVOLVING MULTIPLE JOINTS: ICD-10-CM

## 2020-07-16 DIAGNOSIS — D50.0 IRON DEFICIENCY ANEMIA DUE TO CHRONIC BLOOD LOSS: ICD-10-CM

## 2020-07-16 DIAGNOSIS — M54.50 CHRONIC BILATERAL LOW BACK PAIN WITHOUT SCIATICA: ICD-10-CM

## 2020-07-16 DIAGNOSIS — G56.03 BILATERAL CARPAL TUNNEL SYNDROME: ICD-10-CM

## 2020-07-16 DIAGNOSIS — E66.01 OBESITY, MORBID, BMI 40.0-49.9 (HCC): ICD-10-CM

## 2020-07-16 DIAGNOSIS — Z00.00 WELLNESS EXAMINATION: Primary | ICD-10-CM

## 2020-07-16 PROCEDURE — 99396 PREV VISIT EST AGE 40-64: CPT | Performed by: FAMILY MEDICINE

## 2020-07-16 RX ORDER — MELOXICAM 15 MG/1
15 TABLET ORAL DAILY
Qty: 30 TABLET | Refills: 5 | Status: SHIPPED | OUTPATIENT
Start: 2020-07-16 | End: 2021-01-01 | Stop reason: SDUPTHER

## 2020-07-16 RX ORDER — GABAPENTIN 300 MG/1
300 CAPSULE ORAL 2 TIMES DAILY
Qty: 180 CAPSULE | Refills: 1 | Status: SHIPPED | OUTPATIENT
Start: 2020-07-16 | End: 2020-10-30 | Stop reason: SDUPTHER

## 2020-07-16 NOTE — PROGRESS NOTES
Subjective   Miles Munoz is a 54 y.o. male. Presents today for   Chief Complaint   Patient presents with   • Hyperlipidemia   • Anxiety   • Depression   • Annual Exam     wellness       History of Present Illness  Patient here for annual wellness exam;  Reports stepped down as  and palpitations greatly improved.  Was also drinking 12 mountain dew bottles a day.  Did see cardiology awaiting event monitor results;  No cp;  Doing ok with medicatiosn;  Due for artrhtisi med, reports no stomach issues on, has to be cautious as hx of gastric ulcers;      Review of Systems   Respiratory: Negative for shortness of breath.    Cardiovascular: Negative for chest pain.   Gastrointestinal: Negative for abdominal pain, nausea and vomiting.       Patient Active Problem List   Diagnosis   • Occult blood positive stool   • Gastroesophageal reflux disease   • Tubulovillous adenoma of colon   • Multiple gastric ulcers   • Snores   • Witnessed apneic spells   • Obesity, morbid, BMI 40.0-49.9 (CMS/[facility])   • Sleep-related bruxism   • MARIA A on CPAP   • Hypersomnia with sleep apnea   • Sleep related hypoxia   • Hyperlipidemia   • Palpitations       Social History     Socioeconomic History   • Marital status:      Spouse name: Not on file   • Number of children: Not on file   • Years of education: Not on file   • Highest education level: Not on file   Tobacco Use   • Smoking status: Current Every Day Smoker     Packs/day: 1.00     Types: Cigarettes     Start date: 10/5/1976   • Smokeless tobacco: Never Used   Substance and Sexual Activity   • Alcohol use: No     Frequency: Never     Comment: occ   • Drug use: No   • Sexual activity: Defer       Allergies   Allergen Reactions   • Prednisone Other (See Comments)     Causes muscles to constrict        Current Outpatient Medications on File Prior to Visit   Medication Sig Dispense Refill   • acetaminophen (TYLENOL) 325 MG tablet Take 650 mg by mouth Every 6 (Six) Hours As Needed  "for Mild Pain .     • atorvastatin (LIPITOR) 20 MG tablet Take 1 tablet by mouth Daily. 90 tablet 1   • buPROPion SR (WELLBUTRIN SR) 150 MG 12 hr tablet Take 1 tablet by mouth Every 12 (Twelve) Hours. 180 tablet 0   • cetirizine (zyrTEC) 10 MG tablet Take 10 mg by mouth Every Morning.     • ferrous gluconate (FERGON) 324 MG tablet TAKE 1 TABLET BY MOUTH TWICE DAILY WITH MEALS 60 tablet 5   • gabapentin (NEURONTIN) 300 MG capsule Take 1 capsule by mouth twice daily 180 capsule 0   • HYDROcodone-acetaminophen (NORCO) 5-325 MG per tablet Take 1-2 tablets by mouth Every 4 (Four) Hours As Needed (Pain). 30 tablet 0   • meloxicam (MOBIC) 15 MG tablet Take 1 tablet by mouth Daily. PLEASE CALL THE OFFICE FOR AN APPT 30 tablet 0   • omeprazole (priLOSEC) 40 MG capsule Take 1 capsule by mouth Daily. (Patient taking differently: Take 20 mg by mouth Every Morning.) 90 capsule 1   • sildenafil (VIAGRA) 100 MG tablet Take 100 mg by mouth Daily As Needed for erectile dysfunction. Instructed to withhold med 48 hrs prior to sx     • tiZANidine (ZANAFLEX) 4 MG tablet Take 1 tablet by mouth At Night As Needed for Muscle Spasms. 30 tablet 5     No current facility-administered medications on file prior to visit.        Objective   Vitals:    07/16/20 1001   BP: 128/78   BP Location: Left arm   Patient Position: Sitting   Cuff Size: Adult   Pulse: 65   SpO2: 96%   Weight: 126 kg (277 lb)   Height: 170.2 cm (67\")     Body mass index is 43.38 kg/m².    Physical Exam   Constitutional: He appears well-developed and well-nourished.   HENT:   Head: Normocephalic and atraumatic.   Neck: Neck supple. No JVD present. No thyromegaly present.   Cardiovascular: Normal rate, regular rhythm and normal heart sounds. Exam reveals no gallop and no friction rub.   No murmur heard.  Pulmonary/Chest: Effort normal and breath sounds normal. No respiratory distress. He has no wheezes. He has no rales.   Abdominal: Soft. Bowel sounds are normal. He exhibits " no distension. There is no tenderness. There is no rebound and no guarding.   Musculoskeletal: He exhibits no edema.   Neurological: He is alert.   Skin: Skin is warm and dry.   Psychiatric: He has a normal mood and affect. His behavior is normal.   Nursing note and vitals reviewed.      Assessment/Plan   Miles was seen today for hyperlipidemia, anxiety, depression and annual exam.    Diagnoses and all orders for this visit:    Wellness examination    Mixed hyperlipidemia  -     Comprehensive Metabolic Panel  -     Lipid Panel    Chronic bilateral low back pain without sciatica  -     meloxicam (MOBIC) 15 MG tablet; Take 1 tablet by mouth Daily.    Primary osteoarthritis involving multiple joints  -     meloxicam (MOBIC) 15 MG tablet; Take 1 tablet by mouth Daily.    Obesity, morbid, BMI 40.0-49.9 (CMS/HCC)    Iron deficiency anemia due to chronic blood loss  -     CBC & Differential  -     Ferritin  -     Iron Profile    counseled on diet and exercise  Discussed with long-term use of nsaids and concerns for renal, gi and cardiovascular risks.  Recommend use only prn and monitor renal function.         -Follow up: 6 months and prn

## 2020-07-16 NOTE — PATIENT INSTRUCTIONS
Calorie Counting for Weight Loss  Calories are units of energy. Your body needs a certain amount of calories from food to keep you going throughout the day. When you eat more calories than your body needs, your body stores the extra calories as fat. When you eat fewer calories than your body needs, your body burns fat to get the energy it needs.  Calorie counting means keeping track of how many calories you eat and drink each day. Calorie counting can be helpful if you need to lose weight. If you make sure to eat fewer calories than your body needs, you should lose weight. Ask your health care provider what a healthy weight is for you.  For calorie counting to work, you will need to eat the right number of calories in a day in order to lose a healthy amount of weight per week. A dietitian can help you determine how many calories you need in a day and will give you suggestions on how to reach your calorie goal.  · A healthy amount of weight to lose per week is usually 1-2 lb (0.5-0.9 kg). This usually means that your daily calorie intake should be reduced by 500-750 calories.  · Eating 1,200 - 1,500 calories per day can help most women lose weight.  · Eating 1,500 - 1,800 calories per day can help most men lose weight.  What is my plan?  My goal is to have __________ calories per day.  If I have this many calories per day, I should lose around __________ pounds per week.  What do I need to know about calorie counting?  In order to meet your daily calorie goal, you will need to:  · Find out how many calories are in each food you would like to eat. Try to do this before you eat.  · Decide how much of the food you plan to eat.  · Write down what you ate and how many calories it had. Doing this is called keeping a food log.  To successfully lose weight, it is important to balance calorie counting with a healthy lifestyle that includes regular activity. Aim for 150 minutes of moderate exercise (such as walking) or 75  minutes of vigorous exercise (such as running) each week.  Where do I find calorie information?    The number of calories in a food can be found on a Nutrition Facts label. If a food does not have a Nutrition Facts label, try to look up the calories online or ask your dietitian for help.  Remember that calories are listed per serving. If you choose to have more than one serving of a food, you will have to multiply the calories per serving by the amount of servings you plan to eat. For example, the label on a package of bread might say that a serving size is 1 slice and that there are 90 calories in a serving. If you eat 1 slice, you will have eaten 90 calories. If you eat 2 slices, you will have eaten 180 calories.  How do I keep a food log?  Immediately after each meal, record the following information in your food log:  · What you ate. Don't forget to include toppings, sauces, and other extras on the food.  · How much you ate. This can be measured in cups, ounces, or number of items.  · How many calories each food and drink had.  · The total number of calories in the meal.  Keep your food log near you, such as in a small notebook in your pocket, or use a mobile martin or website. Some programs will calculate calories for you and show you how many calories you have left for the day to meet your goal.  What are some calorie counting tips?    · Use your calories on foods and drinks that will fill you up and not leave you hungry:  ? Some examples of foods that fill you up are nuts and nut butters, vegetables, lean proteins, and high-fiber foods like whole grains. High-fiber foods are foods with more than 5 g fiber per serving.  ? Drinks such as sodas, specialty coffee drinks, alcohol, and juices have a lot of calories, yet do not fill you up.  · Eat nutritious foods and avoid empty calories. Empty calories are calories you get from foods or beverages that do not have many vitamins or protein, such as candy, sweets, and  "soda. It is better to have a nutritious high-calorie food (such as an avocado) than a food with few nutrients (such as a bag of chips).  · Know how many calories are in the foods you eat most often. This will help you calculate calorie counts faster.  · Pay attention to calories in drinks. Low-calorie drinks include water and unsweetened drinks.  · Pay attention to nutrition labels for \"low fat\" or \"fat free\" foods. These foods sometimes have the same amount of calories or more calories than the full fat versions. They also often have added sugar, starch, or salt, to make up for flavor that was removed with the fat.  · Find a way of tracking calories that works for you. Get creative. Try different apps or programs if writing down calories does not work for you.  What are some portion control tips?  · Know how many calories are in a serving. This will help you know how many servings of a certain food you can have.  · Use a measuring cup to measure serving sizes. You could also try weighing out portions on a kitchen scale. With time, you will be able to estimate serving sizes for some foods.  · Take some time to put servings of different foods on your favorite plates, bowls, and cups so you know what a serving looks like.  · Try not to eat straight from a bag or box. Doing this can lead to overeating. Put the amount you would like to eat in a cup or on a plate to make sure you are eating the right portion.  · Use smaller plates, glasses, and bowls to prevent overeating.  · Try not to multitask (for example, watch TV or use your computer) while eating. If it is time to eat, sit down at a table and enjoy your food. This will help you to know when you are full. It will also help you to be aware of what you are eating and how much you are eating.  What are tips for following this plan?  Reading food labels  · Check the calorie count compared to the serving size. The serving size may be smaller than what you are used to " "eating.  · Check the source of the calories. Make sure the food you are eating is high in vitamins and protein and low in saturated and trans fats.  Shopping  · Read nutrition labels while you shop. This will help you make healthy decisions before you decide to purchase your food.  · Make a grocery list and stick to it.  Cooking  · Try to cook your favorite foods in a healthier way. For example, try baking instead of frying.  · Use low-fat dairy products.  Meal planning  · Use more fruits and vegetables. Half of your plate should be fruits and vegetables.  · Include lean proteins like poultry and fish.  How do I count calories when eating out?  · Ask for smaller portion sizes.  · Consider sharing an entree and sides instead of getting your own entree.  · If you get your own entree, eat only half. Ask for a box at the beginning of your meal and put the rest of your entree in it so you are not tempted to eat it.  · If calories are listed on the menu, choose the lower calorie options.  · Choose dishes that include vegetables, fruits, whole grains, low-fat dairy products, and lean protein.  · Choose items that are boiled, broiled, grilled, or steamed. Stay away from items that are buttered, battered, fried, or served with cream sauce. Items labeled \"crispy\" are usually fried, unless stated otherwise.  · Choose water, low-fat milk, unsweetened iced tea, or other drinks without added sugar. If you want an alcoholic beverage, choose a lower calorie option such as a glass of wine or light beer.  · Ask for dressings, sauces, and syrups on the side. These are usually high in calories, so you should limit the amount you eat.  · If you want a salad, choose a garden salad and ask for grilled meats. Avoid extra toppings like shankar, cheese, or fried items. Ask for the dressing on the side, or ask for olive oil and vinegar or lemon to use as dressing.  · Estimate how many servings of a food you are given. For example, a serving of " cooked rice is ½ cup or about the size of half a baseball. Knowing serving sizes will help you be aware of how much food you are eating at restaurants. The list below tells you how big or small some common portion sizes are based on everyday objects:  ? 1 oz--4 stacked dice.  ? 3 oz--1 deck of cards.  ? 1 tsp--1 die.  ? 1 Tbsp--½ a ping-pong ball.  ? 2 Tbsp--1 ping-pong ball.  ? ½ cup--½ baseball.  ? 1 cup--1 baseball.  Summary  · Calorie counting means keeping track of how many calories you eat and drink each day. If you eat fewer calories than your body needs, you should lose weight.  · A healthy amount of weight to lose per week is usually 1-2 lb (0.5-0.9 kg). This usually means reducing your daily calorie intake by 500-750 calories.  · The number of calories in a food can be found on a Nutrition Facts label. If a food does not have a Nutrition Facts label, try to look up the calories online or ask your dietitian for help.  · Use your calories on foods and drinks that will fill you up, and not on foods and drinks that will leave you hungry.  · Use smaller plates, glasses, and bowls to prevent overeating.  This information is not intended to replace advice given to you by your health care provider. Make sure you discuss any questions you have with your health care provider.  Document Released: 12/18/2006 Document Revised: 09/06/2019 Document Reviewed: 11/17/2017  [employer] Patient Education © 2020 [employer]      Exercising to Lose Weight  Exercise is structured, repetitive physical activity to improve fitness and health. Getting regular exercise is important for everyone. It is especially important if you are overweight. Being overweight increases your risk of heart disease, stroke, diabetes, high blood pressure, and several types of cancer. Reducing your calorie intake and exercising can help you lose weight.  Exercise is usually categorized as moderate or vigorous intensity. To lose weight, most people need  to do a certain amount of moderate-intensity or vigorous-intensity exercise each week.  Moderate-intensity exercise    Moderate-intensity exercise is any activity that gets you moving enough to burn at least three times more energy (calories) than if you were sitting.  Examples of moderate exercise include:  · Walking a mile in 15 minutes.  · Doing light yard work.  · Biking at an easy pace.  Most people should get at least 150 minutes (2 hours and 30 minutes) a week of moderate-intensity exercise to maintain their body weight.  Vigorous-intensity exercise  Vigorous-intensity exercise is any activity that gets you moving enough to burn at least six times more calories than if you were sitting. When you exercise at this intensity, you should be working hard enough that you are not able to carry on a conversation.  Examples of vigorous exercise include:  · Running.  · Playing a team sport, such as football, basketball, and soccer.  · Jumping rope.  Most people should get at least 75 minutes (1 hour and 15 minutes) a week of vigorous-intensity exercise to maintain their body weight.  How can exercise affect me?  When you exercise enough to burn more calories than you eat, you lose weight. Exercise also reduces body fat and builds muscle. The more muscle you have, the more calories you burn. Exercise also:  · Improves mood.  · Reduces stress and tension.  · Improves your overall fitness, flexibility, and endurance.  · Increases bone strength.  The amount of exercise you need to lose weight depends on:  · Your age.  · The type of exercise.  · Any health conditions you have.  · Your overall physical ability.  Talk to your health care provider about how much exercise you need and what types of activities are safe for you.  What actions can I take to lose weight?  Nutrition    · Make changes to your diet as told by your health care provider or diet and nutrition specialist (dietitian). This may include:  ? Eating fewer  calories.  ? Eating more protein.  ? Eating less unhealthy fats.  ? Eating a diet that includes fresh fruits and vegetables, whole grains, low-fat dairy products, and lean protein.  ? Avoiding foods with added fat, salt, and sugar.  · Drink plenty of water while you exercise to prevent dehydration or heat stroke.  Activity  · Choose an activity that you enjoy and set realistic goals. Your health care provider can help you make an exercise plan that works for you.  · Exercise at a moderate or vigorous intensity most days of the week.  ? The intensity of exercise may vary from person to person. You can tell how intense a workout is for you by paying attention to your breathing and heartbeat. Most people will notice their breathing and heartbeat get faster with more intense exercise.  · Do resistance training twice each week, such as:  ? Push-ups.  ? Sit-ups.  ? Lifting weights.  ? Using resistance bands.  · Getting short amounts of exercise can be just as helpful as long structured periods of exercise. If you have trouble finding time to exercise, try to include exercise in your daily routine.  ? Get up, stretch, and walk around every 30 minutes throughout the day.  ? Go for a walk during your lunch break.  ? Park your car farther away from your destination.  ? If you take public transportation, get off one stop early and walk the rest of the way.  ? Make phone calls while standing up and walking around.  ? Take the stairs instead of elevators or escalators.  · Wear comfortable clothes and shoes with good support.  · Do not exercise so much that you hurt yourself, feel dizzy, or get very short of breath.  Where to find more information  · [place] Department of Health and Human Services: www.hhs.gov  · Centers for Disease Control and Prevention (CDC): www.cdc.gov  Contact a health care provider:  · Before starting a new exercise program.  · If you have questions or concerns about your weight.  · If you have a medical  problem that keeps you from exercising.  Get help right away if you have any of the following while exercising:  · Injury.  · Dizziness.  · Difficulty breathing or shortness of breath that does not go away when you stop exercising.  · Chest pain.  · Rapid heartbeat.  Summary  · Being overweight increases your risk of heart disease, stroke, diabetes, high blood pressure, and several types of cancer.  · Losing weight happens when you burn more calories than you eat.  · Reducing the amount of calories you eat in addition to getting regular moderate or vigorous exercise each week helps you lose weight.  This information is not intended to replace advice given to you by your health care provider. Make sure you discuss any questions you have with your health care provider.  Document Released: 01/20/2012 Document Revised: 12/31/2018 Document Reviewed: 12/31/2018  [employer] Patient Education © 2020 [employer]

## 2020-07-17 LAB
ALBUMIN SERPL-MCNC: 4.1 G/DL (ref 3.5–5.2)
ALBUMIN/GLOB SERPL: 1.7 G/DL
ALP SERPL-CCNC: 60 U/L (ref 39–117)
ALT SERPL-CCNC: 27 U/L (ref 1–41)
AST SERPL-CCNC: 17 U/L (ref 1–40)
BASOPHILS # BLD AUTO: 0.09 10*3/MM3 (ref 0–0.2)
BASOPHILS NFR BLD AUTO: 1.4 % (ref 0–1.5)
BILIRUB SERPL-MCNC: 0.8 MG/DL (ref 0–1.2)
BUN SERPL-MCNC: 13 MG/DL (ref 6–20)
BUN/CREAT SERPL: 15.5 (ref 7–25)
CALCIUM SERPL-MCNC: 8.8 MG/DL (ref 8.6–10.5)
CHLORIDE SERPL-SCNC: 107 MMOL/L (ref 98–107)
CHOLEST SERPL-MCNC: 131 MG/DL (ref 0–200)
CO2 SERPL-SCNC: 26.4 MMOL/L (ref 22–29)
CREAT SERPL-MCNC: 0.84 MG/DL (ref 0.76–1.27)
EOSINOPHIL # BLD AUTO: 0.35 10*3/MM3 (ref 0–0.4)
EOSINOPHIL NFR BLD AUTO: 5.3 % (ref 0.3–6.2)
ERYTHROCYTE [DISTWIDTH] IN BLOOD BY AUTOMATED COUNT: 12.2 % (ref 12.3–15.4)
FERRITIN SERPL-MCNC: 68.1 NG/ML (ref 30–400)
GLOBULIN SER CALC-MCNC: 2.4 GM/DL
GLUCOSE SERPL-MCNC: 94 MG/DL (ref 65–99)
HCT VFR BLD AUTO: 45.5 % (ref 37.5–51)
HDLC SERPL-MCNC: 34 MG/DL (ref 40–60)
HGB BLD-MCNC: 15.9 G/DL (ref 13–17.7)
IMM GRANULOCYTES # BLD AUTO: 0.02 10*3/MM3 (ref 0–0.05)
IMM GRANULOCYTES NFR BLD AUTO: 0.3 % (ref 0–0.5)
IRON SATN MFR SERPL: 28 % (ref 20–50)
IRON SERPL-MCNC: 100 MCG/DL (ref 59–158)
LDLC SERPL CALC-MCNC: 81 MG/DL (ref 0–100)
LYMPHOCYTES # BLD AUTO: 1.91 10*3/MM3 (ref 0.7–3.1)
LYMPHOCYTES NFR BLD AUTO: 28.9 % (ref 19.6–45.3)
MCH RBC QN AUTO: 31.5 PG (ref 26.6–33)
MCHC RBC AUTO-ENTMCNC: 34.9 G/DL (ref 31.5–35.7)
MCV RBC AUTO: 90.1 FL (ref 79–97)
MONOCYTES # BLD AUTO: 0.55 10*3/MM3 (ref 0.1–0.9)
MONOCYTES NFR BLD AUTO: 8.3 % (ref 5–12)
NEUTROPHILS # BLD AUTO: 3.68 10*3/MM3 (ref 1.7–7)
NEUTROPHILS NFR BLD AUTO: 55.8 % (ref 42.7–76)
NRBC BLD AUTO-RTO: 0 /100 WBC (ref 0–0.2)
PLATELET # BLD AUTO: 169 10*3/MM3 (ref 140–450)
POTASSIUM SERPL-SCNC: 4.2 MMOL/L (ref 3.5–5.2)
PROT SERPL-MCNC: 6.5 G/DL (ref 6–8.5)
RBC # BLD AUTO: 5.05 10*6/MM3 (ref 4.14–5.8)
SODIUM SERPL-SCNC: 143 MMOL/L (ref 136–145)
TIBC SERPL-MCNC: 353 MCG/DL
TRIGL SERPL-MCNC: 78 MG/DL (ref 0–150)
UIBC SERPL-MCNC: 253 MCG/DL (ref 112–346)
VLDLC SERPL CALC-MCNC: 15.6 MG/DL
WBC # BLD AUTO: 6.6 10*3/MM3 (ref 3.4–10.8)

## 2020-08-06 RX ORDER — ATORVASTATIN CALCIUM 20 MG/1
TABLET, FILM COATED ORAL
Qty: 90 TABLET | Refills: 1 | Status: SHIPPED | OUTPATIENT
Start: 2020-08-06 | End: 2021-02-22

## 2020-10-30 DIAGNOSIS — G56.03 BILATERAL CARPAL TUNNEL SYNDROME: ICD-10-CM

## 2020-10-30 DIAGNOSIS — M54.50 CHRONIC BILATERAL LOW BACK PAIN WITHOUT SCIATICA: ICD-10-CM

## 2020-10-30 DIAGNOSIS — G89.29 CHRONIC BILATERAL LOW BACK PAIN WITHOUT SCIATICA: ICD-10-CM

## 2020-10-30 RX ORDER — GABAPENTIN 300 MG/1
300 CAPSULE ORAL 2 TIMES DAILY
Qty: 180 CAPSULE | Refills: 1 | Status: SHIPPED | OUTPATIENT
Start: 2020-10-30 | End: 2021-08-03 | Stop reason: SDUPTHER

## 2021-01-01 DIAGNOSIS — M54.50 CHRONIC BILATERAL LOW BACK PAIN WITHOUT SCIATICA: ICD-10-CM

## 2021-01-01 DIAGNOSIS — G89.29 CHRONIC BILATERAL LOW BACK PAIN WITHOUT SCIATICA: ICD-10-CM

## 2021-01-01 DIAGNOSIS — M15.9 PRIMARY OSTEOARTHRITIS INVOLVING MULTIPLE JOINTS: ICD-10-CM

## 2021-01-04 RX ORDER — MELOXICAM 15 MG/1
15 TABLET ORAL DAILY
Qty: 30 TABLET | Refills: 5 | Status: SHIPPED | OUTPATIENT
Start: 2021-01-04 | End: 2021-05-29 | Stop reason: SDUPTHER

## 2021-01-21 ENCOUNTER — OFFICE VISIT (OUTPATIENT)
Dept: FAMILY MEDICINE CLINIC | Facility: CLINIC | Age: 56
End: 2021-01-21

## 2021-01-21 VITALS
BODY MASS INDEX: 43.63 KG/M2 | SYSTOLIC BLOOD PRESSURE: 112 MMHG | OXYGEN SATURATION: 98 % | WEIGHT: 278 LBS | HEART RATE: 62 BPM | HEIGHT: 67 IN | DIASTOLIC BLOOD PRESSURE: 76 MMHG

## 2021-01-21 DIAGNOSIS — D50.0 IRON DEFICIENCY ANEMIA DUE TO CHRONIC BLOOD LOSS: ICD-10-CM

## 2021-01-21 DIAGNOSIS — M54.50 CHRONIC RIGHT-SIDED LOW BACK PAIN WITHOUT SCIATICA: ICD-10-CM

## 2021-01-21 DIAGNOSIS — J20.9 ACUTE BRONCHITIS, UNSPECIFIED ORGANISM: ICD-10-CM

## 2021-01-21 DIAGNOSIS — K63.5 POLYP OF COLON, UNSPECIFIED PART OF COLON, UNSPECIFIED TYPE: ICD-10-CM

## 2021-01-21 DIAGNOSIS — G89.29 CHRONIC RIGHT-SIDED LOW BACK PAIN WITHOUT SCIATICA: ICD-10-CM

## 2021-01-21 DIAGNOSIS — M15.9 PRIMARY OSTEOARTHRITIS INVOLVING MULTIPLE JOINTS: ICD-10-CM

## 2021-01-21 DIAGNOSIS — E78.2 MIXED HYPERLIPIDEMIA: Primary | ICD-10-CM

## 2021-01-21 DIAGNOSIS — Z00.00 WELLNESS EXAMINATION: ICD-10-CM

## 2021-01-21 PROCEDURE — 99214 OFFICE O/P EST MOD 30 MIN: CPT | Performed by: FAMILY MEDICINE

## 2021-01-21 PROCEDURE — 96372 THER/PROPH/DIAG INJ SC/IM: CPT | Performed by: FAMILY MEDICINE

## 2021-01-21 RX ORDER — METHYLPREDNISOLONE ACETATE 80 MG/ML
80 INJECTION, SUSPENSION INTRA-ARTICULAR; INTRALESIONAL; INTRAMUSCULAR; SOFT TISSUE ONCE
Status: COMPLETED | OUTPATIENT
Start: 2021-01-21 | End: 2021-01-21

## 2021-01-21 RX ADMIN — METHYLPREDNISOLONE ACETATE 80 MG: 80 INJECTION, SUSPENSION INTRA-ARTICULAR; INTRALESIONAL; INTRAMUSCULAR; SOFT TISSUE at 09:51

## 2021-01-21 NOTE — PROGRESS NOTES
Subjective   iMles Munoz is a 55 y.o. male. Presents today for   Chief Complaint   Patient presents with   • Hyperlipidemia   • Colonoscopy     needs order   • Arthritis       Hyperlipidemia  This is a chronic problem. The current episode started more than 1 year ago. The problem is controlled. Recent lipid tests were reviewed and are normal. Exacerbating diseases include obesity. Pertinent negatives include no chest pain, focal sensory loss, focal weakness or shortness of breath. Current antihyperlipidemic treatment includes statins. The current treatment provides moderate improvement of lipids. There are no compliance problems.  Risk factors for coronary artery disease include dyslipidemia, male sex and obesity.   Arthritis  Presents for follow-up visit. He complains of pain. The symptoms have been stable. Affected location: multiple joints and back. Pertinent negatives include no fever. (On chronic nsaids) Compliance with total regimen is %. Compliance with medications is %.   Cough  This is a new problem. The current episode started more than 1 month ago. The problem has been waxing and waning. The cough is productive of sputum. Pertinent negatives include no chest pain, chills, fever, hemoptysis, nasal congestion, postnasal drip or shortness of breath. Associated symptoms comments: Has cut back on smoking but not ready to quit yet.  . He has tried OTC cough suppressant (seen end of December negative covid and flu; dx wtih bronchitis;  otc some relief.) for the symptoms. The treatment provided mild (stopped dayquil as giving diarrhea) relief.   Back Pain  This is a chronic problem. The current episode started more than 1 year ago. The problem occurs constantly. The problem is unchanged. The pain is present in the lumbar spine. The quality of the pain is described as aching. The pain is moderate. The symptoms are aggravated by bending and position. Pertinent negatives include no chest pain or  fever. He has tried NSAIDs for the symptoms. The treatment provided mild relief.   GI  Had abnormal polyps, to have c-scope every 3 years and now due.  Will refer back.      Review of Systems   Constitutional: Negative for chills and fever.   HENT: Negative for postnasal drip.    Respiratory: Positive for cough. Negative for hemoptysis and shortness of breath.    Cardiovascular: Negative for chest pain.   Musculoskeletal: Positive for arthritis and back pain.   Neurological: Negative for focal weakness.       Patient Active Problem List   Diagnosis   • Occult blood positive stool   • Gastroesophageal reflux disease   • Tubulovillous adenoma of colon   • Multiple gastric ulcers   • Snores   • Witnessed apneic spells   • Obesity, morbid, BMI 40.0-49.9 (CMS/[facility])   • Sleep-related bruxism   • MARIA A on CPAP   • Hypersomnia with sleep apnea   • Sleep related hypoxia   • Hyperlipidemia   • Palpitations       Social History     Socioeconomic History   • Marital status:      Spouse name: Not on file   • Number of children: Not on file   • Years of education: Not on file   • Highest education level: Not on file   Tobacco Use   • Smoking status: Current Every Day Smoker     Packs/day: 1.00     Types: Cigarettes     Start date: 10/5/1976   • Smokeless tobacco: Never Used   Substance and Sexual Activity   • Alcohol use: No     Frequency: Never     Comment: occ   • Drug use: No   • Sexual activity: Defer       Allergies   Allergen Reactions   • Prednisone Other (See Comments)     Causes muscles to constrict        Current Outpatient Medications on File Prior to Visit   Medication Sig Dispense Refill   • acetaminophen (TYLENOL) 325 MG tablet Take 650 mg by mouth Every 6 (Six) Hours As Needed for Mild Pain .     • atorvastatin (LIPITOR) 20 MG tablet Take 1 tablet by mouth once daily 90 tablet 1   • cetirizine (zyrTEC) 10 MG tablet Take 10 mg by mouth Every Morning.     • ferrous gluconate (FERGON) 324 MG tablet TAKE 1 TABLET  "BY MOUTH TWICE DAILY WITH MEALS 60 tablet 5   • gabapentin (NEURONTIN) 300 MG capsule Take 1 capsule by mouth 2 (Two) Times a Day. 180 capsule 1   • HYDROcodone-acetaminophen (NORCO) 5-325 MG per tablet Take 1-2 tablets by mouth Every 4 (Four) Hours As Needed (Pain). 30 tablet 0   • meloxicam (MOBIC) 15 MG tablet Take 1 tablet by mouth Daily. 30 tablet 5   • omeprazole (priLOSEC) 40 MG capsule Take 1 capsule by mouth Daily. (Patient taking differently: Take 20 mg by mouth Every Morning.) 90 capsule 1   • sildenafil (VIAGRA) 100 MG tablet Take 100 mg by mouth Daily As Needed for erectile dysfunction. Instructed to withhold med 48 hrs prior to sx     • tiZANidine (ZANAFLEX) 4 MG tablet Take 1 tablet by mouth At Night As Needed for Muscle Spasms. 30 tablet 5   • [DISCONTINUED] buPROPion SR (WELLBUTRIN SR) 150 MG 12 hr tablet Take 1 tablet by mouth Every 12 (Twelve) Hours. 180 tablet 0     No current facility-administered medications on file prior to visit.        Objective   Vitals:    01/21/21 0859   BP: 112/76   Pulse: 62   SpO2: 98%   Weight: 126 kg (278 lb)   Height: 170.2 cm (67\")     Body mass index is 43.54 kg/m².    Physical Exam  Vitals signs and nursing note reviewed.   Constitutional:       Appearance: He is well-developed.   HENT:      Head: Normocephalic and atraumatic.   Neck:      Musculoskeletal: Neck supple.      Thyroid: No thyromegaly.      Vascular: No JVD.   Cardiovascular:      Rate and Rhythm: Normal rate and regular rhythm.      Heart sounds: Normal heart sounds. No murmur. No friction rub. No gallop.    Pulmonary:      Effort: Pulmonary effort is normal. No respiratory distress.      Breath sounds: Examination of the right-lower field reveals rhonchi. Examination of the left-lower field reveals rhonchi. Decreased breath sounds and rhonchi (cleared wtih cough) present. No wheezing or rales.   Abdominal:      General: Bowel sounds are normal. There is no distension.      Palpations: Abdomen is " soft.      Tenderness: There is no abdominal tenderness. There is no guarding or rebound.   Musculoskeletal:      Lumbar back: He exhibits tenderness, pain and spasm.        Back:    Skin:     General: Skin is warm and dry.   Neurological:      Mental Status: He is alert.   Psychiatric:         Behavior: Behavior normal.         Assessment/Plan   Diagnoses and all orders for this visit:    1. Mixed hyperlipidemia (Primary)    2. Primary osteoarthritis involving multiple joints    3. Chronic right-sided low back pain without sciatica  -     methylPREDNISolone acetate (DEPO-medrol) injection 80 mg    4. Polyp of colon, unspecified part of colon, unspecified type  -     Ambulatory Referral to Gastroenterology    5. Acute bronchitis, unspecified organism  -     methylPREDNISolone acetate (DEPO-medrol) injection 80 mg    tolerated depo medrol in past;  Will try see if settles cough and back pain flare  -HLD - continue statin, recheck lipids.  Discussed with long-term use of nsaids and concerns for renal, gi and cardiovascular risks.  Recommend use only prn and monitor renal function.  -due c-scope - refer  Recommend quit smoking, call when ready and will send in medications         -Follow up: 6 months and prn

## 2021-01-22 ENCOUNTER — TELEPHONE (OUTPATIENT)
Dept: GASTROENTEROLOGY | Facility: CLINIC | Age: 56
End: 2021-01-22

## 2021-01-22 LAB
ALBUMIN SERPL-MCNC: 4 G/DL (ref 3.5–5.2)
ALBUMIN/GLOB SERPL: 1.6 G/DL
ALP SERPL-CCNC: 63 U/L (ref 39–117)
ALT SERPL-CCNC: 29 U/L (ref 1–41)
AST SERPL-CCNC: 17 U/L (ref 1–40)
BASOPHILS # BLD AUTO: 0.06 10*3/MM3 (ref 0–0.2)
BASOPHILS NFR BLD AUTO: 0.9 % (ref 0–1.5)
BILIRUB SERPL-MCNC: 0.5 MG/DL (ref 0–1.2)
BUN SERPL-MCNC: 14 MG/DL (ref 6–20)
BUN/CREAT SERPL: 15.9 (ref 7–25)
CALCIUM SERPL-MCNC: 9.2 MG/DL (ref 8.6–10.5)
CHLORIDE SERPL-SCNC: 107 MMOL/L (ref 98–107)
CHOLEST SERPL-MCNC: 146 MG/DL (ref 0–200)
CO2 SERPL-SCNC: 30.3 MMOL/L (ref 22–29)
CREAT SERPL-MCNC: 0.88 MG/DL (ref 0.76–1.27)
EOSINOPHIL # BLD AUTO: 0.47 10*3/MM3 (ref 0–0.4)
EOSINOPHIL NFR BLD AUTO: 7 % (ref 0.3–6.2)
ERYTHROCYTE [DISTWIDTH] IN BLOOD BY AUTOMATED COUNT: 11.8 % (ref 12.3–15.4)
GLOBULIN SER CALC-MCNC: 2.5 GM/DL
GLUCOSE SERPL-MCNC: 95 MG/DL (ref 65–99)
HCT VFR BLD AUTO: 46.2 % (ref 37.5–51)
HDLC SERPL-MCNC: 38 MG/DL (ref 40–60)
HGB BLD-MCNC: 16 G/DL (ref 13–17.7)
IMM GRANULOCYTES # BLD AUTO: 0.02 10*3/MM3 (ref 0–0.05)
IMM GRANULOCYTES NFR BLD AUTO: 0.3 % (ref 0–0.5)
LDLC SERPL CALC-MCNC: 91 MG/DL (ref 0–100)
LYMPHOCYTES # BLD AUTO: 1.72 10*3/MM3 (ref 0.7–3.1)
LYMPHOCYTES NFR BLD AUTO: 25.4 % (ref 19.6–45.3)
MCH RBC QN AUTO: 31.5 PG (ref 26.6–33)
MCHC RBC AUTO-ENTMCNC: 34.6 G/DL (ref 31.5–35.7)
MCV RBC AUTO: 90.9 FL (ref 79–97)
MONOCYTES # BLD AUTO: 0.57 10*3/MM3 (ref 0.1–0.9)
MONOCYTES NFR BLD AUTO: 8.4 % (ref 5–12)
NEUTROPHILS # BLD AUTO: 3.92 10*3/MM3 (ref 1.7–7)
NEUTROPHILS NFR BLD AUTO: 58 % (ref 42.7–76)
NRBC BLD AUTO-RTO: 0 /100 WBC (ref 0–0.2)
PLATELET # BLD AUTO: 192 10*3/MM3 (ref 140–450)
POTASSIUM SERPL-SCNC: 4.1 MMOL/L (ref 3.5–5.2)
PROT SERPL-MCNC: 6.5 G/DL (ref 6–8.5)
RBC # BLD AUTO: 5.08 10*6/MM3 (ref 4.14–5.8)
SODIUM SERPL-SCNC: 144 MMOL/L (ref 136–145)
TRIGL SERPL-MCNC: 90 MG/DL (ref 0–150)
VLDLC SERPL CALC-MCNC: 17 MG/DL (ref 5–40)
WBC # BLD AUTO: 6.76 10*3/MM3 (ref 3.4–10.8)

## 2021-02-22 RX ORDER — ATORVASTATIN CALCIUM 20 MG/1
TABLET, FILM COATED ORAL
Qty: 90 TABLET | Refills: 1 | Status: SHIPPED | OUTPATIENT
Start: 2021-02-22 | End: 2021-09-05 | Stop reason: SDUPTHER

## 2021-03-01 ENCOUNTER — OFFICE VISIT (OUTPATIENT)
Dept: FAMILY MEDICINE CLINIC | Facility: CLINIC | Age: 56
End: 2021-03-01

## 2021-03-01 VITALS
BODY MASS INDEX: 43.47 KG/M2 | DIASTOLIC BLOOD PRESSURE: 74 MMHG | SYSTOLIC BLOOD PRESSURE: 126 MMHG | HEIGHT: 67 IN | WEIGHT: 277 LBS | OXYGEN SATURATION: 98 % | HEART RATE: 64 BPM

## 2021-03-01 DIAGNOSIS — M25.60 JOINT STIFFNESS: ICD-10-CM

## 2021-03-01 DIAGNOSIS — Z23 IMMUNIZATION DUE: ICD-10-CM

## 2021-03-01 DIAGNOSIS — Z12.12 ENCOUNTER FOR COLORECTAL CANCER SCREENING: ICD-10-CM

## 2021-03-01 DIAGNOSIS — I73.00 RAYNAUD'S PHENOMENON WITHOUT GANGRENE: Primary | ICD-10-CM

## 2021-03-01 DIAGNOSIS — M15.9 PRIMARY OSTEOARTHRITIS INVOLVING MULTIPLE JOINTS: ICD-10-CM

## 2021-03-01 DIAGNOSIS — Z12.11 ENCOUNTER FOR COLORECTAL CANCER SCREENING: ICD-10-CM

## 2021-03-01 DIAGNOSIS — R53.83 OTHER FATIGUE: ICD-10-CM

## 2021-03-01 PROCEDURE — 90471 IMMUNIZATION ADMIN: CPT | Performed by: FAMILY MEDICINE

## 2021-03-01 PROCEDURE — 90715 TDAP VACCINE 7 YRS/> IM: CPT | Performed by: FAMILY MEDICINE

## 2021-03-01 PROCEDURE — 99214 OFFICE O/P EST MOD 30 MIN: CPT | Performed by: FAMILY MEDICINE

## 2021-03-01 NOTE — PROGRESS NOTES
Subjective   Miles Munoz is a 55 y.o. male. Presents today for   Chief Complaint   Patient presents with   • cold & purple hands       Hand Pain   The incident occurred more than 1 week ago. The incident occurred at home. Injury mechanism: no injury;  remote hx of carpal tunnel release. The pain is present in the right hand and left hand. The quality of the pain is described as aching, cramping and shooting. The pain is moderate. Pertinent negatives include no numbness or tingling. Associated symptoms comments: Reports joint stiffness, hard time moving joints;   Hands with cold exposure purplish/red and hurt.. Exacerbated by: cold exposure. Treatments tried: warm up  The treatment provided mild relief.     No family hx of RA or lupus that aware of;      Review of Systems   Constitutional: Positive for fatigue.   Musculoskeletal: Positive for arthralgias and joint swelling.   Neurological: Negative for tingling and numbness.       Patient Active Problem List   Diagnosis   • Occult blood positive stool   • Gastroesophageal reflux disease   • Tubulovillous adenoma of colon   • Multiple gastric ulcers   • Snores   • Witnessed apneic spells   • Obesity, morbid, BMI 40.0-49.9 (CMS/HCC)   • Sleep-related bruxism   • MARIA A on CPAP   • Hypersomnia with sleep apnea   • Sleep related hypoxia   • Hyperlipidemia   • Palpitations       Social History     Socioeconomic History   • Marital status:      Spouse name: Not on file   • Number of children: Not on file   • Years of education: Not on file   • Highest education level: Not on file   Tobacco Use   • Smoking status: Current Every Day Smoker     Packs/day: 1.00     Types: Cigarettes     Start date: 10/5/1976   • Smokeless tobacco: Never Used   Substance and Sexual Activity   • Alcohol use: No     Frequency: Never     Comment: occ   • Drug use: No   • Sexual activity: Defer       Allergies   Allergen Reactions   • Prednisone Other (See Comments)     Causes muscles to  "constrict        Current Outpatient Medications on File Prior to Visit   Medication Sig Dispense Refill   • acetaminophen (TYLENOL) 325 MG tablet Take 650 mg by mouth Every 6 (Six) Hours As Needed for Mild Pain .     • atorvastatin (LIPITOR) 20 MG tablet Take 1 tablet by mouth once daily 90 tablet 1   • cetirizine (zyrTEC) 10 MG tablet Take 10 mg by mouth Every Morning.     • ferrous gluconate (FERGON) 324 MG tablet TAKE 1 TABLET BY MOUTH TWICE DAILY WITH MEALS 60 tablet 5   • gabapentin (NEURONTIN) 300 MG capsule Take 1 capsule by mouth 2 (Two) Times a Day. 180 capsule 1   • HYDROcodone-acetaminophen (NORCO) 5-325 MG per tablet Take 1-2 tablets by mouth Every 4 (Four) Hours As Needed (Pain). 30 tablet 0   • meloxicam (MOBIC) 15 MG tablet Take 1 tablet by mouth Daily. 30 tablet 5   • omeprazole (priLOSEC) 40 MG capsule Take 1 capsule by mouth Daily. (Patient taking differently: Take 20 mg by mouth Every Morning.) 90 capsule 1   • sildenafil (VIAGRA) 100 MG tablet Take 100 mg by mouth Daily As Needed for erectile dysfunction. Instructed to withhold med 48 hrs prior to sx     • tiZANidine (ZANAFLEX) 4 MG tablet Take 1 tablet by mouth At Night As Needed for Muscle Spasms. 30 tablet 5     No current facility-administered medications on file prior to visit.        Objective   Vitals:    03/01/21 0957   BP: 126/74   Pulse: 64   SpO2: 98%   Weight: 126 kg (277 lb)   Height: 170.2 cm (67\")     Body mass index is 43.38 kg/m².    Physical Exam  Vitals signs and nursing note reviewed.   Constitutional:       Appearance: He is well-developed.   Neck:      Musculoskeletal: Neck supple.   Skin:     General: Skin is warm and dry.      Capillary Refill: Capillary refill takes less than 2 seconds.      Comments: Cap refill of all digits;  B/l radial pulse +2/4   Neurological:      Mental Status: He is alert.   Psychiatric:         Behavior: Behavior normal.         Assessment/Plan   Diagnoses and all orders for this visit:    1. " Raynaud's phenomenon without gangrene (Primary)  -     C-reactive Protein  -     Sedimentation Rate  -     Rheumatoid Factor  -     LATOSHA With / DsDNA, RNP, Sjogrens A / B, Smith  -     Cyclic Citrul Peptide Antibody, IgG / IgA    2. Joint stiffness  -     C-reactive Protein  -     Sedimentation Rate  -     Rheumatoid Factor  -     LATOSHA With / DsDNA, RNP, Sjogrens A / B, Smith  -     Cyclic Citrul Peptide Antibody, IgG / IgA    3. Primary osteoarthritis involving multiple joints  -     Diclofenac Sodium (VOLTAREN) 1 % gel gel; Apply 4 g topically to the appropriate area as directed 4 (Four) Times a Day As Needed (hand, knee pain).  Dispense: 100 g; Refill: 5    4. Other fatigue  -     TSH    5. Encounter for colorectal cancer screening  -     Ambulatory Referral to Gastroenterology    6. Immunization due  -     Tdap Vaccine Greater Than or Equal To 8yo IM    -keep hands warm, d/w RP;  Will screen for RA as joint stiffness;  Ok topical;  Screen for thyroid as well  -due c-sciope, refer back  -up date Tdap as not had         -Follow up: 7/23/21

## 2021-03-02 ENCOUNTER — TELEPHONE (OUTPATIENT)
Dept: GASTROENTEROLOGY | Facility: CLINIC | Age: 56
End: 2021-03-02

## 2021-03-03 LAB
ANA SER QL: NEGATIVE
CCP IGA+IGG SERPL IA-ACNC: 7 UNITS (ref 0–19)
CRP SERPL-MCNC: <0.3 MG/DL (ref 0–0.5)
ERYTHROCYTE [SEDIMENTATION RATE] IN BLOOD BY WESTERGREN METHOD: 5 MM/HR (ref 0–20)
RHEUMATOID FACT SERPL-ACNC: <10 IU/ML (ref 0–13.9)
TSH SERPL DL<=0.005 MIU/L-ACNC: 0.97 UIU/ML (ref 0.27–4.2)

## 2021-03-19 ENCOUNTER — PREP FOR SURGERY (OUTPATIENT)
Dept: OTHER | Facility: HOSPITAL | Age: 56
End: 2021-03-19

## 2021-03-19 DIAGNOSIS — Z86.010 PERSONAL HISTORY OF COLONIC POLYPS: Primary | ICD-10-CM

## 2021-03-19 RX ORDER — SODIUM CHLORIDE, SODIUM LACTATE, POTASSIUM CHLORIDE, CALCIUM CHLORIDE 600; 310; 30; 20 MG/100ML; MG/100ML; MG/100ML; MG/100ML
30 INJECTION, SOLUTION INTRAVENOUS CONTINUOUS
Status: CANCELLED | OUTPATIENT
Start: 2021-04-27

## 2021-03-26 ENCOUNTER — BULK ORDERING (OUTPATIENT)
Dept: CASE MANAGEMENT | Facility: OTHER | Age: 56
End: 2021-03-26

## 2021-03-26 ENCOUNTER — IMMUNIZATION (OUTPATIENT)
Dept: VACCINE CLINIC | Facility: HOSPITAL | Age: 56
End: 2021-03-26

## 2021-03-26 DIAGNOSIS — Z23 IMMUNIZATION DUE: ICD-10-CM

## 2021-03-26 PROCEDURE — 91300 HC SARSCOV02 VAC 30MCG/0.3ML IM: CPT | Performed by: INTERNAL MEDICINE

## 2021-03-26 PROCEDURE — 0001A: CPT | Performed by: INTERNAL MEDICINE

## 2021-03-30 ENCOUNTER — TELEPHONE (OUTPATIENT)
Dept: GASTROENTEROLOGY | Facility: CLINIC | Age: 56
End: 2021-03-30

## 2021-04-01 PROBLEM — Z86.010 PERSONAL HISTORY OF COLONIC POLYPS: Status: ACTIVE | Noted: 2021-04-01

## 2021-04-01 PROBLEM — Z86.0100 PERSONAL HISTORY OF COLONIC POLYPS: Status: ACTIVE | Noted: 2021-04-01

## 2021-04-13 ENCOUNTER — TRANSCRIBE ORDERS (OUTPATIENT)
Dept: SLEEP MEDICINE | Facility: HOSPITAL | Age: 56
End: 2021-04-13

## 2021-04-13 ENCOUNTER — OFFICE VISIT (OUTPATIENT)
Dept: FAMILY MEDICINE CLINIC | Facility: CLINIC | Age: 56
End: 2021-04-13

## 2021-04-13 VITALS
HEIGHT: 67 IN | DIASTOLIC BLOOD PRESSURE: 76 MMHG | HEART RATE: 67 BPM | OXYGEN SATURATION: 97 % | SYSTOLIC BLOOD PRESSURE: 122 MMHG | BODY MASS INDEX: 44.26 KG/M2 | WEIGHT: 282 LBS

## 2021-04-13 DIAGNOSIS — F17.200 SMOKER: ICD-10-CM

## 2021-04-13 DIAGNOSIS — Z01.818 OTHER SPECIFIED PRE-OPERATIVE EXAMINATION: Primary | ICD-10-CM

## 2021-04-13 DIAGNOSIS — R22.2 SUPRACLAVICULAR MASS: Primary | ICD-10-CM

## 2021-04-13 PROCEDURE — 99213 OFFICE O/P EST LOW 20 MIN: CPT | Performed by: FAMILY MEDICINE

## 2021-04-13 RX ORDER — BUPROPION HYDROCHLORIDE 150 MG/1
150 TABLET, EXTENDED RELEASE ORAL 2 TIMES DAILY
Qty: 60 TABLET | Refills: 5 | Status: SHIPPED | OUTPATIENT
Start: 2021-04-13 | End: 2021-07-23 | Stop reason: HOSPADM

## 2021-04-13 RX ORDER — NICOTINE 21 MG/24HR
PATCH, TRANSDERMAL 24 HOURS TRANSDERMAL
Qty: 42 PATCH | Refills: 0 | Status: SHIPPED | OUTPATIENT
Start: 2021-04-13

## 2021-04-13 RX ORDER — NICOTINE 21 MG/24HR
PATCH, TRANSDERMAL 24 HOURS TRANSDERMAL
Qty: 14 PATCH | Refills: 0 | Status: SHIPPED | OUTPATIENT
Start: 2021-04-13

## 2021-04-13 NOTE — PROGRESS NOTES
Subjective   Miles Munoz is a 55 y.o. male. Presents today for   Chief Complaint   Patient presents with   • Cyst     left top shoulder       History of Present Illness  Patient left supraclavicular rubbery mass, freely mobile;  No pain;  No weight loss;  No fevers or chills;  No night sweats;  No cough or dyspnea;      Contemplating quittin smoking;  Tried wellbutrin no relief, not quite there but hopes to try in 2 to 3 weeks.    Review of Systems   Constitutional: Negative for chills, diaphoresis, fever and unexpected weight change.   Respiratory: Negative for cough and shortness of breath.        Patient Active Problem List   Diagnosis   • Occult blood positive stool   • Gastroesophageal reflux disease   • Tubulovillous adenoma of colon   • Multiple gastric ulcers   • Snores   • Witnessed apneic spells   • Obesity, morbid, BMI 40.0-49.9 (CMS/HCC)   • Sleep-related bruxism   • MARIA A on CPAP   • Hypersomnia with sleep apnea   • Sleep related hypoxia   • Hyperlipidemia   • Palpitations   • Personal history of colonic polyps       Social History     Socioeconomic History   • Marital status:      Spouse name: Not on file   • Number of children: Not on file   • Years of education: Not on file   • Highest education level: Not on file   Tobacco Use   • Smoking status: Current Every Day Smoker     Packs/day: 1.00     Types: Cigarettes     Start date: 10/5/1976   • Smokeless tobacco: Never Used   • Tobacco comment: close to being ready   Substance and Sexual Activity   • Alcohol use: No     Comment: occ   • Drug use: No   • Sexual activity: Defer       Allergies   Allergen Reactions   • Prednisone Other (See Comments)     Causes muscles to constrict        Current Outpatient Medications on File Prior to Visit   Medication Sig Dispense Refill   • acetaminophen (TYLENOL) 325 MG tablet Take 650 mg by mouth Every 6 (Six) Hours As Needed for Mild Pain .     • atorvastatin (LIPITOR) 20 MG tablet Take 1 tablet by mouth  "once daily 90 tablet 1   • cetirizine (zyrTEC) 10 MG tablet Take 10 mg by mouth Every Morning.     • Diclofenac Sodium (VOLTAREN) 1 % gel gel Apply 4 g topically to the appropriate area as directed 4 (Four) Times a Day As Needed (hand, knee pain). 100 g 5   • ferrous gluconate (FERGON) 324 MG tablet TAKE 1 TABLET BY MOUTH TWICE DAILY WITH MEALS 60 tablet 5   • gabapentin (NEURONTIN) 300 MG capsule Take 1 capsule by mouth 2 (Two) Times a Day. 180 capsule 1   • meloxicam (MOBIC) 15 MG tablet Take 1 tablet by mouth Daily. 30 tablet 5   • omeprazole (priLOSEC) 40 MG capsule Take 1 capsule by mouth Daily. (Patient taking differently: Take 20 mg by mouth Every Morning.) 90 capsule 1   • sildenafil (VIAGRA) 100 MG tablet Take 100 mg by mouth Daily As Needed for erectile dysfunction. Instructed to withhold med 48 hrs prior to sx     • [DISCONTINUED] HYDROcodone-acetaminophen (NORCO) 5-325 MG per tablet Take 1-2 tablets by mouth Every 4 (Four) Hours As Needed (Pain). 30 tablet 0   • [DISCONTINUED] tiZANidine (ZANAFLEX) 4 MG tablet Take 1 tablet by mouth At Night As Needed for Muscle Spasms. 30 tablet 5     No current facility-administered medications on file prior to visit.       Objective   Vitals:    04/13/21 1535   BP: 122/76   Pulse: 67   SpO2: 97%   Weight: 128 kg (282 lb)   Height: 170.2 cm (67\")     Body mass index is 44.17 kg/m².    Physical Exam  Vitals and nursing note reviewed.   Constitutional:       Appearance: He is well-developed.   Neck:      Thyroid: No thyroid mass.   Musculoskeletal:      Cervical back: Neck supple.   Lymphadenopathy:      Cervical:      Right cervical: No superficial, deep or posterior cervical adenopathy.     Left cervical: No superficial, deep or posterior cervical adenopathy.      Upper Body:      Left upper body: Supraclavicular adenopathy (near clavicle soft rubbery mass 2cm x 3cm mass) present.   Skin:     General: Skin is warm and dry.   Neurological:      Mental Status: He is " alert.   Psychiatric:         Behavior: Behavior normal.       Component      Latest Ref Rng & Units 1/21/2021 3/1/2021   WBC      3.40 - 10.80 10*3/mm3 6.76    RBC      4.14 - 5.80 10*6/mm3 5.08    Hemoglobin      13.0 - 17.7 g/dL 16.0    Hematocrit      37.5 - 51.0 % 46.2    MCV      79.0 - 97.0 fL 90.9    MCH      26.6 - 33.0 pg 31.5    MCHC      31.5 - 35.7 g/dL 34.6    RDW      12.3 - 15.4 % 11.8 (L)    Platelets      140 - 450 10*3/mm3 192    Neutrophil Rel %      42.7 - 76.0 % 58.0    Lymphocyte Rel %      19.6 - 45.3 % 25.4    Monocyte Rel %      5.0 - 12.0 % 8.4    Eosinophil Rel %      0.3 - 6.2 % 7.0 (H)    Basophil Rel %      0.0 - 1.5 % 0.9    Neutrophils Absolute      1.70 - 7.00 10*3/mm3 3.92    Lymphocytes Absolute      0.70 - 3.10 10*3/mm3 1.72    Monocytes Absolute      0.10 - 0.90 10*3/mm3 0.57    Eosinophils Absolute      0.00 - 0.40 10*3/mm3 0.47 (H)    Basophils Absolute      0.00 - 0.20 10*3/mm3 0.06    Immature Granulocyte Rel %      0.0 - 0.5 % 0.3    Immature Grans, Absolute      0.00 - 0.05 10*3/mm3 0.02    nRBC      0.0 - 0.2 /100 WBC 0.0    Glucose      65 - 99 mg/dL 95    BUN      6 - 20 mg/dL 14    Creatinine      0.76 - 1.27 mg/dL 0.88    eGFR Non African Am      >60 mL/min/1.73 90    eGFR African Am      >60 mL/min/1.73 109    BUN/Creatinine Ratio      7.0 - 25.0 15.9    Sodium      136 - 145 mmol/L 144    Potassium      3.5 - 5.2 mmol/L 4.1    Chloride      98 - 107 mmol/L 107    CO2      22.0 - 29.0 mmol/L 30.3 (H)    Calcium      8.6 - 10.5 mg/dL 9.2    Total Protein      6.0 - 8.5 g/dL 6.5    Albumin      3.50 - 5.20 g/dL 4.00    Globulin      gm/dL 2.5    A/G Ratio      g/dL 1.6    Total Bilirubin      0.0 - 1.2 mg/dL 0.5    Alkaline Phosphatase      39 - 117 U/L 63    AST (SGOT)      1 - 40 U/L 17    ALT (SGPT)      1 - 41 U/L 29    C-Reactive Protein      0.00 - 0.50 mg/dL  <0.30   Sed Rate      0 - 20 mm/hr  5     Assessment/Plan   Diagnoses and all orders for this  visit:    1. Supraclavicular mass (Primary)  -     CT Chest With Contrast; Future    2. Smoker  -     CT Chest With Contrast; Future  -     nicotine (Nicoderm CQ) 14 MG/24HR patch; Start after 21 mg;  Use 1 patch daily x 14 days then go to 7mg  Dispense: 14 patch; Refill: 0  -     nicotine (Nicoderm CQ) 21 MG/24HR patch; 1 patch daily x 6 weeks then go to next dose  Dispense: 42 patch; Refill: 0  -     nicotine (Nicoderm CQ) 7 MG/24HR patch; Start after 14mg, use 1 pack daily x 2 weeks then stop  Dispense: 14 patch; Refill: 0  -     buPROPion SR (Wellbutrin SR) 150 MG 12 hr tablet; Take 1 tablet by mouth 2 (Two) Times a Day.  Dispense: 60 tablet; Refill: 5    given smoker and location will try to image  Highly recommend quit smoking, I sent meds for anticipation will want to quit soon.  Ok to try wellbutrin and nicoderm to.       -Follow up: after imaging, call if doesn't hear.

## 2021-04-16 ENCOUNTER — IMMUNIZATION (OUTPATIENT)
Dept: VACCINE CLINIC | Facility: HOSPITAL | Age: 56
End: 2021-04-16

## 2021-04-16 PROCEDURE — 91300 HC SARSCOV02 VAC 30MCG/0.3ML IM: CPT | Performed by: INTERNAL MEDICINE

## 2021-04-16 PROCEDURE — 0002A: CPT | Performed by: INTERNAL MEDICINE

## 2021-04-24 ENCOUNTER — LAB (OUTPATIENT)
Dept: LAB | Facility: HOSPITAL | Age: 56
End: 2021-04-24

## 2021-04-24 DIAGNOSIS — Z01.818 OTHER SPECIFIED PRE-OPERATIVE EXAMINATION: ICD-10-CM

## 2021-04-24 PROCEDURE — C9803 HOPD COVID-19 SPEC COLLECT: HCPCS

## 2021-04-24 PROCEDURE — U0004 COV-19 TEST NON-CDC HGH THRU: HCPCS

## 2021-04-26 LAB — SARS-COV-2 RNA RESP QL NAA+PROBE: NOT DETECTED

## 2021-04-27 ENCOUNTER — HOSPITAL ENCOUNTER (OUTPATIENT)
Facility: HOSPITAL | Age: 56
Setting detail: HOSPITAL OUTPATIENT SURGERY
Discharge: HOME | End: 2021-04-27
Attending: INTERNAL MEDICINE | Admitting: INTERNAL MEDICINE

## 2021-04-27 ENCOUNTER — ANESTHESIA EVENT (OUTPATIENT)
Dept: GASTROENTEROLOGY | Facility: HOSPITAL | Age: 56
End: 2021-04-27

## 2021-04-27 ENCOUNTER — ANESTHESIA (OUTPATIENT)
Dept: GASTROENTEROLOGY | Facility: HOSPITAL | Age: 56
End: 2021-04-27

## 2021-04-27 VITALS
HEART RATE: 57 BPM | HEIGHT: 67 IN | OXYGEN SATURATION: 97 % | RESPIRATION RATE: 16 BRPM | DIASTOLIC BLOOD PRESSURE: 81 MMHG | SYSTOLIC BLOOD PRESSURE: 148 MMHG | BODY MASS INDEX: 43.32 KG/M2 | WEIGHT: 276 LBS

## 2021-04-27 DIAGNOSIS — Z86.010 PERSONAL HISTORY OF COLONIC POLYPS: ICD-10-CM

## 2021-04-27 PROCEDURE — 25010000002 PROPOFOL 10 MG/ML EMULSION: Performed by: NURSE ANESTHETIST, CERTIFIED REGISTERED

## 2021-04-27 PROCEDURE — 45378 DIAGNOSTIC COLONOSCOPY: CPT | Performed by: INTERNAL MEDICINE

## 2021-04-27 RX ORDER — PROPOFOL 10 MG/ML
VIAL (ML) INTRAVENOUS AS NEEDED
Status: DISCONTINUED | OUTPATIENT
Start: 2021-04-27 | End: 2021-04-27 | Stop reason: SURG

## 2021-04-27 RX ORDER — LIDOCAINE HYDROCHLORIDE 20 MG/ML
INJECTION, SOLUTION INFILTRATION; PERINEURAL AS NEEDED
Status: DISCONTINUED | OUTPATIENT
Start: 2021-04-27 | End: 2021-04-27 | Stop reason: SURG

## 2021-04-27 RX ORDER — SODIUM CHLORIDE, SODIUM LACTATE, POTASSIUM CHLORIDE, CALCIUM CHLORIDE 600; 310; 30; 20 MG/100ML; MG/100ML; MG/100ML; MG/100ML
30 INJECTION, SOLUTION INTRAVENOUS CONTINUOUS
Status: DISCONTINUED | OUTPATIENT
Start: 2021-04-27 | End: 2021-04-27 | Stop reason: HOSPADM

## 2021-04-27 RX ADMIN — PROPOFOL 200 MCG/KG/MIN: 10 INJECTION, EMULSION INTRAVENOUS at 09:01

## 2021-04-27 RX ADMIN — SODIUM CHLORIDE, POTASSIUM CHLORIDE, SODIUM LACTATE AND CALCIUM CHLORIDE 30 ML/HR: 600; 310; 30; 20 INJECTION, SOLUTION INTRAVENOUS at 08:55

## 2021-04-27 RX ADMIN — LIDOCAINE HYDROCHLORIDE 100 MG: 20 INJECTION, SOLUTION INFILTRATION; PERINEURAL at 09:01

## 2021-04-27 RX ADMIN — PROPOFOL 90 MG: 10 INJECTION, EMULSION INTRAVENOUS at 09:01

## 2021-04-27 NOTE — ANESTHESIA PREPROCEDURE EVALUATION
Anesthesia Evaluation     no history of anesthetic complications:               Airway   Mallampati: II  TM distance: >3 FB  Neck ROM: full  Narrow palate  Dental - normal exam     Pulmonary    (+) a smoker Current, sleep apnea,   (-) shortness of breath, recent URI  Cardiovascular     (+) dysrhythmias (holter monitor last years OK, but has occasional palpitations), hyperlipidemia,       Neuro/Psych  GI/Hepatic/Renal/Endo    (+) morbid obesity, GERD,  renal disease,     Musculoskeletal     Abdominal    Substance History      OB/GYN          Other                        Anesthesia Plan    ASA 3     MAC     intravenous induction     Anesthetic plan, all risks, benefits, and alternatives have been provided, discussed and informed consent has been obtained with: patient.

## 2021-04-27 NOTE — ANESTHESIA POSTPROCEDURE EVALUATION
"Patient: Miles Munoz    Procedure Summary     Date: 04/27/21 Room / Location:  [facility]    Anesthesia Start: 0856 Anesthesia Stop: 0919    Procedure: COLONOSCOPY TO CECUM (N/A ) Diagnosis:       Personal history of colonic polyps      (Personal history of colonic polyps [Z86.010])    Surgeons: Leopoldo Thompson MD Provider: Karen Schafer MD    Anesthesia Type: MAC ASA Status: 3          Anesthesia Type: MAC    Vitals  Vitals Value Taken Time   /81 04/27/21 0941   Temp     Pulse 57 04/27/21 0941   Resp 16 04/27/21 0941   SpO2 97 % 04/27/21 0941           Post Anesthesia Care and Evaluation    Patient location during evaluation: PHASE II  Patient participation: complete - patient participated  Level of consciousness: awake and alert  Pain management: adequate  Airway patency: patent  Anesthetic complications: No anesthetic complications    Cardiovascular status: acceptable  Respiratory status: acceptable  Hydration status: acceptable    Comments: /81 (BP Location: Left arm, Patient Position: Lying)   Pulse 57   Resp 16   Ht 170.2 cm (67\")   Wt 125 kg (276 lb)   SpO2 97%   BMI 43.23 kg/m²         "

## 2021-04-28 ENCOUNTER — APPOINTMENT (OUTPATIENT)
Dept: CT IMAGING | Facility: HOSPITAL | Age: 56
End: 2021-04-28

## 2021-04-29 ENCOUNTER — TELEPHONE (OUTPATIENT)
Dept: FAMILY MEDICINE CLINIC | Facility: CLINIC | Age: 56
End: 2021-04-29

## 2021-04-29 DIAGNOSIS — R05.9 COUGH: Primary | ICD-10-CM

## 2021-04-29 NOTE — TELEPHONE ENCOUNTER
CT chest is being denied by insurance because no chest xray was preformed. Case# [id number] P# [phone]. Please call Marge back to let her know if Dr. Espinosa wants to do a peer to peer since he is scheduled 5-6-21 at [facility].Thanks.

## 2021-04-29 NOTE — TELEPHONE ENCOUNTER
I put order in for xr chest and faxed to [facility]. Pt informed to go there to have done and then we'll try to get the CT lung screening done.

## 2021-04-29 NOTE — TELEPHONE ENCOUNTER
What the heck?  It is for lung cancer screening of which chest xray does absolutely nothing.  Sure, I will do a peer to peer,

## 2021-05-06 ENCOUNTER — APPOINTMENT (OUTPATIENT)
Dept: CT IMAGING | Facility: HOSPITAL | Age: 56
End: 2021-05-06

## 2021-05-29 DIAGNOSIS — M54.50 CHRONIC BILATERAL LOW BACK PAIN WITHOUT SCIATICA: ICD-10-CM

## 2021-05-29 DIAGNOSIS — G89.29 CHRONIC BILATERAL LOW BACK PAIN WITHOUT SCIATICA: ICD-10-CM

## 2021-05-29 DIAGNOSIS — M15.9 PRIMARY OSTEOARTHRITIS INVOLVING MULTIPLE JOINTS: ICD-10-CM

## 2021-06-01 RX ORDER — MELOXICAM 15 MG/1
15 TABLET ORAL DAILY
Qty: 30 TABLET | Refills: 5 | Status: SHIPPED | OUTPATIENT
Start: 2021-06-01 | End: 2021-09-05 | Stop reason: SDUPTHER

## 2021-07-23 ENCOUNTER — OFFICE VISIT (OUTPATIENT)
Dept: FAMILY MEDICINE CLINIC | Facility: CLINIC | Age: 56
End: 2021-07-23

## 2021-07-23 VITALS
DIASTOLIC BLOOD PRESSURE: 64 MMHG | HEART RATE: 60 BPM | BODY MASS INDEX: 44.73 KG/M2 | HEIGHT: 67 IN | WEIGHT: 285 LBS | OXYGEN SATURATION: 97 % | SYSTOLIC BLOOD PRESSURE: 136 MMHG

## 2021-07-23 DIAGNOSIS — R73.9 HYPERGLYCEMIA: ICD-10-CM

## 2021-07-23 DIAGNOSIS — Z00.00 WELLNESS EXAMINATION: Primary | ICD-10-CM

## 2021-07-23 DIAGNOSIS — F17.210 CIGARETTE NICOTINE DEPENDENCE WITHOUT COMPLICATION: ICD-10-CM

## 2021-07-23 DIAGNOSIS — G47.33 OSA ON CPAP: ICD-10-CM

## 2021-07-23 DIAGNOSIS — E78.2 MIXED HYPERLIPIDEMIA: ICD-10-CM

## 2021-07-23 DIAGNOSIS — Z12.2 ENCOUNTER FOR SCREENING FOR LUNG CANCER: ICD-10-CM

## 2021-07-23 DIAGNOSIS — Z99.89 OSA ON CPAP: ICD-10-CM

## 2021-07-23 PROCEDURE — 99396 PREV VISIT EST AGE 40-64: CPT | Performed by: FAMILY MEDICINE

## 2021-07-24 LAB
ALBUMIN SERPL-MCNC: 3.9 G/DL (ref 3.5–5.2)
ALBUMIN/GLOB SERPL: 1.6 G/DL
ALP SERPL-CCNC: 63 U/L (ref 39–117)
ALT SERPL-CCNC: 27 U/L (ref 1–41)
AST SERPL-CCNC: 19 U/L (ref 1–40)
BILIRUB SERPL-MCNC: 0.5 MG/DL (ref 0–1.2)
BUN SERPL-MCNC: 14 MG/DL (ref 6–20)
BUN/CREAT SERPL: 15.2 (ref 7–25)
CALCIUM SERPL-MCNC: 9.1 MG/DL (ref 8.6–10.5)
CHLORIDE SERPL-SCNC: 109 MMOL/L (ref 98–107)
CHOLEST SERPL-MCNC: 128 MG/DL (ref 0–200)
CO2 SERPL-SCNC: 28.8 MMOL/L (ref 22–29)
CREAT SERPL-MCNC: 0.92 MG/DL (ref 0.76–1.27)
GLOBULIN SER CALC-MCNC: 2.4 GM/DL
GLUCOSE SERPL-MCNC: 92 MG/DL (ref 65–99)
HBA1C MFR BLD: 5.5 % (ref 4.8–5.6)
HDLC SERPL-MCNC: 36 MG/DL (ref 40–60)
LDLC SERPL CALC-MCNC: 79 MG/DL (ref 0–100)
POTASSIUM SERPL-SCNC: 4.7 MMOL/L (ref 3.5–5.2)
PROT SERPL-MCNC: 6.3 G/DL (ref 6–8.5)
SODIUM SERPL-SCNC: 145 MMOL/L (ref 136–145)
TRIGL SERPL-MCNC: 62 MG/DL (ref 0–150)
VLDLC SERPL CALC-MCNC: 13 MG/DL (ref 5–40)

## 2021-08-03 DIAGNOSIS — M54.50 CHRONIC BILATERAL LOW BACK PAIN WITHOUT SCIATICA: ICD-10-CM

## 2021-08-03 DIAGNOSIS — G56.03 BILATERAL CARPAL TUNNEL SYNDROME: ICD-10-CM

## 2021-08-03 DIAGNOSIS — G89.29 CHRONIC BILATERAL LOW BACK PAIN WITHOUT SCIATICA: ICD-10-CM

## 2021-08-04 RX ORDER — GABAPENTIN 300 MG/1
300 CAPSULE ORAL 2 TIMES DAILY
Qty: 180 CAPSULE | Refills: 1 | Status: SHIPPED | OUTPATIENT
Start: 2021-08-04 | End: 2022-02-21

## 2021-08-16 ENCOUNTER — TELEPHONE (OUTPATIENT)
Dept: FAMILY MEDICINE CLINIC | Facility: CLINIC | Age: 56
End: 2021-08-16

## 2021-08-16 NOTE — TELEPHONE ENCOUNTER
AMRITA FROM [facility]  CALLING STATING THE PATIENT HAS CHANGED LOCATIONS TO HAVE HIS CT SCAN SHE WOULD LIKE TO GET THE ORDER SENT TO [facility] F:[phone].    GOOD CALL BACK  [phone]

## 2021-08-17 ENCOUNTER — TELEPHONE (OUTPATIENT)
Dept: FAMILY MEDICINE CLINIC | Facility: CLINIC | Age: 56
End: 2021-08-17

## 2021-08-17 NOTE — TELEPHONE ENCOUNTER
LES IS CALLING TO GET THE ORDER FOR HIS CT SCAN TO BE SENT TO [facility] [phone]. HE IS NEEDING TO GET IT DONE THERE. THE FAX NUMBER [phone].

## 2021-08-18 NOTE — TELEPHONE ENCOUNTER
Caller: AMRITA    Relationship:     Best call back number: [phone]    What was the call regarding: LES CALLED REGARDING THIS PAPERWORK BEING FAXED TO [facility]. THEY SPOKE WITH [facility] YESTERDAY AND THEY DID NOT HAVE THEM. PLEASE ADVISE.     Do you require a callback: YES

## 2021-08-19 ENCOUNTER — APPOINTMENT (OUTPATIENT)
Dept: CT IMAGING | Facility: HOSPITAL | Age: 56
End: 2021-08-19

## 2021-09-02 ENCOUNTER — OFFICE VISIT (OUTPATIENT)
Dept: FAMILY MEDICINE CLINIC | Facility: CLINIC | Age: 56
End: 2021-09-02

## 2021-09-02 VITALS — OXYGEN SATURATION: 96 % | HEART RATE: 79 BPM | TEMPERATURE: 99.6 F

## 2021-09-02 DIAGNOSIS — Z20.822 EXPOSURE TO COVID-19 VIRUS: Primary | ICD-10-CM

## 2021-09-02 DIAGNOSIS — Z53.21 PATIENT LEFT WITHOUT BEING SEEN: ICD-10-CM

## 2021-09-02 NOTE — PROGRESS NOTES
Subjective   Miles Munoz is a 55 y.o. male. Presents today for   Chief Complaint   Patient presents with   • Cough     Symptoms started 8/30/21   • Nasal Congestion       History of Present Illness    Review of Systems    Patient Active Problem List   Diagnosis   • Occult blood positive stool   • Gastroesophageal reflux disease   • Tubulovillous adenoma of colon   • Multiple gastric ulcers   • Snores   • Witnessed apneic spells   • Obesity, morbid, BMI 40.0-49.9 (CMS/[facility])   • Sleep-related bruxism   • MARIA A on CPAP   • Hypersomnia with sleep apnea   • Sleep related hypoxia   • Hyperlipidemia   • Palpitations   • Personal history of colonic polyps       Social History     Socioeconomic History   • Marital status:      Spouse name: Not on file   • Number of children: Not on file   • Years of education: Not on file   • Highest education level: Not on file   Tobacco Use   • Smoking status: Current Every Day Smoker     Packs/day: 1.00     Types: Cigarettes     Start date: 10/5/1976   • Smokeless tobacco: Never Used   • Tobacco comment: close to being ready   Substance and Sexual Activity   • Alcohol use: No     Comment: occ   • Drug use: No   • Sexual activity: Defer       Allergies   Allergen Reactions   • Prednisone Other (See Comments)     Causes muscles to constrict        Current Outpatient Medications on File Prior to Visit   Medication Sig Dispense Refill   • acetaminophen (TYLENOL) 325 MG tablet Take 650 mg by mouth Every 6 (Six) Hours As Needed for Mild Pain .     • atorvastatin (LIPITOR) 20 MG tablet Take 1 tablet by mouth once daily 90 tablet 1   • cetirizine (zyrTEC) 10 MG tablet Take 10 mg by mouth Every Morning.     • Diclofenac Sodium (VOLTAREN) 1 % gel gel Apply 4 g topically to the appropriate area as directed 4 (Four) Times a Day As Needed (hand, knee pain). 100 g 5   • ferrous gluconate (FERGON) 324 MG tablet TAKE 1 TABLET BY MOUTH TWICE DAILY WITH MEALS 60 tablet 5   • gabapentin (NEURONTIN)  300 MG capsule Take 1 capsule by mouth 2 (Two) Times a Day. 180 capsule 1   • meloxicam (MOBIC) 15 MG tablet Take 1 tablet by mouth Daily. 30 tablet 5   • nicotine (Nicoderm CQ) 14 MG/24HR patch Start after 21 mg;  Use 1 patch daily x 14 days then go to 7mg 14 patch 0   • nicotine (Nicoderm CQ) 21 MG/24HR patch 1 patch daily x 6 weeks then go to next dose 42 patch 0   • nicotine (Nicoderm CQ) 7 MG/24HR patch Start after 14mg, use 1 pack daily x 2 weeks then stop 14 patch 0   • omeprazole (priLOSEC) 40 MG capsule Take 1 capsule by mouth Daily. (Patient taking differently: Take 20 mg by mouth Every Morning.) 90 capsule 1   • sildenafil (VIAGRA) 100 MG tablet Take 100 mg by mouth Daily As Needed for erectile dysfunction. Instructed to withhold med 48 hrs prior to sx       No current facility-administered medications on file prior to visit.       Objective   Vitals:    09/02/21 1030   Pulse: 79   Temp: 99.6 °F (37.6 °C)   TempSrc: Temporal   SpO2: 96%     There is no height or weight on file to calculate BMI.    Physical Exam    Assessment/Plan   Diagnoses and all orders for this visit:    1. Exposure to COVID-19 virus (Primary)  -     COVID-19,[facility] ROUTINE, NP/OP SWAB IN TRANSPORT MEDIA OR ESWAB 72 HR TAT - Swab, Oropharynx    2. Patient left without being seen    only tested for covid 19, told staff felt better  I did not see, no billing         -Follow up:

## 2021-09-04 LAB — SARS-COV-2 RNA RESP QL NAA+PROBE: NOT DETECTED

## 2021-09-05 DIAGNOSIS — M15.9 PRIMARY OSTEOARTHRITIS INVOLVING MULTIPLE JOINTS: ICD-10-CM

## 2021-09-05 DIAGNOSIS — G89.29 CHRONIC BILATERAL LOW BACK PAIN WITHOUT SCIATICA: ICD-10-CM

## 2021-09-05 DIAGNOSIS — M54.50 CHRONIC BILATERAL LOW BACK PAIN WITHOUT SCIATICA: ICD-10-CM

## 2021-09-07 RX ORDER — MELOXICAM 15 MG/1
15 TABLET ORAL DAILY
Qty: 30 TABLET | Refills: 11 | Status: SHIPPED | OUTPATIENT
Start: 2021-09-07 | End: 2022-09-01

## 2021-09-07 RX ORDER — ATORVASTATIN CALCIUM 20 MG/1
20 TABLET, FILM COATED ORAL DAILY
Qty: 90 TABLET | Refills: 3 | Status: SHIPPED | OUTPATIENT
Start: 2021-09-07 | End: 2022-08-09

## 2021-09-10 ENCOUNTER — APPOINTMENT (OUTPATIENT)
Dept: SLEEP MEDICINE | Facility: HOSPITAL | Age: 56
End: 2021-09-10

## 2021-12-16 ENCOUNTER — OFFICE VISIT (OUTPATIENT)
Dept: FAMILY MEDICINE CLINIC | Facility: CLINIC | Age: 56
End: 2021-12-16

## 2021-12-16 VITALS
WEIGHT: 285 LBS | DIASTOLIC BLOOD PRESSURE: 72 MMHG | OXYGEN SATURATION: 97 % | BODY MASS INDEX: 44.73 KG/M2 | HEART RATE: 76 BPM | SYSTOLIC BLOOD PRESSURE: 118 MMHG | HEIGHT: 67 IN

## 2021-12-16 DIAGNOSIS — J01.10 ACUTE NON-RECURRENT FRONTAL SINUSITIS: Primary | ICD-10-CM

## 2021-12-16 DIAGNOSIS — H66.001 NON-RECURRENT ACUTE SUPPURATIVE OTITIS MEDIA OF RIGHT EAR WITHOUT SPONTANEOUS RUPTURE OF TYMPANIC MEMBRANE: ICD-10-CM

## 2021-12-16 PROCEDURE — 99213 OFFICE O/P EST LOW 20 MIN: CPT | Performed by: FAMILY MEDICINE

## 2021-12-16 RX ORDER — METHYLPREDNISOLONE 4 MG/1
TABLET ORAL
Qty: 21 TABLET | Refills: 0 | Status: SHIPPED | OUTPATIENT
Start: 2021-12-16 | End: 2022-01-28

## 2021-12-16 RX ORDER — AMOXICILLIN 875 MG/1
875 TABLET, COATED ORAL 2 TIMES DAILY
Qty: 20 TABLET | Refills: 0 | Status: SHIPPED | OUTPATIENT
Start: 2021-12-16 | End: 2022-01-28

## 2021-12-16 RX ORDER — DEXTROMETHORPHAN HYDROBROMIDE AND PROMETHAZINE HYDROCHLORIDE 15; 6.25 MG/5ML; MG/5ML
5 SYRUP ORAL 4 TIMES DAILY PRN
Qty: 180 ML | Refills: 0 | Status: SHIPPED | OUTPATIENT
Start: 2021-12-16 | End: 2022-01-28

## 2021-12-16 NOTE — PROGRESS NOTES
Subjective   Miles Munoz is a 56 y.o. male. Presents today for   Chief Complaint   Patient presents with   • Cough     dry n- COVID Neg   • Sinusitis       Cough  This is a recurrent problem. The current episode started 1 to 4 weeks ago. The problem has been unchanged. The problem occurs constantly. The cough is productive of sputum. Associated symptoms include ear pain, nasal congestion, postnasal drip and wheezing. Pertinent negatives include no chills, fever or shortness of breath. He has tried a beta-agonist inhaler for the symptoms. The treatment provided moderate relief.   Sinusitis  Associated symptoms include coughing and ear pain. Pertinent negatives include no chills or shortness of breath.     Seen ICC and negative covid 19 and flu;     Review of Systems   Constitutional: Negative for chills and fever.   HENT: Positive for ear pain and postnasal drip.    Respiratory: Positive for cough and wheezing. Negative for shortness of breath.        Patient Active Problem List   Diagnosis   • Occult blood positive stool   • Gastroesophageal reflux disease   • Tubulovillous adenoma of colon   • Multiple gastric ulcers   • Snores   • Witnessed apneic spells   • Obesity, morbid, BMI 40.0-49.9 ([facility])   • Sleep-related bruxism   • MARIA A on CPAP   • Hypersomnia with sleep apnea   • Sleep related hypoxia   • Hyperlipidemia   • Palpitations   • Personal history of colonic polyps       Social History     Socioeconomic History   • Marital status:    Tobacco Use   • Smoking status: Current Every Day Smoker     Packs/day: 1.00     Types: Cigarettes     Start date: 10/5/1976   • Smokeless tobacco: Never Used   • Tobacco comment: close to being ready   Substance and Sexual Activity   • Alcohol use: No     Comment: occ   • Drug use: No   • Sexual activity: Defer       Allergies   Allergen Reactions   • Prednisone Other (See Comments)     Causes muscles to constrict        Current Outpatient Medications on File Prior to  "Visit   Medication Sig Dispense Refill   • acetaminophen (TYLENOL) 325 MG tablet Take 650 mg by mouth Every 6 (Six) Hours As Needed for Mild Pain .     • atorvastatin (LIPITOR) 20 MG tablet Take 1 tablet by mouth Daily. 90 tablet 3   • cetirizine (zyrTEC) 10 MG tablet Take 10 mg by mouth Every Morning.     • Diclofenac Sodium (VOLTAREN) 1 % gel gel Apply 4 g topically to the appropriate area as directed 4 (Four) Times a Day As Needed (hand, knee pain). 100 g 5   • ferrous gluconate (FERGON) 324 MG tablet TAKE 1 TABLET BY MOUTH TWICE DAILY WITH MEALS 60 tablet 5   • gabapentin (NEURONTIN) 300 MG capsule Take 1 capsule by mouth 2 (Two) Times a Day. 180 capsule 1   • meloxicam (MOBIC) 15 MG tablet Take 1 tablet by mouth Daily. 30 tablet 11   • omeprazole (priLOSEC) 40 MG capsule Take 1 capsule by mouth Daily. (Patient taking differently: Take 20 mg by mouth Every Morning.) 90 capsule 1   • sildenafil (VIAGRA) 100 MG tablet Take 100 mg by mouth Daily As Needed for erectile dysfunction. Instructed to withhold med 48 hrs prior to sx     • nicotine (Nicoderm CQ) 14 MG/24HR patch Start after 21 mg;  Use 1 patch daily x 14 days then go to 7mg 14 patch 0   • nicotine (Nicoderm CQ) 21 MG/24HR patch 1 patch daily x 6 weeks then go to next dose 42 patch 0   • nicotine (Nicoderm CQ) 7 MG/24HR patch Start after 14mg, use 1 pack daily x 2 weeks then stop 14 patch 0     No current facility-administered medications on file prior to visit.       Objective   Vitals:    12/16/21 1417   BP: 118/72   Pulse: 76   SpO2: 97%   Weight: 129 kg (285 lb)   Height: 170.2 cm (67\")     Body mass index is 44.64 kg/m².    Physical Exam  Vitals and nursing note reviewed.   Constitutional:       Appearance: He is well-developed.   HENT:      Head: Normocephalic and atraumatic.      Right Ear: A middle ear effusion is present. Tympanic membrane is injected.      Left Ear: Tympanic membrane normal.   Neck:      Thyroid: No thyromegaly.      Vascular: No " JVD.   Cardiovascular:      Rate and Rhythm: Normal rate and regular rhythm.      Heart sounds: Normal heart sounds. No murmur heard.  No friction rub. No gallop.    Pulmonary:      Effort: Pulmonary effort is normal. No respiratory distress.      Breath sounds: Decreased air movement present. Examination of the right-lower field reveals rhonchi. Examination of the left-lower field reveals rhonchi. Rhonchi present. No wheezing or rales.   Abdominal:      General: Bowel sounds are normal. There is no distension.      Palpations: Abdomen is soft.      Tenderness: There is no abdominal tenderness. There is no guarding or rebound.   Musculoskeletal:      Cervical back: Neck supple.   Skin:     General: Skin is warm and dry.   Neurological:      Mental Status: He is alert.   Psychiatric:         Behavior: Behavior normal.         Assessment/Plan   Diagnoses and all orders for this visit:    1. Acute non-recurrent frontal sinusitis (Primary)  -     amoxicillin (AMOXIL) 875 MG tablet; Take 1 tablet by mouth 2 (Two) Times a Day.  Dispense: 20 tablet; Refill: 0  -     promethazine-dextromethorphan (PROMETHAZINE-DM) 6.25-15 MG/5ML syrup; Take 5 mL by mouth 4 (Four) Times a Day As Needed for Cough.  Dispense: 180 mL; Refill: 0  -     methylPREDNISolone (MEDROL) 4 MG dose pack; Take as directed on package instructions.  Dispense: 21 tablet; Refill: 0    2. Non-recurrent acute suppurative otitis media of right ear without spontaneous rupture of tympanic membrane  -     amoxicillin (AMOXIL) 875 MG tablet; Take 1 tablet by mouth 2 (Two) Times a Day.  Dispense: 20 tablet; Refill: 0  -     methylPREDNISolone (MEDROL) 4 MG dose pack; Take as directed on package instructions.  Dispense: 21 tablet; Refill: 0    already had covid 19 negative test;  +smoker; will start abx, medrol pack         -Follow up: Prn - RTC if worse or no improvement.

## 2022-01-24 ENCOUNTER — OFFICE VISIT (OUTPATIENT)
Dept: SLEEP MEDICINE | Facility: HOSPITAL | Age: 57
End: 2022-01-24

## 2022-01-24 ENCOUNTER — TELEPHONE (OUTPATIENT)
Dept: SLEEP MEDICINE | Facility: HOSPITAL | Age: 57
End: 2022-01-24

## 2022-01-24 VITALS
HEIGHT: 67 IN | OXYGEN SATURATION: 98 % | WEIGHT: 278 LBS | BODY MASS INDEX: 43.63 KG/M2 | DIASTOLIC BLOOD PRESSURE: 73 MMHG | HEART RATE: 68 BPM | SYSTOLIC BLOOD PRESSURE: 171 MMHG

## 2022-01-24 DIAGNOSIS — E66.01 MORBID (SEVERE) OBESITY DUE TO EXCESS CALORIES: ICD-10-CM

## 2022-01-24 DIAGNOSIS — Z72.0 TOBACCO USER: ICD-10-CM

## 2022-01-24 DIAGNOSIS — G47.33 OSA (OBSTRUCTIVE SLEEP APNEA): Primary | ICD-10-CM

## 2022-01-24 PROCEDURE — G0463 HOSPITAL OUTPT CLINIC VISIT: HCPCS

## 2022-01-24 NOTE — PROGRESS NOTES
[facility]  Telephone: [phone] / Fax: [phone] [place]  Telephone: [phone] / Fax: [phone] Yocasta Harris    PCP: Aly Espinosa DO    Reason for visit: MARIA A f/u    Miles Munoz is a 56 y.o.male  was last seen at [facility] sleep lab in 2019. He last saw Dr Marti in 2019 and was diagnosed with severe MARIA A in 2018. He has used auto CPAP since [id number] H2O. He has DreamStation CPAP. He registered CPAP 1 month ago. Pending replacement. He has Maddie View mask that works well.  He is a mouth breather and this mask seems to help him the most. He would like to see if pressures could be slightly increased. He adjusted the ramp to 11 cm for 20 minutes on his own. He would like to change DME to [employer].  Overall CPAP machine benefits the patient.    SH- works in sales, no alcohol, current smoker since age 11.    ROS- +myalgias,+GERD, +cold intolerance, rest is negative.    DME - will transfer to [employer]    Current Medications:    Current Outpatient Medications:   •  acetaminophen (TYLENOL) 325 MG tablet, Take 650 mg by mouth Every 6 (Six) Hours As Needed for Mild Pain ., Disp: , Rfl:   •  amoxicillin (AMOXIL) 875 MG tablet, Take 1 tablet by mouth 2 (Two) Times a Day., Disp: 20 tablet, Rfl: 0  •  atorvastatin (LIPITOR) 20 MG tablet, Take 1 tablet by mouth Daily., Disp: 90 tablet, Rfl: 3  •  cetirizine (zyrTEC) 10 MG tablet, Take 10 mg by mouth Every Morning., Disp: , Rfl:   •  Diclofenac Sodium (VOLTAREN) 1 % gel gel, Apply 4 g topically to the appropriate area as directed 4 (Four) Times a Day As Needed (hand, knee pain)., Disp: 100 g, Rfl: 5  •  ferrous gluconate (FERGON) 324 MG tablet, TAKE 1 TABLET BY MOUTH TWICE DAILY WITH MEALS, Disp: 60 tablet, Rfl: 5  •  gabapentin (NEURONTIN) 300 MG capsule, Take 1 capsule by mouth 2 (Two) Times a Day., Disp: 180 capsule, Rfl: 1  •  meloxicam (MOBIC) 15 MG tablet, Take 1 tablet by mouth Daily., Disp: 30 tablet, Rfl: 11  •  methylPREDNISolone  "(MEDROL) 4 MG dose pack, Take as directed on package instructions., Disp: 21 tablet, Rfl: 0  •  nicotine (Nicoderm CQ) 14 MG/24HR patch, Start after 21 mg;  Use 1 patch daily x 14 days then go to 7mg, Disp: 14 patch, Rfl: 0  •  nicotine (Nicoderm CQ) 21 MG/24HR patch, 1 patch daily x 6 weeks then go to next dose, Disp: 42 patch, Rfl: 0  •  nicotine (Nicoderm CQ) 7 MG/24HR patch, Start after 14mg, use 1 pack daily x 2 weeks then stop, Disp: 14 patch, Rfl: 0  •  omeprazole (priLOSEC) 40 MG capsule, Take 1 capsule by mouth Daily. (Patient taking differently: Take 20 mg by mouth Every Morning.), Disp: 90 capsule, Rfl: 1  •  promethazine-dextromethorphan (PROMETHAZINE-DM) 6.25-15 MG/5ML syrup, Take 5 mL by mouth 4 (Four) Times a Day As Needed for Cough., Disp: 180 mL, Rfl: 0  •  sildenafil (VIAGRA) 100 MG tablet, Take 100 mg by mouth Daily As Needed for erectile dysfunction. Instructed to withhold med 48 hrs prior to sx, Disp: , Rfl:    also entered in Sleep Questionnaire    Patient  has a past medical history of Allergic rhinitis, Arthritis, Back pain, GERD (gastroesophageal reflux disease), Hyperlipidemia, Renal stone, Seasonal allergies, and Sleep apnea.    I have reviewed the Past Medical History, Past Surgical History, Social History and Family History.    Vital Signs /73   Pulse 68   Ht 170.2 cm (67\")   Wt 126 kg (278 lb)   SpO2 98%   BMI 43.54 kg/m²  Body mass index is 43.54 kg/m².    General Alert and oriented. No acute distress noted   Pharynx/Throat Class IV  Mallampati airway, large tongue, no evidence of redundant lateral pharyngeal tissue. No oral lesions. No thrush. Moist mucous membranes.   Head Normocephalic. Symmetrical. Atraumatic.    Nose No septal deviation. No drainage   Chest Wall Normal shape. Symmetric expansion with respiration. No tenderness.   Neck Trachea midline, no thyromegaly or adenopathy    Lungs Clear to auscultation bilaterally. No wheezes. No rhonchi. No rales. Respirations " regular, even and unlabored.   Heart Regular rhythm and normal rate. Normal S1 and S2. No murmur   Abdomen Soft, non-tender and non-distended. Normal bowel sounds. No masses.   Extremities Moves all extremities well. No edema   Psychiatric Normal mood and affect.     Testing:  Download - 10/26/21-1/23/22 -100% use with average nightly use of 7 hours and 42 min on auto CPAP 11-20cm H2O, avg pressure of 15 cm H20, AHI 0.5, leak of 9 min and 1 second    Respiratory Disturbance Events:    This is a home sleep study done on 3/13/18, total recording time was 423.5 minutes with total monitoring time of 415.0 minutes.  Respiratory summary: Severe MARIA A with AHI of 30.7 with positional component pain worst in the left position with AHI of 68.7. Oxygenation: Significant hypoxemia was bozena desaturation to 75% with 41.5 minutes spent in the hypoxemic range.  Patient had clusters of significant hypoxemia that are likely to be REM sleep related however no way to confirm without the EEG recording Snoring: Patient had severe snoring at 75.9% of total recording time    Impression:  1. MARIA A (obstructive sleep apnea)    2. Morbid (severe) obesity due to excess calories (HCC)    3. Tobacco user          Plan:  I discussed recent Maurizio recall with patient. I explained the short and long term effects of polyester-based polyurethane(PE-PUR) foam degradation. I have also discussed with patient the consequences of untreated moderate-to severe MARIA A . HE registered the machine with [employer] and is pending replacement device in the next several months. HE does not use [employer]. He was asked to decrease or turn off the humidification level on the CPAP. We will help to get him established with new DME provider for new supplies. He uses Maddie view mask and finds it very comfortable. As he feels that current pressures may be  A bit too low, I recommended to increase the starting pressure to 13cm H2O. We will change the pressures on his card today to  13-20cm H2O. He will keep ramp at 11cm for 20 minutes.     Patient uses the CPAP device and benefits from its use in terms of reduction of hypersomnia and snoring.  AHI appears to be within adequate range. I reviewed download report and original sleep study report with the patient.     He has significant tobacco use history.  He has nicotine patch prescribed. See med list.    Weight loss will be strongly beneficial to reduce the severity of sleep-disordered breathing.  Caution during activities that require prolonged concentration is strongly advised if sleepiness returns.     Follow up with Dr. Knight in 6 months.    Thank you for allowing me to participate in your patient's care.      ALIA Garcia  [facility]  Phone: [phone]      Part of this note may be an electronic transcription/translation of spoken language to printed text using the Dragon Dictation System.

## 2022-01-28 ENCOUNTER — OFFICE VISIT (OUTPATIENT)
Dept: FAMILY MEDICINE CLINIC | Facility: CLINIC | Age: 57
End: 2022-01-28

## 2022-01-28 VITALS
WEIGHT: 278 LBS | HEART RATE: 69 BPM | DIASTOLIC BLOOD PRESSURE: 72 MMHG | HEIGHT: 67 IN | BODY MASS INDEX: 43.63 KG/M2 | OXYGEN SATURATION: 94 % | SYSTOLIC BLOOD PRESSURE: 118 MMHG

## 2022-01-28 DIAGNOSIS — Z12.2 ENCOUNTER FOR SCREENING FOR LUNG CANCER: ICD-10-CM

## 2022-01-28 DIAGNOSIS — J20.9 ACUTE BRONCHITIS, UNSPECIFIED ORGANISM: Primary | ICD-10-CM

## 2022-01-28 DIAGNOSIS — F17.200 SMOKING: ICD-10-CM

## 2022-01-28 DIAGNOSIS — F17.210 CIGARETTE NICOTINE DEPENDENCE WITHOUT COMPLICATION: ICD-10-CM

## 2022-01-28 DIAGNOSIS — L60.0 INGROWN NAIL: ICD-10-CM

## 2022-01-28 PROCEDURE — 99214 OFFICE O/P EST MOD 30 MIN: CPT | Performed by: FAMILY MEDICINE

## 2022-01-28 RX ORDER — ALBUTEROL SULFATE 90 UG/1
2 AEROSOL, METERED RESPIRATORY (INHALATION) EVERY 4 HOURS PRN
Qty: 6.7 G | Refills: 12 | Status: SHIPPED | OUTPATIENT
Start: 2022-01-28

## 2022-01-28 RX ORDER — METHYLPREDNISOLONE 4 MG/1
TABLET ORAL
Qty: 21 TABLET | Refills: 0 | Status: SHIPPED | OUTPATIENT
Start: 2022-01-28 | End: 2022-05-05

## 2022-01-28 RX ORDER — CEPHALEXIN 500 MG/1
500 CAPSULE ORAL 4 TIMES DAILY
Qty: 40 CAPSULE | Refills: 0 | Status: SHIPPED | OUTPATIENT
Start: 2022-01-28 | End: 2022-05-05

## 2022-01-28 NOTE — PATIENT INSTRUCTIONS
Steps to Quit Smoking  Smoking tobacco is the leading cause of preventable death. It can affect almost every organ in the body. Smoking puts you and people around you at risk for many serious, long-lasting (chronic) diseases. Quitting smoking can be hard, but it is one of the best things that you can do for your health. It is never too late to quit.  How do I get ready to quit?  When you decide to quit smoking, make a plan to help you succeed. Before you quit:  · Pick a date to quit. Set a date within the next 2 weeks to give you time to prepare.  · Write down the reasons why you are quitting. Keep this list in places where you will see it often.  · Tell your family, friends, and co-workers that you are quitting. Their support is important.  · Talk with your doctor about the choices that may help you quit.  · Find out if your health insurance will pay for these treatments.  · Know the people, places, things, and activities that make you want to smoke (triggers). Avoid them.  What first steps can I take to quit smoking?  · Throw away all cigarettes at home, at work, and in your car.  · Throw away the things that you use when you smoke, such as ashtrays and lighters.  · Clean your car. Make sure to empty the ashtray.  · Clean your home, including curtains and carpets.  What can I do to help me quit smoking?  Talk with your doctor about taking medicines and seeing a counselor at the same time. You are more likely to succeed when you do both.  · If you are pregnant or breastfeeding, talk with your doctor about counseling or other ways to quit smoking. Do not take medicine to help you quit smoking unless your doctor tells you to do so.  To quit smoking:  Quit right away  · Quit smoking totally, instead of slowly cutting back on how much you smoke over a period of time.  · Go to counseling. You are more likely to quit if you go to counseling sessions regularly.  Take medicine  You may take medicines to help you quit. Some  medicines need a prescription, and some you can buy over-the-counter. Some medicines may contain a drug called nicotine to replace the nicotine in cigarettes. Medicines may:  · Help you to stop having the desire to smoke (cravings).  · Help to stop the problems that come when you stop smoking (withdrawal symptoms).  Your doctor may ask you to use:  · Nicotine patches, gum, or lozenges.  · Nicotine inhalers or sprays.  · Non-nicotine medicine that is taken by mouth.  Find resources  Find resources and other ways to help you quit smoking and remain smoke-free after you quit. These resources are most helpful when you use them often. They include:  · Online chats with a counselor.  · Phone quitlines.  · Printed self-help materials.  · Support groups or group counseling.  · Text messaging programs.  · Mobile phone apps. Use apps on your mobile phone or tablet that can help you stick to your quit plan. There are many free apps for mobile phones and tablets as well as websites. Examples include Quit Guide from the CDC and smokefree.gov    What things can I do to make it easier to quit?    · Talk to your family and friends. Ask them to support and encourage you.  · Call a phone quitline (7-344-QUITNOW), reach out to support groups, or work with a counselor.  · Ask people who smoke to not smoke around you.  · Avoid places that make you want to smoke, such as:  ? Bars.  ? Parties.  ? Smoke-break areas at work.  · Spend time with people who do not smoke.  · Lower the stress in your life. Stress can make you want to smoke. Try these things to help your stress:  ? Getting regular exercise.  ? Doing deep-breathing exercises.  ? Doing yoga.  ? Meditating.  ? Doing a body scan. To do this, close your eyes, focus on one area of your body at a time from head to toe. Notice which parts of your body are tense. Try to relax the muscles in those areas.  How will I feel when I quit smoking?  Day 1 to 3 weeks  Within the first 24 hours,  you may start to have some problems that come from quitting tobacco. These problems are very bad 2-3 days after you quit, but they do not often last for more than 2-3 weeks. You may get these symptoms:  · Mood swings.  · Feeling restless, nervous, angry, or annoyed.  · Trouble concentrating.  · Dizziness.  · Strong desire for high-sugar foods and nicotine.  · Weight gain.  · Trouble pooping (constipation).  · Feeling like you may vomit (nausea).  · Coughing or a sore throat.  · Changes in how the medicines that you take for other issues work in your body.  · Depression.  · Trouble sleeping (insomnia).  Week 3 and afterward  After the first 2-3 weeks of quitting, you may start to notice more positive results, such as:  · Better sense of smell and taste.  · Less coughing and sore throat.  · Slower heart rate.  · Lower blood pressure.  · Clearer skin.  · Better breathing.  · Fewer sick days.  Quitting smoking can be hard. Do not give up if you fail the first time. Some people need to try a few times before they succeed. Do your best to stick to your quit plan, and talk with your doctor if you have any questions or concerns.  Summary  · Smoking tobacco is the leading cause of preventable death. Quitting smoking can be hard, but it is one of the best things that you can do for your health.  · When you decide to quit smoking, make a plan to help you succeed.  · Quit smoking right away, not slowly over a period of time.  · When you start quitting, seek help from your doctor, family, or friends.  This information is not intended to replace advice given to you by your health care provider. Make sure you discuss any questions you have with your health care provider.  Document Revised: 09/11/2020 Document Reviewed: 03/07/2020  [employer] Patient Education © 2021 [employer]      Please review the decision aid used during our discussion regarding the Low dose lung cancer screening visit today.

## 2022-01-28 NOTE — PROGRESS NOTES
Subjective   Miles Munoz is a 56 y.o. male. Presents today for   Chief Complaint   Patient presents with   • Cough     dry        Cough  Chronicity: subacute - 4 weeks plus. The current episode started more than 1 month ago. The problem has been unchanged. Associated symptoms include wheezing. Pertinent negatives include no chills, fever, hemoptysis, nasal congestion, postnasal drip, sore throat or shortness of breath. Associated symptoms comments: No heartburn. Risk factors for lung disease include smoking/tobacco exposure (Still smoking). Treatments tried: medrol pack.   Toe Pain   The incident occurred more than 1 week ago. The incident occurred at home. There was no injury mechanism. Pain location:  b/l hallux ingrown nails. The pain is moderate. The pain has been fluctuating since onset. Pertinent negatives include no numbness or tingling. The symptoms are aggravated by palpation. He has tried nothing for the symptoms. The treatment provided no relief.     Saw sleep specialist and had pressure increased.      Order Questions    Question Answer   The patient is age 50-80: 56   The patient is a current smoker? Yes   The patient has a smoking history of 20 pack-years or greater: Yes   Actual pack - year smoking history (number): 45   Has the Patient had a Chest CT scan within the past 12 months? No   Does the patient have any clinical signs/symptoms of lung cancer? No   The patient was engaged in shared decision-making for this test: Yes   Release to patient Immediate       Review of Systems   Constitutional: Negative for chills and fever.   HENT: Negative for postnasal drip and sore throat.    Respiratory: Positive for cough and wheezing. Negative for hemoptysis and shortness of breath.    Neurological: Negative for tingling and numbness.       Patient Active Problem List   Diagnosis   • Occult blood positive stool   • Gastroesophageal reflux disease   • Tubulovillous adenoma of colon   • Multiple gastric  ulcers   • Snores   • Witnessed apneic spells   • Obesity, morbid, BMI 40.0-49.9 ([facility])   • Sleep-related bruxism   • MARIA A on CPAP   • Hypersomnia with sleep apnea   • Sleep related hypoxia   • Hyperlipidemia   • Palpitations   • Personal history of colonic polyps       Social History     Socioeconomic History   • Marital status:    Tobacco Use   • Smoking status: Current Every Day Smoker     Packs/day: 1.00     Types: Cigarettes     Start date: 10/5/1976   • Smokeless tobacco: Never Used   • Tobacco comment: close to being ready   Substance and Sexual Activity   • Alcohol use: No     Comment: occ   • Drug use: No   • Sexual activity: Defer       Allergies   Allergen Reactions   • Prednisone Other (See Comments)     Causes muscles to constrict        Current Outpatient Medications on File Prior to Visit   Medication Sig Dispense Refill   • acetaminophen (TYLENOL) 325 MG tablet Take 650 mg by mouth Every 6 (Six) Hours As Needed for Mild Pain .     • atorvastatin (LIPITOR) 20 MG tablet Take 1 tablet by mouth Daily. 90 tablet 3   • cetirizine (zyrTEC) 10 MG tablet Take 10 mg by mouth Every Morning.     • gabapentin (NEURONTIN) 300 MG capsule Take 1 capsule by mouth 2 (Two) Times a Day. 180 capsule 1   • meloxicam (MOBIC) 15 MG tablet Take 1 tablet by mouth Daily. 30 tablet 11   • nicotine (Nicoderm CQ) 14 MG/24HR patch Start after 21 mg;  Use 1 patch daily x 14 days then go to 7mg 14 patch 0   • nicotine (Nicoderm CQ) 21 MG/24HR patch 1 patch daily x 6 weeks then go to next dose 42 patch 0   • nicotine (Nicoderm CQ) 7 MG/24HR patch Start after 14mg, use 1 pack daily x 2 weeks then stop 14 patch 0   • omeprazole (priLOSEC) 40 MG capsule Take 1 capsule by mouth Daily. (Patient taking differently: Take 20 mg by mouth Every Morning.) 90 capsule 1   • sildenafil (VIAGRA) 100 MG tablet Take 100 mg by mouth Daily As Needed for erectile dysfunction. Instructed to withhold med 48 hrs prior to sx     • [DISCONTINUED]  "amoxicillin (AMOXIL) 875 MG tablet Take 1 tablet by mouth 2 (Two) Times a Day. 20 tablet 0   • [DISCONTINUED] Diclofenac Sodium (VOLTAREN) 1 % gel gel Apply 4 g topically to the appropriate area as directed 4 (Four) Times a Day As Needed (hand, knee pain). 100 g 5   • [DISCONTINUED] ferrous gluconate (FERGON) 324 MG tablet TAKE 1 TABLET BY MOUTH TWICE DAILY WITH MEALS 60 tablet 5   • [DISCONTINUED] methylPREDNISolone (MEDROL) 4 MG dose pack Take as directed on package instructions. 21 tablet 0   • [DISCONTINUED] promethazine-dextromethorphan (PROMETHAZINE-DM) 6.25-15 MG/5ML syrup Take 5 mL by mouth 4 (Four) Times a Day As Needed for Cough. 180 mL 0     No current facility-administered medications on file prior to visit.       Objective   Vitals:    01/28/22 0850   BP: 118/72   Pulse: 69   SpO2: 94%   Weight: 126 kg (278 lb)   Height: 170.2 cm (67\")     Body mass index is 43.54 kg/m².    Physical Exam  Vitals and nursing note reviewed.   Constitutional:       Appearance: He is well-developed.   HENT:      Head: Normocephalic and atraumatic.   Neck:      Thyroid: No thyromegaly.      Vascular: No JVD.   Cardiovascular:      Rate and Rhythm: Normal rate and regular rhythm.      Heart sounds: Normal heart sounds. No murmur heard.  No friction rub. No gallop.    Pulmonary:      Effort: Pulmonary effort is normal. No respiratory distress.      Breath sounds: Normal breath sounds. No wheezing or rales.   Abdominal:      General: Bowel sounds are normal. There is no distension.      Palpations: Abdomen is soft.      Tenderness: There is no abdominal tenderness. There is no guarding or rebound.   Musculoskeletal:      Cervical back: Neck supple.   Skin:     General: Skin is warm and dry.      Comments: Right side of both hallux mild ingrown, tip starting to grow out, mild redness and pain with palpation.   Neurological:      Mental Status: He is alert.   Psychiatric:         Behavior: Behavior normal.       Component      " Latest Ref Rng & Units 7/23/2021   Glucose      65 - 99 mg/dL 92   BUN      6 - 20 mg/dL 14   Creatinine      0.76 - 1.27 mg/dL 0.92   eGFR Non African Am      >60 mL/min/1.73 85   eGFR African Am      >60 mL/min/1.73 104   BUN/Creatinine Ratio      7.0 - 25.0 15.2   Sodium      136 - 145 mmol/L 145   Potassium      3.5 - 5.2 mmol/L 4.7   Chloride      98 - 107 mmol/L 109 (H)   CO2      22.0 - 29.0 mmol/L 28.8   Calcium      8.6 - 10.5 mg/dL 9.1   Total Protein      6.0 - 8.5 g/dL 6.3   Albumin      3.50 - 5.20 g/dL 3.90   Globulin      gm/dL 2.4   A/G Ratio      g/dL 1.6   Total Bilirubin      0.0 - 1.2 mg/dL 0.5   Alkaline Phosphatase      39 - 117 U/L 63   AST (SGOT)      1 - 40 U/L 19   ALT (SGPT)      1 - 41 U/L 27     Assessment/Plan   Diagnoses and all orders for this visit:    1. Acute bronchitis, unspecified organism (Primary)  -     albuterol sulfate  (90 Base) MCG/ACT inhaler; Inhale 2 puffs Every 4 (Four) Hours As Needed for Wheezing or Shortness of Air.  Dispense: 6.7 g; Refill: 12  -     methylPREDNISolone (MEDROL) 4 MG dose pack; Take as directed on package instructions.  Dispense: 21 tablet; Refill: 0    2. Ingrown nail - bilateral hallux  -     cephalexin (KEFLEX) 500 MG capsule; Take 1 capsule by mouth 4 (Four) Times a Day.  Dispense: 40 capsule; Refill: 0  -     mupirocin (BACTROBAN) 2 % ointment; Apply 1 application topically to the appropriate area as directed 2 (Two) Times a Day. To both great toes  Dispense: 30 g; Refill: 0    3. Cigarette nicotine dependence without complication  -     CT chest low dose wo; Future    4. Smoking  -     CT chest low dose wo; Future    5. Encounter for screening for lung cancer  -     CT chest low dose wo; Future    highly recommend quit smking  Cephalexin fo ringrown nails  Will give inhaler and start steroid pakc for cough;  Try quit smoking.    Patient had cxr as requested by insuranc eprior to lung cancer screening noted scarring;  Reports todl by  insurance has to go to [facility] for this and will need to reorder.           -Follow up: 3 months and prn

## 2022-02-20 DIAGNOSIS — M54.50 CHRONIC BILATERAL LOW BACK PAIN WITHOUT SCIATICA: ICD-10-CM

## 2022-02-20 DIAGNOSIS — G89.29 CHRONIC BILATERAL LOW BACK PAIN WITHOUT SCIATICA: ICD-10-CM

## 2022-02-20 DIAGNOSIS — G56.03 BILATERAL CARPAL TUNNEL SYNDROME: ICD-10-CM

## 2022-02-21 RX ORDER — GABAPENTIN 300 MG/1
CAPSULE ORAL
Qty: 180 CAPSULE | Refills: 1 | Status: SHIPPED | OUTPATIENT
Start: 2022-02-21 | End: 2022-08-09

## 2022-03-09 ENCOUNTER — TELEPHONE (OUTPATIENT)
Dept: FAMILY MEDICINE CLINIC | Facility: CLINIC | Age: 57
End: 2022-03-09

## 2022-03-09 RX ORDER — TAMSULOSIN HYDROCHLORIDE 0.4 MG/1
1 CAPSULE ORAL DAILY
Qty: 14 CAPSULE | Refills: 0 | Status: SHIPPED | OUTPATIENT
Start: 2022-03-09 | End: 2022-03-23 | Stop reason: SDUPTHER

## 2022-03-09 RX ORDER — DOXYCYCLINE HYCLATE 100 MG/1
100 CAPSULE ORAL 2 TIMES DAILY
Qty: 28 CAPSULE | Refills: 0 | Status: SHIPPED | OUTPATIENT
Start: 2022-03-09 | End: 2022-05-05

## 2022-03-09 NOTE — TELEPHONE ENCOUNTER
Pt thinks he has UTI. He has slow urine stream and burns with urination. He asked if you will send med to pharmacy.

## 2022-03-23 RX ORDER — TAMSULOSIN HYDROCHLORIDE 0.4 MG/1
1 CAPSULE ORAL DAILY
Qty: 90 CAPSULE | Refills: 1 | Status: SHIPPED | OUTPATIENT
Start: 2022-03-23 | End: 2023-03-06 | Stop reason: SDUPTHER

## 2022-05-05 ENCOUNTER — TELEPHONE (OUTPATIENT)
Dept: FAMILY MEDICINE CLINIC | Facility: CLINIC | Age: 57
End: 2022-05-05

## 2022-05-05 ENCOUNTER — OFFICE VISIT (OUTPATIENT)
Dept: FAMILY MEDICINE CLINIC | Facility: CLINIC | Age: 57
End: 2022-05-05

## 2022-05-05 VITALS
DIASTOLIC BLOOD PRESSURE: 86 MMHG | OXYGEN SATURATION: 99 % | HEIGHT: 67 IN | HEART RATE: 67 BPM | TEMPERATURE: 97.3 F | RESPIRATION RATE: 20 BRPM | BODY MASS INDEX: 44.42 KG/M2 | SYSTOLIC BLOOD PRESSURE: 138 MMHG | WEIGHT: 283 LBS

## 2022-05-05 DIAGNOSIS — M54.50 CHRONIC BILATERAL LOW BACK PAIN WITHOUT SCIATICA: ICD-10-CM

## 2022-05-05 DIAGNOSIS — M72.2 PLANTAR FASCIITIS, BILATERAL: Primary | ICD-10-CM

## 2022-05-05 DIAGNOSIS — G89.29 CHRONIC BILATERAL LOW BACK PAIN WITHOUT SCIATICA: ICD-10-CM

## 2022-05-05 PROCEDURE — 99213 OFFICE O/P EST LOW 20 MIN: CPT | Performed by: FAMILY MEDICINE

## 2022-05-05 PROCEDURE — 20550 NJX 1 TENDON SHEATH/LIGAMENT: CPT | Performed by: FAMILY MEDICINE

## 2022-05-05 RX ORDER — TRIAMCINOLONE ACETONIDE 40 MG/ML
40 INJECTION, SUSPENSION INTRA-ARTICULAR; INTRAMUSCULAR ONCE
Status: COMPLETED | OUTPATIENT
Start: 2022-05-05 | End: 2022-05-05

## 2022-05-05 RX ORDER — LIDOCAINE HYDROCHLORIDE 20 MG/ML
0.5 INJECTION, SOLUTION INFILTRATION; PERINEURAL ONCE
Status: COMPLETED | OUTPATIENT
Start: 2022-05-05 | End: 2022-05-05

## 2022-05-05 RX ORDER — DICLOFENAC EPOLAMINE 0.01 G/1
1 SYSTEM TOPICAL 2 TIMES DAILY PRN
Qty: 60 PATCH | Refills: 5 | Status: SHIPPED | OUTPATIENT
Start: 2022-05-05

## 2022-05-05 RX ADMIN — LIDOCAINE HYDROCHLORIDE 0.5 ML: 20 INJECTION, SOLUTION INFILTRATION; PERINEURAL at 10:10

## 2022-05-05 RX ADMIN — TRIAMCINOLONE ACETONIDE 40 MG: 40 INJECTION, SUSPENSION INTRA-ARTICULAR; INTRAMUSCULAR at 10:12

## 2022-05-05 RX ADMIN — LIDOCAINE HYDROCHLORIDE 0.5 ML: 20 INJECTION, SOLUTION INFILTRATION; PERINEURAL at 10:11

## 2022-05-05 RX ADMIN — TRIAMCINOLONE ACETONIDE 40 MG: 40 INJECTION, SUSPENSION INTRA-ARTICULAR; INTRAMUSCULAR at 10:11

## 2022-05-05 NOTE — PROGRESS NOTES
Subjective   Miles Munoz is a 56 y.o. male. Presents today for   Chief Complaint   Patient presents with   • plantar fasiciatis      Bilateral feet    • Back Pain       Back Pain  This is a chronic problem. The current episode started more than 1 month ago. The problem occurs intermittently. The problem has been waxing and waning since onset. The pain is present in the lumbar spine. The pain is mild. He has tried analgesics for the symptoms. The treatment provided mild relief.   Foot Injury   Incident onset: no injury, has plantar facsiitis. Treatments tried: would like injections as failing orals and conservative measures.       Review of Systems   Musculoskeletal: Positive for back pain.       Patient Active Problem List   Diagnosis   • Occult blood positive stool   • Gastroesophageal reflux disease   • Tubulovillous adenoma of colon   • Multiple gastric ulcers   • Snores   • Witnessed apneic spells   • Obesity, morbid, BMI 40.0-49.9 ([facility])   • Sleep-related bruxism   • MARIA A on CPAP   • Hypersomnia with sleep apnea   • Sleep related hypoxia   • Hyperlipidemia   • Palpitations   • Personal history of colonic polyps       Social History     Socioeconomic History   • Marital status:    Tobacco Use   • Smoking status: Current Every Day Smoker     Packs/day: 1.00     Years: 25.00     Pack years: 25.00     Types: Cigarettes     Start date: 10/5/1976   • Smokeless tobacco: Never Used   • Tobacco comment: close to being ready   Vaping Use   • Vaping Use: Never used   Substance and Sexual Activity   • Alcohol use: No     Comment: occ   • Drug use: No   • Sexual activity: Defer       Allergies   Allergen Reactions   • Prednisone Other (See Comments)     Causes muscles to constrict        Current Outpatient Medications on File Prior to Visit   Medication Sig Dispense Refill   • albuterol sulfate  (90 Base) MCG/ACT inhaler Inhale 2 puffs Every 4 (Four) Hours As Needed for Wheezing or Shortness of Air. 6.7 g 12    • atorvastatin (LIPITOR) 20 MG tablet Take 1 tablet by mouth Daily. 90 tablet 3   • cetirizine (zyrTEC) 10 MG tablet Take 10 mg by mouth Every Morning.     • gabapentin (NEURONTIN) 300 MG capsule Take 1 capsule by mouth twice daily 180 capsule 1   • meloxicam (MOBIC) 15 MG tablet Take 1 tablet by mouth Daily. 30 tablet 11   • mupirocin (BACTROBAN) 2 % ointment Apply 1 application topically to the appropriate area as directed 2 (Two) Times a Day. To both great toes 30 g 0   • nicotine (Nicoderm CQ) 14 MG/24HR patch Start after 21 mg;  Use 1 patch daily x 14 days then go to 7mg 14 patch 0   • nicotine (Nicoderm CQ) 21 MG/24HR patch 1 patch daily x 6 weeks then go to next dose 42 patch 0   • nicotine (Nicoderm CQ) 7 MG/24HR patch Start after 14mg, use 1 pack daily x 2 weeks then stop 14 patch 0   • omeprazole (priLOSEC) 40 MG capsule Take 1 capsule by mouth Daily. (Patient taking differently: Take 20 mg by mouth Every Morning.) 90 capsule 1   • sildenafil (VIAGRA) 100 MG tablet Take 100 mg by mouth Daily As Needed for erectile dysfunction. Instructed to withhold med 48 hrs prior to sx     • tamsulosin (FLOMAX) 0.4 MG capsule 24 hr capsule Take 1 capsule by mouth Daily. 90 capsule 1   • [DISCONTINUED] acetaminophen (TYLENOL) 325 MG tablet Take 650 mg by mouth Every 6 (Six) Hours As Needed for Mild Pain .     • [DISCONTINUED] cephalexin (KEFLEX) 500 MG capsule Take 1 capsule by mouth 4 (Four) Times a Day. 40 capsule 0   • [DISCONTINUED] doxycycline (VIBRAMYCIN) 100 MG capsule Take 1 capsule by mouth 2 (Two) Times a Day. 28 capsule 0   • [DISCONTINUED] methylPREDNISolone (MEDROL) 4 MG dose pack Take as directed on package instructions. 21 tablet 0     No current facility-administered medications on file prior to visit.       Objective   Vitals:    05/05/22 0902   BP: 138/86   BP Location: Right arm   Patient Position: Sitting   Cuff Size: Adult   Pulse: 67   Resp: 20   Temp: 97.3 °F (36.3 °C)   TempSrc: Temporal   SpO2:  "99%   Weight: 128 kg (283 lb)   Height: 170.2 cm (67\")   PainSc:   9     Body mass index is 44.32 kg/m².    Physical Exam  Vitals and nursing note reviewed.   Constitutional:       Appearance: He is well-developed.   Musculoskeletal:      Cervical back: Neck supple.   Skin:     General: Skin is warm and dry.   Neurological:      Mental Status: He is alert.   Psychiatric:         Behavior: Behavior normal.     Injection Tendon or Ligament right plantar    Date/Time: 5/5/2022 9:50 AM  Performed by: Aly Espinosa DO  Authorized by: Aly Espinosa DO   Consent: Verbal consent obtained.  Risks and benefits: risks, benefits and alternatives were discussed  Consent given by: patient  Patient understanding: patient states understanding of the procedure being performed  Site marked: the operative site was marked  Required items: required blood products, implants, devices, and special equipment available  Patient identity confirmed: verbally with patient  Preparation: Patient was prepped and draped in the usual sterile fashion.  Local anesthesia used: yes    Anesthesia:  Local anesthesia used: yes  Local Anesthetic: topical anesthetic    Sedation:  Patient sedated: no    Patient tolerance: Patient tolerated the procedure well with no immediate complications    Injection Tendon or Ligament left plantar fascia    Date/Time: 5/5/2022 9:50 AM  Performed by: Aly Espinosa DO  Authorized by: Aly Espinosa DO   Consent: Verbal consent obtained.  Risks and benefits: risks, benefits and alternatives were discussed  Consent given by: patient  Patient understanding: patient states understanding of the procedure being performed  Site marked: the operative site was marked  Required items: required blood products, implants, devices, and special equipment available  Patient identity confirmed: verbally with patient  Preparation: Patient was prepped and draped in the usual sterile fashion.  Local anesthesia used: " yes    Anesthesia:  Local anesthesia used: yes  Local Anesthetic: topical anesthetic    Sedation:  Patient sedated: no    Patient tolerance: Patient tolerated the procedure well with no immediate complications            Assessment/Plan   Diagnoses and all orders for this visit:    1. Plantar fasciitis, bilateral (Primary)  -     triamcinolone acetonide (KENALOG-40) injection 40 mg  -     triamcinolone acetonide (KENALOG-40) injection 40 mg  -     lidocaine (XYLOCAINE) 2% injection 0.5 mL  -     lidocaine (XYLOCAINE) 2% injection 0.5 mL  -     Injection Tendon or Ligament  -     Injection Tendon or Ligament    2. Chronic bilateral low back pain without sciatica  -     Diclofenac Epolamine (FLECTOR) 1.3 % patch patch; Apply 1 patch topically to the appropriate area as directed 2 (Two) Times a Day As Needed (back pain).  Dispense: 60 patch; Refill: 5    recommend ice and stretch feet  Trial patches for back pain  Never heard on CT rsults, had staff call for copy of report  Almost ready to quit smoking and highly recommend         -Follow up: Prn - RTC if worse or no improvement.

## 2022-07-15 ENCOUNTER — OFFICE VISIT (OUTPATIENT)
Dept: SLEEP MEDICINE | Facility: HOSPITAL | Age: 57
End: 2022-07-15

## 2022-07-15 VITALS
SYSTOLIC BLOOD PRESSURE: 149 MMHG | OXYGEN SATURATION: 95 % | HEART RATE: 71 BPM | HEIGHT: 67 IN | BODY MASS INDEX: 42.22 KG/M2 | WEIGHT: 269 LBS | DIASTOLIC BLOOD PRESSURE: 78 MMHG

## 2022-07-15 DIAGNOSIS — E66.01 OBESITY, MORBID, BMI 40.0-49.9: ICD-10-CM

## 2022-07-15 DIAGNOSIS — Z78.9 DIFFICULTY WITH CPAP USE: ICD-10-CM

## 2022-07-15 DIAGNOSIS — G47.33 OBSTRUCTIVE SLEEP APNEA: Primary | ICD-10-CM

## 2022-07-15 PROCEDURE — G0463 HOSPITAL OUTPT CLINIC VISIT: HCPCS

## 2022-07-15 NOTE — PROGRESS NOTES
[facility] SLEEP MEDICINE  [address]  [address]  [place] [address] [phone]    PCP: Aly Espinosa DO    Reason for visit:  Sleep disorders: MARIA A    Miles is a 56 y.o.male who was seen in the Sleep Disorders Center today. Regular fu. He sleeps from 10pm to 6am. Has not received new Maurizio device yet. He uses ffm, fits well, no air leaks, occasional dry mouth. He feels the pressure is too low. Sometimes rested when he awakens.  [place] Sleepiness Scale is 7. Caffeine 1-2 per day. Alcohol 0 per week.    Miles  reports that he has been smoking cigarettes. He started smoking about 45 years ago. He has a 25.00 pack-year smoking history. He has never used smokeless tobacco.    Pertinent Positive Review of Systems of acid reflux  Rest of Review of Systems was negative as recorded in Sleep Questionnaire.    Patient  has a past medical history of Allergic rhinitis, Arthritis, Back pain, GERD (gastroesophageal reflux disease), Hyperlipidemia, Renal stone, Seasonal allergies, and Sleep apnea.     Current Medications:    Current Outpatient Medications:   •  albuterol sulfate  (90 Base) MCG/ACT inhaler, Inhale 2 puffs Every 4 (Four) Hours As Needed for Wheezing or Shortness of Air., Disp: 6.7 g, Rfl: 12  •  atorvastatin (LIPITOR) 20 MG tablet, Take 1 tablet by mouth Daily., Disp: 90 tablet, Rfl: 3  •  cetirizine (zyrTEC) 10 MG tablet, Take 10 mg by mouth Every Morning., Disp: , Rfl:   •  Diclofenac Epolamine (FLECTOR) 1.3 % patch patch, Apply 1 patch topically to the appropriate area as directed 2 (Two) Times a Day As Needed (back pain)., Disp: 60 patch, Rfl: 5  •  gabapentin (NEURONTIN) 300 MG capsule, Take 1 capsule by mouth twice daily, Disp: 180 capsule, Rfl: 1  •  meloxicam (MOBIC) 15 MG tablet, Take 1 tablet by mouth Daily., Disp: 30 tablet, Rfl: 11  •  mupirocin (BACTROBAN) 2 % ointment, Apply 1 application topically to the appropriate area as directed 2 (Two) Times a Day. To  "both great toes, Disp: 30 g, Rfl: 0  •  nicotine (Nicoderm CQ) 14 MG/24HR patch, Start after 21 mg;  Use 1 patch daily x 14 days then go to 7mg, Disp: 14 patch, Rfl: 0  •  nicotine (Nicoderm CQ) 21 MG/24HR patch, 1 patch daily x 6 weeks then go to next dose, Disp: 42 patch, Rfl: 0  •  nicotine (Nicoderm CQ) 7 MG/24HR patch, Start after 14mg, use 1 pack daily x 2 weeks then stop, Disp: 14 patch, Rfl: 0  •  omeprazole (priLOSEC) 40 MG capsule, Take 1 capsule by mouth Daily. (Patient taking differently: Take 20 mg by mouth Every Morning.), Disp: 90 capsule, Rfl: 1  •  sildenafil (VIAGRA) 100 MG tablet, Take 100 mg by mouth Daily As Needed for erectile dysfunction. Instructed to withhold med 48 hrs prior to sx, Disp: , Rfl:   •  tamsulosin (FLOMAX) 0.4 MG capsule 24 hr capsule, Take 1 capsule by mouth Daily., Disp: 90 capsule, Rfl: 1   also entered in Sleep Questionnaire         Vital Signs: /78   Pulse 71   Ht 170.2 cm (67\")   Wt 122 kg (269 lb)   SpO2 95%   BMI 42.13 kg/m²     Body mass index is 42.13 kg/m².       Tongue: Large       Dentition: good       Pharynx: Posterior pharyngeal pillars are unable to see   Mallampatti: IV (only hard palate visible)        General: Alert. Cooperative. Well developed. No acute distress.             Head:  Normocephalic. Symmetrical. Atraumatic.              Nose: No septal deviation. No drainage.          Throat: No oral lesions. No thrush. Moist mucous membranes.    Chest Wall:  Normal shape. Symmetric expansion with respiration. No tenderness.             Neck:  Trachea midline.           Lungs:  Clear to auscultation bilaterally. No wheezes. No rhonchi. No rales. Respirations regular, even and unlabored.            Heart:  Regular rhythm and normal rate. Normal S1 and S2. No murmur.     Abdomen:  Soft, non-tender and non-distended. Normal bowel sounds. No masses.  Extremities:  Moves all extremities well. No edema.    Psychiatric: Normal mood and " affect.    Diagnostic data available to date is as below and was reviewed on current visit:  · HST 3/13/2018:   This is a home sleep study done on 3/13/18, total recording time was 423.5 minutes with total monitoring time of 415.0 minutes.  Respiratory summary: Severe MARIA A with AHI of 30.7 with positional component pain worst in the left position with AHI of 68.7.  Oxygenation: Significant hypoxemia was bozena desaturation to 75% with 41.5 minutes spent in the hypoxemic range.  Patient had clusters of significant hypoxemia that are likely to be REM sleep related however no way to confirm without the EEG recording  Snoring: Patient had severe snoring at 75.9% of total recording time  Recommendations: auto CPAP 8-20 cmH20 and repeat overnight oximetry on CPAP.   · Overnight oximetry on CPAP  room air done on 6/14/18  Patient had oxygenation in the lower 90s but still within normal range most of the night with only 0.6 minutes with sats below or equal to 88%  There is rare scattered desaturation with overall acceptable profile with no significant hypoxemia or clustering of desaturations    Most current available usage data reviewed on 07/15/2022:  · 99% compliance average 7 hours 46 minutes AHI 0.3 average pressure 14 cm    DME Company: [employer]    Prescription to [facility] for replacement supplies as below:    full face mask      Description Replacement    Nasal PILLOWS      A 7034 Nasal Pillows  every 3 mth    A 7033 Repl Nasal Pillows  2 per mth    Nasal MASK/CUSHION      A 7034 Nasal Mask/Cushion  every 3 mth    A 7032 Repl Nasal Mask/Cushion  2 per mth    Full Face MASK     x A 7030 Full Face Mask  every 3 mth   x A 7031 Repl Face Mask  1 per mth      A 4604 Heated Tubing  every 3 mth    A 7037 Standard Tubing  every 3 mth   x A 7035 Headgear  every 3 mth   x A 7046 Repl Humidifier Chamber  every 6 yrs   x A 7038 Disposable Filters  2 per mth   x A 7039 Non-disposable Filter  every 6 mth   x A 7036 Chin Strap  every 6 mth          No orders of the defined types were placed in this encounter.         Impression:  1. Obstructive sleep apnea    2. Obesity, morbid, BMI 40.0-49.9 (HCC)    3. Difficulty with CPAP use        Plan:  Miles is compliant but feels pressures low - change to auto 15-20 and remove the ramp function.  Asked to call if new pressures are uncomfortable.    Pending recall from Maurizio.    I reiterated the importance of effective treatment of obstructive sleep apnea with PAP machine.  Cardiovascular health risks of untreated sleep apnea were again reviewed.  Patient was asked to remain cautious if there is persistent hypersomnolence. The benefit of weight loss in reducing severity of obstructive sleep apnea was discussed.  Patient would benefit from adhering to a strict diet to achieve ideal BMI.     Change of PAP supplies regularly is important for effective use.  Change of cushion on the mask or plugs on nasal pillows along with disposable filters once every month and change of mask frame, tubing, headgear and Velcro straps every 6 months at the minimum was reiterated.    This patient is compliant with PAP machine and benefits from its use.  Apnea hypopneas index is corrected/improved.  Daytime hypersomnolence has resolved.     Patient will follow up in this clinic in 1 year APRN    Thank you for allowing me to participate in your patient's care.    Electronically signed by Ramo Knight MD, 07/15/22, 12:22 PM EDT.    Part of this note may be an electronic transcription/translation of spoken language to printed text using the Dragon Dictation System.

## 2022-08-09 DIAGNOSIS — M54.50 CHRONIC BILATERAL LOW BACK PAIN WITHOUT SCIATICA: ICD-10-CM

## 2022-08-09 DIAGNOSIS — G89.29 CHRONIC BILATERAL LOW BACK PAIN WITHOUT SCIATICA: ICD-10-CM

## 2022-08-09 DIAGNOSIS — G56.03 BILATERAL CARPAL TUNNEL SYNDROME: ICD-10-CM

## 2022-08-09 RX ORDER — ATORVASTATIN CALCIUM 20 MG/1
TABLET, FILM COATED ORAL
Qty: 90 TABLET | Refills: 0 | Status: SHIPPED | OUTPATIENT
Start: 2022-08-09 | End: 2022-11-14

## 2022-08-09 RX ORDER — GABAPENTIN 300 MG/1
CAPSULE ORAL
Qty: 180 CAPSULE | Refills: 0 | Status: SHIPPED | OUTPATIENT
Start: 2022-08-09 | End: 2023-02-04 | Stop reason: SDUPTHER

## 2022-09-01 DIAGNOSIS — M15.9 PRIMARY OSTEOARTHRITIS INVOLVING MULTIPLE JOINTS: ICD-10-CM

## 2022-09-01 DIAGNOSIS — M54.50 CHRONIC BILATERAL LOW BACK PAIN WITHOUT SCIATICA: ICD-10-CM

## 2022-09-01 DIAGNOSIS — G89.29 CHRONIC BILATERAL LOW BACK PAIN WITHOUT SCIATICA: ICD-10-CM

## 2022-09-01 RX ORDER — MELOXICAM 15 MG/1
TABLET ORAL
Qty: 30 TABLET | Refills: 2 | Status: SHIPPED | OUTPATIENT
Start: 2022-09-01 | End: 2022-11-14

## 2022-11-14 DIAGNOSIS — G56.03 BILATERAL CARPAL TUNNEL SYNDROME: ICD-10-CM

## 2022-11-14 DIAGNOSIS — G89.29 CHRONIC BILATERAL LOW BACK PAIN WITHOUT SCIATICA: ICD-10-CM

## 2022-11-14 DIAGNOSIS — M54.50 CHRONIC BILATERAL LOW BACK PAIN WITHOUT SCIATICA: ICD-10-CM

## 2022-11-14 DIAGNOSIS — M15.9 PRIMARY OSTEOARTHRITIS INVOLVING MULTIPLE JOINTS: ICD-10-CM

## 2022-11-14 RX ORDER — MELOXICAM 15 MG/1
15 TABLET ORAL DAILY
Qty: 30 TABLET | Refills: 0 | Status: SHIPPED | OUTPATIENT
Start: 2022-11-14 | End: 2023-02-04 | Stop reason: SDUPTHER

## 2022-11-14 RX ORDER — ATORVASTATIN CALCIUM 20 MG/1
20 TABLET, FILM COATED ORAL DAILY
Qty: 30 TABLET | Refills: 0 | Status: SHIPPED | OUTPATIENT
Start: 2022-11-14 | End: 2023-02-04 | Stop reason: SDUPTHER

## 2022-11-14 RX ORDER — GABAPENTIN 300 MG/1
CAPSULE ORAL
Qty: 180 CAPSULE | Refills: 0 | OUTPATIENT
Start: 2022-11-14

## 2022-11-29 NOTE — TELEPHONE ENCOUNTER
PT ASKING TO BE SEEN SINCE HE NEEDS MEDS REFILLED. NO OPENINGS FOR [facility] [place] APPT.    HAD TO CANCEL LAST APPT SINCE HE COULD NOT GET OFF WORK    PH# [phone]

## 2022-11-29 NOTE — TELEPHONE ENCOUNTER
Please call to schedule appt and then I will send meds to pharmacy. Please clarify what meds he needs refilled and what pharmacy.

## 2022-11-29 NOTE — TELEPHONE ENCOUNTER
meloxicam  meloxicam (MOBIC) 15 MG tablet  atorvastatin  atorvastatin (LIPITOR) 20 MG tablet    PER GABBY PT IS TO BE SCHEDULED SO PT CAN GET REFILLS. SCHEDULED FOR 2/2/23    [facility]

## 2023-01-17 ENCOUNTER — CLINICAL SUPPORT (OUTPATIENT)
Dept: FAMILY MEDICINE CLINIC | Facility: CLINIC | Age: 58
End: 2023-01-17
Payer: COMMERCIAL

## 2023-01-17 DIAGNOSIS — J02.9 SORE THROAT: Primary | ICD-10-CM

## 2023-01-17 LAB
EXPIRATION DATE: NORMAL
INTERNAL CONTROL: NORMAL
Lab: NORMAL
S PYO AG THROAT QL: NEGATIVE

## 2023-01-17 PROCEDURE — 87880 STREP A ASSAY W/OPTIC: CPT | Performed by: FAMILY MEDICINE

## 2023-02-02 ENCOUNTER — OFFICE VISIT (OUTPATIENT)
Dept: FAMILY MEDICINE CLINIC | Facility: CLINIC | Age: 58
End: 2023-02-02
Payer: COMMERCIAL

## 2023-02-02 VITALS
OXYGEN SATURATION: 96 % | SYSTOLIC BLOOD PRESSURE: 140 MMHG | WEIGHT: 278 LBS | HEIGHT: 67 IN | DIASTOLIC BLOOD PRESSURE: 70 MMHG | HEART RATE: 87 BPM | BODY MASS INDEX: 43.63 KG/M2

## 2023-02-02 DIAGNOSIS — E78.2 MIXED HYPERLIPIDEMIA: ICD-10-CM

## 2023-02-02 DIAGNOSIS — G47.33 OSA ON CPAP: ICD-10-CM

## 2023-02-02 DIAGNOSIS — N40.0 BENIGN PROSTATIC HYPERPLASIA WITHOUT LOWER URINARY TRACT SYMPTOMS: ICD-10-CM

## 2023-02-02 DIAGNOSIS — E66.01 MORBID (SEVERE) OBESITY DUE TO EXCESS CALORIES: ICD-10-CM

## 2023-02-02 DIAGNOSIS — Z00.00 WELLNESS EXAMINATION: Primary | ICD-10-CM

## 2023-02-02 DIAGNOSIS — Z99.89 OSA ON CPAP: ICD-10-CM

## 2023-02-02 PROBLEM — Q66.70 CONGENITAL PES CAVUS: Status: ACTIVE | Noted: 2023-02-02

## 2023-02-02 PROBLEM — M72.2 PLANTAR FASCIAL FIBROMATOSIS: Status: ACTIVE | Noted: 2023-02-02

## 2023-02-02 PROBLEM — M77.30 CALCANEAL SPUR: Status: ACTIVE | Noted: 2023-02-02

## 2023-02-02 PROBLEM — M79.609 PAIN IN LIMB: Status: ACTIVE | Noted: 2023-02-02

## 2023-02-02 PROCEDURE — 99396 PREV VISIT EST AGE 40-64: CPT | Performed by: FAMILY MEDICINE

## 2023-02-02 RX ORDER — SEMAGLUTIDE 2.4 MG/.75ML
2.4 INJECTION, SOLUTION SUBCUTANEOUS WEEKLY
Qty: 6 ML | Refills: 2 | Status: SHIPPED | OUTPATIENT
Start: 2023-02-02

## 2023-02-02 RX ORDER — SEMAGLUTIDE 1.7 MG/.75ML
1.7 INJECTION, SOLUTION SUBCUTANEOUS WEEKLY
Qty: 2 ML | Refills: 0 | Status: SHIPPED | OUTPATIENT
Start: 2023-02-02

## 2023-02-02 RX ORDER — SEMAGLUTIDE 1 MG/.5ML
1 INJECTION, SOLUTION SUBCUTANEOUS WEEKLY
Qty: 2 ML | Refills: 0 | Status: SHIPPED | OUTPATIENT
Start: 2023-02-02

## 2023-02-02 RX ORDER — SEMAGLUTIDE 0.5 MG/.5ML
0.5 INJECTION, SOLUTION SUBCUTANEOUS WEEKLY
Qty: 2 ML | Refills: 0 | Status: SHIPPED | OUTPATIENT
Start: 2023-02-02 | End: 2023-02-03 | Stop reason: SDUPTHER

## 2023-02-02 NOTE — PROGRESS NOTES
Subjective   Miles Munoz is a 57 y.o. male. Presents today for   Chief Complaint   Patient presents with   • Annual Exam   • Lung Cancer Screening     Need orders     • Hyperlipidemia       History of Present Illness  Patient here for wellness exam;  Doing well, quit smoking 22.  No cp/soa;  Reports since quit smoking can smell and taste again.   No cp/soa;    Feels like recurrent thrush at times, needs refill nystatin.    Review of Systems   Respiratory: Negative for shortness of breath.    Cardiovascular: Negative for chest pain and palpitations.   Gastrointestinal: Negative for abdominal pain, nausea and vomiting.       Patient Active Problem List   Diagnosis   • Occult blood positive stool   • Gastroesophageal reflux disease   • Tubulovillous adenoma of colon   • Multiple gastric ulcers   • Snores   • Witnessed apneic spells   • Obesity, morbid, BMI 40.0-49.9 ([facility])   • Sleep-related bruxism   • MARIA A on CPAP   • Hypersomnia with sleep apnea   • Sleep related hypoxia   • Hyperlipidemia   • Palpitations   • Personal history of colonic polyps   • Calcaneal spur   • Congenital pes cavus   • Pain in limb   • Plantar fascial fibromatosis       Social History     Socioeconomic History   • Marital status:    Tobacco Use   • Smoking status: Former     Packs/day: 1.00     Years: 25.00     Pack years: 25.00     Types: Cigarettes     Start date: 10/5/1976     Quit date: 2022     Years since quittin.0   • Smokeless tobacco: Never   • Tobacco comments:     close to being ready   Vaping Use   • Vaping Use: Never used   Substance and Sexual Activity   • Alcohol use: No     Comment: occ   • Drug use: No   • Sexual activity: Defer       Allergies   Allergen Reactions   • Prednisone Other (See Comments)     Causes muscles to constrict        Current Outpatient Medications on File Prior to Visit   Medication Sig Dispense Refill   • albuterol sulfate  (90 Base) MCG/ACT inhaler Inhale 2 puffs Every 4  "(Four) Hours As Needed for Wheezing or Shortness of Air. 6.7 g 12   • atorvastatin (LIPITOR) 20 MG tablet Take 1 tablet by mouth Daily. PLEASE CALL THE OFFICE FOR AN APPT 30 tablet 0   • cetirizine (zyrTEC) 10 MG tablet Take 10 mg by mouth Every Morning.     • Diclofenac Epolamine (FLECTOR) 1.3 % patch patch Apply 1 patch topically to the appropriate area as directed 2 (Two) Times a Day As Needed (back pain). 60 patch 5   • gabapentin (NEURONTIN) 300 MG capsule Take 1 capsule by mouth twice daily 180 capsule 0   • meloxicam (MOBIC) 15 MG tablet Take 1 tablet by mouth Daily. PLEASE CALL THE OFFICE FOR AN APPT 30 tablet 0   • nicotine (Nicoderm CQ) 21 MG/24HR patch 1 patch daily x 6 weeks then go to next dose 42 patch 0   • omeprazole (priLOSEC) 40 MG capsule Take 1 capsule by mouth Daily. (Patient taking differently: Take 20 mg by mouth Every Morning.) 90 capsule 1   • sildenafil (VIAGRA) 100 MG tablet Take 100 mg by mouth Daily As Needed for erectile dysfunction. Instructed to withhold med 48 hrs prior to sx     • tamsulosin (FLOMAX) 0.4 MG capsule 24 hr capsule Take 1 capsule by mouth Daily. 90 capsule 1   • nicotine (Nicoderm CQ) 14 MG/24HR patch Start after 21 mg;  Use 1 patch daily x 14 days then go to 7mg 14 patch 0   • nicotine (Nicoderm CQ) 7 MG/24HR patch Start after 14mg, use 1 pack daily x 2 weeks then stop 14 patch 0   • [DISCONTINUED] mupirocin (BACTROBAN) 2 % ointment Apply 1 application topically to the appropriate area as directed 2 (Two) Times a Day. To both great toes 30 g 0     No current facility-administered medications on file prior to visit.       Objective   Vitals:    02/02/23 1448   BP: 140/70   Pulse: 87   SpO2: 96%   Weight: 126 kg (278 lb)   Height: 170.2 cm (67\")     Body mass index is 43.54 kg/m².    Physical Exam  Vitals and nursing note reviewed.   Constitutional:       Appearance: He is well-developed.   HENT:      Head: Normocephalic and atraumatic.      Mouth/Throat:      Comments: " Whitish material.  Neck:      Thyroid: No thyromegaly.      Vascular: No JVD.   Cardiovascular:      Rate and Rhythm: Normal rate and regular rhythm.      Heart sounds: Normal heart sounds. No murmur heard.    No friction rub. No gallop.   Pulmonary:      Effort: Pulmonary effort is normal. No respiratory distress.      Breath sounds: Normal breath sounds. No wheezing or rales.   Abdominal:      General: Bowel sounds are normal. There is no distension.      Palpations: Abdomen is soft.      Tenderness: There is no abdominal tenderness. There is no guarding or rebound.   Musculoskeletal:      Cervical back: Neck supple.   Skin:     General: Skin is warm and dry.   Neurological:      Mental Status: He is alert.   Psychiatric:         Behavior: Behavior normal.         Assessment & Plan   Diagnoses and all orders for this visit:    1. Wellness examination (Primary)    2. Mixed hyperlipidemia  -     Comprehensive Metabolic Panel  -     Lipid Panel    3. MARIA A on CPAP    4. Morbid (severe) obesity due to excess calories (HCC)  -     Semaglutide-Weight Management (Wegovy) 0.5 MG/0.5ML solution auto-injector; Inject 0.5 mL under the skin into the appropriate area as directed 1 (One) Time Per Week. X 4 weeks then go to 1mg if tolerating  Dispense: 2 mL; Refill: 0  -     Semaglutide-Weight Management (Wegovy) 1 MG/0.5ML solution auto-injector; Inject 1 mg under the skin into the appropriate area as directed 1 (One) Time Per Week. X 4 weeks then go to 1.7mg  Dispense: 2 mL; Refill: 0  -     Semaglutide-Weight Management (Wegovy) 1.7 MG/0.75ML solution auto-injector; Inject 0.75 mL under the skin into the appropriate area as directed 1 (One) Time Per Week. X 4 weeks then go to 2.4mg if tolerates  Dispense: 2 mL; Refill: 0  -     Semaglutide-Weight Management (Wegovy) 2.4 MG/0.75ML solution auto-injector; Inject 2.4 mg under the skin into the appropriate area as directed 1 (One) Time Per Week.  Dispense: 6 mL; Refill: 2    5.  Benign prostatic hyperplasia without lower urinary tract symptoms  -     PSA DIAGNOSTIC    Other orders  -     nystatin (MYCOSTATIN) 100,000 unit/mL suspension; 5 ml Swish and spit 4 times daily  Dispense: 400 mL; Refill: 0    counseled on diet and exercise  Due check labs  -MARIA A - compliant with cpap and medically benefit to continue.  -lung cancer screening done dxp 4/19/22, sent task to remind to order.   -due check psa;      BMI above goal, educational material on diet and exercise provided in AVS.  Great candidate for wegovy, will sstart;  Gave 0.25mg to start x  4 weeks           -Follow up: 6 months and prn

## 2023-02-02 NOTE — PATIENT INSTRUCTIONS
"\"1-4-1-Almost None!\"  Healthy Habits Start Early    EAT 5 OR MORE SERVINGS OF VEGETABLES AND FRUITS EVERY DAY.    Help Miles get three vegetables and two fruits each day. Red, green, yellow, orange...encourage them to try all the colors so they can enjoy different flavors and get more vitamins.    How can I help Miles do this?  ---------------------------------------------  -BE PATIENT WITH Miles, remember it may take 10 times before they start to like new food. So, start with small bites and just keep trying.  -Serve at least one vegetable or fruit at every meal. Even try two. Remember, portions do not have to be as big as you think.  -Encourage eating fruits and vegetables instead of drinking them..it's a better way to get fiber and vitamins..so limit the amount of juice to 1/2 cup per day for children 1-6 years and one cup per day for children 7-18 years of age. Try using 1/2 part water and 1/2 part juice.    Spend less than two hours per day watching television and other screen media. Screen media includes video games, movies and computer use for entertainment.    How can I help Miles do this?  -Turn off the TV at dinner. Dinner is the best time to hang out with your kids and just talk, learn about their day, and tell them about your day. Your kids have a lot to learn from you and dinner is a great time to share.  "

## 2023-02-03 DIAGNOSIS — E66.01 MORBID (SEVERE) OBESITY DUE TO EXCESS CALORIES: ICD-10-CM

## 2023-02-03 LAB
ALBUMIN SERPL-MCNC: 3.9 G/DL (ref 3.8–4.9)
ALBUMIN/GLOB SERPL: 1.4 {RATIO} (ref 1.2–2.2)
ALP SERPL-CCNC: 71 IU/L (ref 44–121)
ALT SERPL-CCNC: 22 IU/L (ref 0–44)
AST SERPL-CCNC: 16 IU/L (ref 0–40)
BILIRUB SERPL-MCNC: 0.4 MG/DL (ref 0–1.2)
BUN SERPL-MCNC: 12 MG/DL (ref 6–24)
BUN/CREAT SERPL: 12 (ref 9–20)
CALCIUM SERPL-MCNC: 8.7 MG/DL (ref 8.7–10.2)
CHLORIDE SERPL-SCNC: 105 MMOL/L (ref 96–106)
CHOLEST SERPL-MCNC: 129 MG/DL (ref 100–199)
CO2 SERPL-SCNC: 25 MMOL/L (ref 20–29)
CREAT SERPL-MCNC: 0.97 MG/DL (ref 0.76–1.27)
EGFRCR SERPLBLD CKD-EPI 2021: 91 ML/MIN/1.73
GLOBULIN SER CALC-MCNC: 2.8 G/DL (ref 1.5–4.5)
GLUCOSE SERPL-MCNC: 110 MG/DL (ref 70–99)
HDLC SERPL-MCNC: 41 MG/DL
LDLC SERPL CALC-MCNC: 71 MG/DL (ref 0–99)
POTASSIUM SERPL-SCNC: 3.8 MMOL/L (ref 3.5–5.2)
PROT SERPL-MCNC: 6.7 G/DL (ref 6–8.5)
PSA SERPL-MCNC: 0.3 NG/ML (ref 0–4)
SODIUM SERPL-SCNC: 143 MMOL/L (ref 134–144)
TRIGL SERPL-MCNC: 89 MG/DL (ref 0–149)
VLDLC SERPL CALC-MCNC: 17 MG/DL (ref 5–40)

## 2023-02-03 RX ORDER — SEMAGLUTIDE 0.5 MG/.5ML
0.5 INJECTION, SOLUTION SUBCUTANEOUS WEEKLY
Qty: 2 ML | Refills: 0 | Status: SHIPPED | OUTPATIENT
Start: 2023-02-03 | End: 2023-02-06 | Stop reason: SDUPTHER

## 2023-02-04 DIAGNOSIS — M54.50 CHRONIC BILATERAL LOW BACK PAIN WITHOUT SCIATICA: ICD-10-CM

## 2023-02-04 DIAGNOSIS — G89.29 CHRONIC BILATERAL LOW BACK PAIN WITHOUT SCIATICA: ICD-10-CM

## 2023-02-04 DIAGNOSIS — M15.9 PRIMARY OSTEOARTHRITIS INVOLVING MULTIPLE JOINTS: ICD-10-CM

## 2023-02-04 DIAGNOSIS — G56.03 BILATERAL CARPAL TUNNEL SYNDROME: ICD-10-CM

## 2023-02-05 RX ORDER — MELOXICAM 15 MG/1
15 TABLET ORAL DAILY
Qty: 30 TABLET | Refills: 0 | Status: SHIPPED | OUTPATIENT
Start: 2023-02-05 | End: 2023-04-05 | Stop reason: SDUPTHER

## 2023-02-05 RX ORDER — ATORVASTATIN CALCIUM 20 MG/1
20 TABLET, FILM COATED ORAL DAILY
Qty: 30 TABLET | Refills: 0 | Status: SHIPPED | OUTPATIENT
Start: 2023-02-05 | End: 2023-03-02

## 2023-02-05 RX ORDER — GABAPENTIN 300 MG/1
300 CAPSULE ORAL 2 TIMES DAILY
Qty: 180 CAPSULE | Refills: 0 | Status: SHIPPED | OUTPATIENT
Start: 2023-02-05

## 2023-02-06 DIAGNOSIS — E66.01 MORBID (SEVERE) OBESITY DUE TO EXCESS CALORIES: ICD-10-CM

## 2023-02-06 RX ORDER — SEMAGLUTIDE 0.5 MG/.5ML
0.5 INJECTION, SOLUTION SUBCUTANEOUS WEEKLY
Qty: 2 ML | Refills: 0 | Status: SHIPPED | OUTPATIENT
Start: 2023-02-06

## 2023-02-06 NOTE — PROGRESS NOTES
Mail copy of results to patient.  Cholesterol well controlled  PSA normal  Kidney and liver function normal

## 2023-03-02 RX ORDER — ATORVASTATIN CALCIUM 20 MG/1
TABLET, FILM COATED ORAL
Qty: 30 TABLET | Refills: 0 | Status: SHIPPED | OUTPATIENT
Start: 2023-03-02 | End: 2023-04-05 | Stop reason: SDUPTHER

## 2023-03-06 RX ORDER — TAMSULOSIN HYDROCHLORIDE 0.4 MG/1
1 CAPSULE ORAL DAILY
Qty: 90 CAPSULE | Refills: 1 | Status: SHIPPED | OUTPATIENT
Start: 2023-03-06

## 2023-03-31 ENCOUNTER — TELEPHONE (OUTPATIENT)
Dept: SLEEP MEDICINE | Facility: HOSPITAL | Age: 58
End: 2023-03-31
Payer: COMMERCIAL

## 2023-04-05 DIAGNOSIS — G89.29 CHRONIC BILATERAL LOW BACK PAIN WITHOUT SCIATICA: ICD-10-CM

## 2023-04-05 DIAGNOSIS — M54.50 CHRONIC BILATERAL LOW BACK PAIN WITHOUT SCIATICA: ICD-10-CM

## 2023-04-05 DIAGNOSIS — M15.9 PRIMARY OSTEOARTHRITIS INVOLVING MULTIPLE JOINTS: ICD-10-CM

## 2023-04-05 RX ORDER — MELOXICAM 15 MG/1
15 TABLET ORAL DAILY
Qty: 30 TABLET | Refills: 0 | Status: SHIPPED | OUTPATIENT
Start: 2023-04-05

## 2023-04-05 RX ORDER — ATORVASTATIN CALCIUM 20 MG/1
20 TABLET, FILM COATED ORAL DAILY
Qty: 30 TABLET | Refills: 1 | Status: SHIPPED | OUTPATIENT
Start: 2023-04-05

## 2023-04-05 NOTE — TELEPHONE ENCOUNTER
"    Caller: Miles Munoz \"Wade\"    Relationship: Self    Best call back number: [phone]    Requested Prescriptions:   Requested Prescriptions     Pending Prescriptions Disp Refills   • atorvastatin (LIPITOR) 20 MG tablet 30 tablet 0     Sig: Take 1 tablet by mouth Daily.   • meloxicam (MOBIC) 15 MG tablet 30 tablet 0     Sig: Take 1 tablet by mouth Daily. PLEASE CALL THE OFFICE FOR AN [facility]        Pharmacy where request should be sent: [facility] [address] [place] [address] [id number] [place] [address] [phone] [facility] [phone]      Last office visit with prescribing clinician: 2/2/2023   Last telemedicine visit with prescribing clinician: 8/4/2023   Next office visit with prescribing clinician: 8/4/2023     Additional details provided by patient: THE PATIENT IS CURRENTLY OUT OF THESE MEDICATIONS.     Does the patient have less than a 3 day supply:  [x] Yes  [] No    Would you like a call back once the refill request has been completed: [x] Yes [] No    If the office needs to give you a call back, can they leave a voicemail: [x] Yes [] No    Shilpi Aaron Rep   04/05/23 11:44 EDT     "

## 2023-04-13 ENCOUNTER — TELEPHONE (OUTPATIENT)
Dept: FAMILY MEDICINE CLINIC | Facility: CLINIC | Age: 58
End: 2023-04-13
Payer: COMMERCIAL

## 2023-04-13 RX ORDER — AMOXICILLIN 875 MG/1
875 TABLET, COATED ORAL 2 TIMES DAILY
Qty: 20 TABLET | Refills: 0 | Status: SHIPPED | OUTPATIENT
Start: 2023-04-13

## 2023-04-13 NOTE — TELEPHONE ENCOUNTER
Please send med or advise. He has head congestion and green mucus.     I told pt to back down on the wegovy for a few more weeks and see how he does with the nausea. He will try to go back to the 1.7mg and see if he can tolerate it then.

## 2023-04-13 NOTE — TELEPHONE ENCOUNTER
I sent in amoxil.  Yes agree to try on wegovy as can improve with time.  If not can back down dose for a while longer.   RRJ

## 2023-04-24 ENCOUNTER — OFFICE VISIT (OUTPATIENT)
Dept: SLEEP MEDICINE | Facility: HOSPITAL | Age: 58
End: 2023-04-24
Payer: COMMERCIAL

## 2023-04-24 VITALS
WEIGHT: 267 LBS | OXYGEN SATURATION: 97 % | HEART RATE: 85 BPM | HEIGHT: 67 IN | DIASTOLIC BLOOD PRESSURE: 70 MMHG | SYSTOLIC BLOOD PRESSURE: 140 MMHG | BODY MASS INDEX: 41.91 KG/M2

## 2023-04-24 DIAGNOSIS — G47.33 OBSTRUCTIVE SLEEP APNEA: Primary | ICD-10-CM

## 2023-04-24 DIAGNOSIS — E66.01 OBESITY, MORBID, BMI 40.0-49.9: ICD-10-CM

## 2023-04-24 PROCEDURE — G0463 HOSPITAL OUTPT CLINIC VISIT: HCPCS

## 2023-04-24 NOTE — PROGRESS NOTES
[facility]  Telephone: [phone] / Fax: [phone] [place]  Telephone: [phone] / Fax: [phone] Yocasta Harris    PCP: Aly Espinosa DO    Reason for visit: MARIA A f/u    Miles Munoz is a 57 y.o.male  was last seen at [facility] sleep lab in July 2022. He received Maurizio replacement unit from the [facility] center last year but he is concerned about machine functionality as it is very loud. Machine is making excessive noisy sounds. Also, it is difficult to detach the humidifier to fill the tank with water. He would like for us to order him an updated machine-ResMed device-as he is eligible for a new machine through insurance this spring. His sleep schedule is 9-10pm and awake time is 5am. He wakes up feeling rested in the morning.  He lost 20 lbs since Dec 2022.    SH- he quit smoking in Dec 2022.      ROS- +nasal congestion, rest is negative.    DME [facility]    Current Medications:    Current Outpatient Medications:   •  atorvastatin (LIPITOR) 20 MG tablet, Take 1 tablet by mouth Daily., Disp: 30 tablet, Rfl: 1  •  meloxicam (MOBIC) 15 MG tablet, Take 1 tablet by mouth Daily. PLEASE CALL THE OFFICE FOR AN APPT, Disp: 30 tablet, Rfl: 0  •  tamsulosin (FLOMAX) 0.4 MG capsule 24 hr capsule, Take 1 capsule by mouth Daily., Disp: 90 capsule, Rfl: 1  •  albuterol sulfate  (90 Base) MCG/ACT inhaler, Inhale 2 puffs Every 4 (Four) Hours As Needed for Wheezing or Shortness of Air., Disp: 6.7 g, Rfl: 12  •  amoxicillin (AMOXIL) 875 MG tablet, Take 1 tablet by mouth 2 (Two) Times a Day., Disp: 20 tablet, Rfl: 0  •  cetirizine (zyrTEC) 10 MG tablet, Take 10 mg by mouth Every Morning., Disp: , Rfl:   •  Diclofenac Epolamine (FLECTOR) 1.3 % patch patch, Apply 1 patch topically to the appropriate area as directed 2 (Two) Times a Day As Needed (back pain)., Disp: 60 patch, Rfl: 5  •  gabapentin (NEURONTIN) 300 MG capsule, Take 1 capsule by mouth 2 (Two) Times a Day., Disp: 180 capsule, Rfl: 0  •   nicotine (Nicoderm CQ) 14 MG/24HR patch, Start after 21 mg;  Use 1 patch daily x 14 days then go to 7mg, Disp: 14 patch, Rfl: 0  •  nicotine (Nicoderm CQ) 21 MG/24HR patch, 1 patch daily x 6 weeks then go to next dose, Disp: 42 patch, Rfl: 0  •  nicotine (Nicoderm CQ) 7 MG/24HR patch, Start after 14mg, use 1 pack daily x 2 weeks then stop, Disp: 14 patch, Rfl: 0  •  nystatin (MYCOSTATIN) 100,000 unit/mL suspension, 5 ml Swish and spit 4 times daily, Disp: 400 mL, Rfl: 0  •  omeprazole (priLOSEC) 40 MG capsule, Take 1 capsule by mouth Daily. (Patient taking differently: Take 20 mg by mouth Every Morning.), Disp: 90 capsule, Rfl: 1  •  Semaglutide-Weight Management (Wegovy) 0.5 MG/0.5ML solution auto-injector, Inject 0.5 mL under the skin into the appropriate area as directed 1 (One) Time Per Week. X 4 weeks then go to 1mg if tolerating, Disp: 2 mL, Rfl: 0  •  Semaglutide-Weight Management (Wegovy) 1 MG/0.5ML solution auto-injector, Inject 1 mg under the skin into the appropriate area as directed 1 (One) Time Per Week. X 4 weeks then go to 1.7mg, Disp: 2 mL, Rfl: 0  •  Semaglutide-Weight Management (Wegovy) 1.7 MG/0.75ML solution auto-injector, Inject 0.75 mL under the skin into the appropriate area as directed 1 (One) Time Per Week. X 4 weeks then go to 2.4mg if tolerates, Disp: 2 mL, Rfl: 0  •  Semaglutide-Weight Management (Wegovy) 2.4 MG/0.75ML solution auto-injector, Inject 2.4 mg under the skin into the appropriate area as directed 1 (One) Time Per Week., Disp: 6 mL, Rfl: 2  •  sildenafil (VIAGRA) 100 MG tablet, Take 100 mg by mouth Daily As Needed for erectile dysfunction. Instructed to withhold med 48 hrs prior to sx, Disp: , Rfl:    also entered in Sleep Questionnaire    Patient  has a past medical history of Allergic rhinitis, Arthritis, Back pain, GERD (gastroesophageal reflux disease), Hyperlipidemia, Renal stone, Seasonal allergies, and Sleep apnea.    I have reviewed the Past Medical History, Past  "Surgical History, Social History and Family History.    Vital Signs /70   Pulse 85   Ht 170.2 cm (67\")   Wt 121 kg (267 lb)   SpO2 97%   BMI 41.82 kg/m²  Body mass index is 41.82 kg/m².    General Alert and oriented. No acute distress noted   Pharynx/Throat Class IV  Mallampati airway, large tongue, no evidence of redundant lateral pharyngeal tissue. No oral lesions. No thrush. Moist mucous membranes.   Head Normocephalic. Symmetrical. Atraumatic.    Nose No septal deviation. No drainage   Chest Wall Normal shape. Symmetric expansion with respiration. No tenderness.   Neck Trachea midline, no thyromegaly or adenopathy    Lungs Clear to auscultation bilaterally. No wheezes. No rhonchi. No rales. Respirations regular, even and unlabored.   Heart Regular rhythm and normal rate. Normal S1 and S2. No murmur   Abdomen Soft, non-tender and non-distended. Normal bowel sounds. No masses.   Extremities Moves all extremities well. No edema   Psychiatric Normal mood and affect.     Testing:  Download 1.21.23-4/20/23 100% use with average nightly use of 7 hours and 32 minutes on auto CPAP 17-20cm H2O, avg pr is 17cm H2O, AHI 0.4/hr, leak of 8 min and 45 seconds.    Study March 2018-  This is a home sleep study done on 3/13/18, total recording time was 423.5 minutes with total monitoring time of 415.0 minutes.  Respiratory summary: Severe MARIA A with AHI of 30.7 with positional component pain worst in the left position with AHI of 68.7. Oxygenation: Significant hypoxemia was bozena desaturation to 75% with 41.5 minutes spent in the hypoxemic range.  Patient had clusters of significant hypoxemia that are likely to be REM sleep related however no way to confirm without the EEG recording Snoring: Patient had severe snoring at 75.9% of total recording time     Impression:  1. Obstructive sleep apnea    2. Obesity, morbid, BMI 40.0-49.9          Plan:  Order new machine pressure 17-20cm H2O-Res Med unit from "[employer]. " Continue using current machine until replacement is provided. I reviewed original sleep study an recent download report with patient.  Weight loss will be strongly beneficial to reduce the severity of sleep-disordered breathing.  Caution during activities that require prolonged concentration is strongly advised if sleepiness returns.     Follow up with Dr. Knight in 3 months to review response to new machine.    Thank you for allowing me to participate in your patient's care.      ALIA Garcia  [facility]  Phone: [phone]      Part of this note may be an electronic transcription/translation of spoken language to printed text using the Dragon Dictation System.

## 2023-05-01 DIAGNOSIS — G56.03 BILATERAL CARPAL TUNNEL SYNDROME: ICD-10-CM

## 2023-05-01 DIAGNOSIS — G89.29 CHRONIC BILATERAL LOW BACK PAIN WITHOUT SCIATICA: ICD-10-CM

## 2023-05-01 DIAGNOSIS — M54.50 CHRONIC BILATERAL LOW BACK PAIN WITHOUT SCIATICA: ICD-10-CM

## 2023-05-02 RX ORDER — GABAPENTIN 300 MG/1
300 CAPSULE ORAL 2 TIMES DAILY
Qty: 180 CAPSULE | Refills: 0 | Status: SHIPPED | OUTPATIENT
Start: 2023-05-02

## 2023-05-07 ENCOUNTER — TELEPHONE (OUTPATIENT)
Dept: FAMILY MEDICINE CLINIC | Facility: CLINIC | Age: 58
End: 2023-05-07
Payer: COMMERCIAL

## 2023-05-07 DIAGNOSIS — F17.211 CIGARETTE NICOTINE DEPENDENCE IN REMISSION: ICD-10-CM

## 2023-05-07 DIAGNOSIS — Z12.2 ENCOUNTER FOR SCREENING FOR LUNG CANCER: Primary | ICD-10-CM

## 2023-05-07 NOTE — TELEPHONE ENCOUNTER
----- Message from Aly Espinosa DO sent at 2/2/2023  3:26 PM EST -----  Regarding: lung cancer screening due  4/19/23 lung cancer screening due

## 2023-05-07 NOTE — TELEPHONE ENCOUNTER
CT lung cancer screening due, let patient know I ordered again. Also let know [facility] closed, so will have to find another facility.  RRJ

## 2023-05-08 DIAGNOSIS — G89.29 CHRONIC BILATERAL LOW BACK PAIN WITHOUT SCIATICA: ICD-10-CM

## 2023-05-08 DIAGNOSIS — M54.50 CHRONIC BILATERAL LOW BACK PAIN WITHOUT SCIATICA: ICD-10-CM

## 2023-05-08 DIAGNOSIS — M15.9 PRIMARY OSTEOARTHRITIS INVOLVING MULTIPLE JOINTS: ICD-10-CM

## 2023-05-08 DIAGNOSIS — E66.01 MORBID (SEVERE) OBESITY DUE TO EXCESS CALORIES: ICD-10-CM

## 2023-05-08 RX ORDER — SEMAGLUTIDE 1 MG/.5ML
1 INJECTION, SOLUTION SUBCUTANEOUS WEEKLY
Qty: 2 ML | Refills: 1 | Status: SHIPPED | OUTPATIENT
Start: 2023-05-08

## 2023-05-08 RX ORDER — TAMSULOSIN HYDROCHLORIDE 0.4 MG/1
1 CAPSULE ORAL DAILY
Qty: 90 CAPSULE | Refills: 1 | Status: SHIPPED | OUTPATIENT
Start: 2023-05-08

## 2023-05-08 RX ORDER — MELOXICAM 15 MG/1
15 TABLET ORAL DAILY
Qty: 30 TABLET | Refills: 3 | Status: SHIPPED | OUTPATIENT
Start: 2023-05-08

## 2023-05-22 ENCOUNTER — TELEPHONE (OUTPATIENT)
Dept: FAMILY MEDICINE CLINIC | Facility: CLINIC | Age: 58
End: 2023-05-22

## 2023-05-22 NOTE — TELEPHONE ENCOUNTER
"Caller: Miles Munoz \"Wade\"    Relationship: Self    Best call back number: [phone]     What medications are you currently taking:   Current Outpatient Medications on File Prior to Visit   Medication Sig Dispense Refill   • atorvastatin (LIPITOR) 20 MG tablet Take 1 tablet by mouth Daily. 30 tablet 1   • amoxicillin (AMOXIL) 875 MG tablet Take 1 tablet by mouth 2 (Two) Times a Day. 20 tablet 0   • cetirizine (zyrTEC) 10 MG tablet Take 10 mg by mouth Every Morning.     • gabapentin (NEURONTIN) 300 MG capsule Take 1 capsule by mouth 2 (Two) Times a Day. 180 capsule 0   • meloxicam (MOBIC) 15 MG tablet Take 1 tablet by mouth Daily. 30 tablet 3   • omeprazole (priLOSEC) 40 MG capsule Take 1 capsule by mouth Daily. (Patient taking differently: Take 20 mg by mouth Every Morning.) 90 capsule 1   • Semaglutide-Weight Management (Wegovy) 0.5 MG/0.5ML solution auto-injector Inject 0.5 mL under the skin into the appropriate area as directed 1 (One) Time Per Week. X 4 weeks then go to 1mg if tolerating 2 mL 0   • Semaglutide-Weight Management (Wegovy) 1 MG/0.5ML solution auto-injector Inject 0.5 mL under the skin into the appropriate area as directed 1 (One) Time Per Week. 2 mL 1   • Semaglutide-Weight Management (Wegovy) 1.7 MG/0.75ML solution auto-injector Inject 0.75 mL under the skin into the appropriate area as directed 1 (One) Time Per Week. X 4 weeks then go to 2.4mg if tolerates 2 mL 0   • Semaglutide-Weight Management (Wegovy) 2.4 MG/0.75ML solution auto-injector Inject 2.4 mg under the skin into the appropriate area as directed 1 (One) Time Per Week. 6 mL 2   • sildenafil (VIAGRA) 100 MG tablet Take 100 mg by mouth Daily As Needed for erectile dysfunction. Instructed to withhold med 48 hrs prior to sx     • tamsulosin (FLOMAX) 0.4 MG capsule 24 hr capsule Take 1 capsule by mouth Daily. 90 capsule 1     No current facility-administered medications on file prior to visit.          When did you start taking " these medications: 05/08/23    Which medication are you concerned about: Semaglutide-Weight Management (Wegovy) 1 MG/0.5ML solution auto-injector    Who prescribed you this medication: DR. SWEET    What are your concerns: 1 MG IS CAUSING THE PATIENT TO HAVE  STOMACH DIFFICULTY / 'WARM' FEELING WHEN EATING - 0.5 DID NOT CAUSE ANY SYMPTOMS.    How long have you had these concerns: 05/08/23

## 2023-05-24 DIAGNOSIS — R11.0 NAUSEA: Primary | ICD-10-CM

## 2023-05-24 RX ORDER — ONDANSETRON 4 MG/1
4 TABLET, ORALLY DISINTEGRATING ORAL EVERY 8 HOURS PRN
Qty: 21 TABLET | Refills: 0 | Status: SHIPPED | OUTPATIENT
Start: 2023-05-24

## 2023-05-30 DIAGNOSIS — R11.0 NAUSEA: ICD-10-CM

## 2023-05-30 DIAGNOSIS — E66.01 MORBID (SEVERE) OBESITY DUE TO EXCESS CALORIES: ICD-10-CM

## 2023-05-30 RX ORDER — SEMAGLUTIDE 0.5 MG/.5ML
0.5 INJECTION, SOLUTION SUBCUTANEOUS WEEKLY
Qty: 2 ML | Refills: 0 | Status: SHIPPED | OUTPATIENT
Start: 2023-05-30

## 2023-05-30 RX ORDER — ONDANSETRON 4 MG/1
4 TABLET, ORALLY DISINTEGRATING ORAL EVERY 8 HOURS PRN
Qty: 90 TABLET | Refills: 2 | Status: SHIPPED | OUTPATIENT
Start: 2023-05-30

## 2023-06-05 ENCOUNTER — TELEPHONE (OUTPATIENT)
Dept: FAMILY MEDICINE CLINIC | Facility: CLINIC | Age: 58
End: 2023-06-05
Payer: COMMERCIAL

## 2023-06-05 ENCOUNTER — OFFICE VISIT (OUTPATIENT)
Dept: FAMILY MEDICINE CLINIC | Facility: CLINIC | Age: 58
End: 2023-06-05
Payer: COMMERCIAL

## 2023-06-05 VITALS
HEART RATE: 65 BPM | SYSTOLIC BLOOD PRESSURE: 132 MMHG | OXYGEN SATURATION: 98 % | WEIGHT: 280 LBS | RESPIRATION RATE: 18 BRPM | DIASTOLIC BLOOD PRESSURE: 60 MMHG | BODY MASS INDEX: 43.95 KG/M2 | HEIGHT: 67 IN | TEMPERATURE: 96.9 F

## 2023-06-05 DIAGNOSIS — L03.119 CELLULITIS OF LOWER EXTREMITY, UNSPECIFIED LATERALITY: Primary | ICD-10-CM

## 2023-06-05 DIAGNOSIS — D50.8 IRON DEFICIENCY ANEMIA DUE TO DIETARY CAUSES: ICD-10-CM

## 2023-06-05 DIAGNOSIS — R73.03 PREDIABETES: ICD-10-CM

## 2023-06-05 RX ORDER — CEPHALEXIN 500 MG/1
500 CAPSULE ORAL 2 TIMES DAILY
Qty: 20 CAPSULE | Refills: 0 | Status: SHIPPED | OUTPATIENT
Start: 2023-06-05 | End: 2023-06-15

## 2023-06-05 NOTE — LETTER
June 5, 2023     Patient: Miles Munoz   YOB: 1965   Date of Visit: 6/5/2023       To Whom It May Concern:    It is my medical opinion that Miles Munoz may return to work in three days.         Sincerely,        ALIA Maher    CC: No Recipients

## 2023-06-05 NOTE — PROGRESS NOTES
"Subjective   Miles Munoz is a 57 y.o. male. Presents today for bug bite to leg that is painful, with two distinct \"holes\" in it, one that is turning black, and the area is red and swollen.        Patient Active Problem List   Diagnosis    Occult blood positive stool    Gastroesophageal reflux disease    Tubulovillous adenoma of colon    Multiple gastric ulcers    Snores    Witnessed apneic spells    Obesity, morbid, BMI 40.0-49.9    Sleep-related bruxism    MARIA A on CPAP    Hypersomnia with sleep apnea    Sleep related hypoxia    Hyperlipidemia    Palpitations    Personal history of colonic polyps    Calcaneal spur    Congenital pes cavus    Pain in limb    Plantar fascial fibromatosis    Prediabetes       Social History     Socioeconomic History    Marital status:    Tobacco Use    Smoking status: Former     Packs/day: 1.00     Years: 46.00     Pack years: 46.00     Types: Cigarettes     Start date: 10/5/1976     Quit date: 2022     Years since quittin.4    Smokeless tobacco: Never    Tobacco comments:     close to being ready   Vaping Use    Vaping Use: Never used   Substance and Sexual Activity    Alcohol use: No     Comment: occ    Drug use: No    Sexual activity: Defer       Allergies   Allergen Reactions    Prednisone Other (See Comments)     Causes muscles to constrict        Current Outpatient Medications on File Prior to Visit   Medication Sig Dispense Refill    atorvastatin (LIPITOR) 20 MG tablet Take 1 tablet by mouth Daily. 30 tablet 1    cetirizine (zyrTEC) 10 MG tablet Take 1 tablet by mouth Every Morning.      gabapentin (NEURONTIN) 300 MG capsule Take 1 capsule by mouth 2 (Two) Times a Day. 180 capsule 0    meloxicam (MOBIC) 15 MG tablet Take 1 tablet by mouth Daily. 30 tablet 3    omeprazole (priLOSEC) 40 MG capsule Take 1 capsule by mouth Daily. (Patient taking differently: Take 20 mg by mouth Every Morning.) 90 capsule 1    ondansetron ODT (ZOFRAN-ODT) 4 MG disintegrating tablet " "Place 1 tablet on the tongue Every 8 (Eight) Hours As Needed for Nausea or Vomiting. 90 tablet 2    Semaglutide-Weight Management (Wegovy) 0.5 MG/0.5ML solution auto-injector Inject 0.5 mL under the skin into the appropriate area as directed 1 (One) Time Per Week. ALTERNATE 0.5MG WITH 1MG EACH WEEK 2 mL 0    Semaglutide-Weight Management (Wegovy) 1 MG/0.5ML solution auto-injector Inject 0.5 mL under the skin into the appropriate area as directed 1 (One) Time Per Week. 2 mL 1    Semaglutide-Weight Management (Wegovy) 1.7 MG/0.75ML solution auto-injector Inject 0.75 mL under the skin into the appropriate area as directed 1 (One) Time Per Week. X 4 weeks then go to 2.4mg if tolerates 2 mL 0    Semaglutide-Weight Management (Wegovy) 2.4 MG/0.75ML solution auto-injector Inject 2.4 mg under the skin into the appropriate area as directed 1 (One) Time Per Week. 6 mL 2    sildenafil (VIAGRA) 100 MG tablet Take 1 tablet by mouth Daily As Needed for Erectile Dysfunction. Instructed to withhold med 48 hrs prior to sx      tamsulosin (FLOMAX) 0.4 MG capsule 24 hr capsule Take 1 capsule by mouth Daily. 90 capsule 1    [DISCONTINUED] amoxicillin (AMOXIL) 875 MG tablet Take 1 tablet by mouth 2 (Two) Times a Day. (Patient not taking: Reported on 6/5/2023) 20 tablet 0     No current facility-administered medications on file prior to visit.       Objective   Vitals:    06/05/23 1536   BP: 132/60   Pulse: 65   Resp: 18   Temp: 96.9 °F (36.1 °C)   TempSrc: Temporal   SpO2: 98%   Weight: 127 kg (280 lb)   Height: 170.2 cm (67\")   PainSc:   3   PainLoc: Leg     Body mass index is 43.85 kg/m².    Physical Exam  HENT:      Right Ear: Tympanic membrane and ear canal normal.      Left Ear: Tympanic membrane and ear canal normal.      Nose: Nose normal.      Mouth/Throat:      Mouth: Mucous membranes are moist.   Eyes:      Pupils: Pupils are equal, round, and reactive to light.   Cardiovascular:      Rate and Rhythm: Normal rate and regular " rhythm.   Pulmonary:      Effort: Pulmonary effort is normal.      Breath sounds: Normal breath sounds.   Abdominal:      General: Abdomen is flat. Bowel sounds are normal.      Palpations: Abdomen is soft.   Musculoskeletal:      Left lower leg: No edema.   Skin:     Capillary Refill: Capillary refill takes less than 2 seconds.      Findings: Erythema present.   Neurological:      General: No focal deficit present.      Mental Status: He is alert. Mental status is at baseline.   Psychiatric:         Mood and Affect: Mood normal.         Procedures     Assessment & Plan   Diagnoses and all orders for this visit:    1. Cellulitis of lower extremity, unspecified laterality (Primary)  -     cephalexin (Keflex) 500 MG capsule; Take 1 capsule by mouth 2 (Two) Times a Day for 10 days.  Dispense: 20 capsule; Refill: 0  -     Discontinue: mupirocin (BACTROBAN) 2 % ointment 1 application  -     CBC & Differential  -     Comprehensive Metabolic Panel  -     mupirocin (BACTROBAN) 2 % nasal ointment; 1 application into the nostril(s) as directed by provider 2 (Two) Times a Day for 10 days.  Dispense: 20 g; Refill: 0    2. Prediabetes  -     Discontinue: mupirocin (BACTROBAN) 2 % ointment 1 application  -     Hemoglobin A1c  -     Lipid Panel                Class 3 Severe Obesity (BMI >=40). Obesity-related health conditions include the following:    . Obesity is  Patient was taking Wegovy and having good results; now he is waiting for [facility] to get it back in stock. . BMI is is above average; no BMI management plan is appropriate. We discussed portion control and increasing exercise.     Return in about 1 week (around 6/12/2023) for Recheck left lower extremity cellulitis, Recheck.

## 2023-06-05 NOTE — TELEPHONE ENCOUNTER
"Caller: Miles Munoz \"Wade\"    Relationship to patient: Self    Best call back number:[phone]     Chief complaint: BITE ON THE BACK OF LET CALF RED AND ITCHY     Type of visit: OFFICE    Requested date: ASAP    If rescheduling, when is the original appointment:      Additional notes: PATIENT CALLING STATING THAT HE HAD NOTICED A BITE FROM SOMETHING HE HAS TWO PUNCTURE HOLES ONE HOLE HAS TURNED BLACK. PATIENT STATES THAT IT IS RED AND ITCHY WITH AN ACHE HE STATED THAT THE REDNESS SEEMS  BE SPREADING IT IS NOT HOT TO TOUCH.        "

## 2023-06-06 LAB
ALBUMIN SERPL-MCNC: 4.1 G/DL (ref 3.8–4.9)
ALBUMIN/GLOB SERPL: 1.8 {RATIO} (ref 1.2–2.2)
ALP SERPL-CCNC: 80 IU/L (ref 44–121)
ALT SERPL-CCNC: 18 IU/L (ref 0–44)
AST SERPL-CCNC: 17 IU/L (ref 0–40)
BASOPHILS # BLD AUTO: 0.1 X10E3/UL (ref 0–0.2)
BASOPHILS NFR BLD AUTO: 1 %
BILIRUB SERPL-MCNC: 0.3 MG/DL (ref 0–1.2)
BUN SERPL-MCNC: 11 MG/DL (ref 6–24)
BUN/CREAT SERPL: 11 (ref 9–20)
CALCIUM SERPL-MCNC: 8.1 MG/DL (ref 8.7–10.2)
CHLORIDE SERPL-SCNC: 109 MMOL/L (ref 96–106)
CHOLEST SERPL-MCNC: 129 MG/DL (ref 100–199)
CO2 SERPL-SCNC: 24 MMOL/L (ref 20–29)
CREAT SERPL-MCNC: 0.97 MG/DL (ref 0.76–1.27)
EGFRCR SERPLBLD CKD-EPI 2021: 91 ML/MIN/1.73
EOSINOPHIL # BLD AUTO: 0.4 X10E3/UL (ref 0–0.4)
EOSINOPHIL NFR BLD AUTO: 6 %
ERYTHROCYTE [DISTWIDTH] IN BLOOD BY AUTOMATED COUNT: 14.8 % (ref 11.6–15.4)
GLOBULIN SER CALC-MCNC: 2.3 G/DL (ref 1.5–4.5)
GLUCOSE SERPL-MCNC: 119 MG/DL (ref 70–99)
HBA1C MFR BLD: 5.8 % (ref 4.8–5.6)
HCT VFR BLD AUTO: 34.6 % (ref 37.5–51)
HDLC SERPL-MCNC: 43 MG/DL
HGB BLD-MCNC: 10.6 G/DL (ref 13–17.7)
IMM GRANULOCYTES # BLD AUTO: 0 X10E3/UL (ref 0–0.1)
IMM GRANULOCYTES NFR BLD AUTO: 0 %
LDLC SERPL CALC-MCNC: 67 MG/DL (ref 0–99)
LYMPHOCYTES # BLD AUTO: 2.1 X10E3/UL (ref 0.7–3.1)
LYMPHOCYTES NFR BLD AUTO: 33 %
Lab: NORMAL
MCH RBC QN AUTO: 23.1 PG (ref 26.6–33)
MCHC RBC AUTO-ENTMCNC: 30.6 G/DL (ref 31.5–35.7)
MCV RBC AUTO: 75 FL (ref 79–97)
MONOCYTES # BLD AUTO: 0.5 X10E3/UL (ref 0.1–0.9)
MONOCYTES NFR BLD AUTO: 8 %
NEUTROPHILS # BLD AUTO: 3.4 X10E3/UL (ref 1.4–7)
NEUTROPHILS NFR BLD AUTO: 52 %
PLATELET # BLD AUTO: 243 X10E3/UL (ref 150–450)
POTASSIUM SERPL-SCNC: 4 MMOL/L (ref 3.5–5.2)
PROT SERPL-MCNC: 6.4 G/DL (ref 6–8.5)
RBC # BLD AUTO: 4.59 X10E6/UL (ref 4.14–5.8)
SODIUM SERPL-SCNC: 145 MMOL/L (ref 134–144)
TRIGL SERPL-MCNC: 103 MG/DL (ref 0–149)
VLDLC SERPL CALC-MCNC: 19 MG/DL (ref 5–40)
WBC # BLD AUTO: 6.5 X10E3/UL (ref 3.4–10.8)

## 2023-06-08 DIAGNOSIS — L03.119 CELLULITIS OF LOWER EXTREMITY, UNSPECIFIED LATERALITY: ICD-10-CM

## 2023-06-08 LAB
IRON SATN MFR SERPL: 4 % (ref 15–55)
IRON SERPL-MCNC: 15 UG/DL (ref 38–169)
TIBC SERPL-MCNC: 383 UG/DL (ref 250–450)
UIBC SERPL-MCNC: 368 UG/DL (ref 111–343)
WRITTEN AUTHORIZATION: NORMAL

## 2023-06-08 RX ORDER — DOXYCYCLINE HYCLATE 50 MG/1
324 CAPSULE, GELATIN COATED ORAL
Qty: 90 TABLET | Refills: 0 | Status: SHIPPED | OUTPATIENT
Start: 2023-06-08 | End: 2023-09-06

## 2023-06-08 NOTE — PROGRESS NOTES
Please call  Miles Munoz and tell him that I found he is anemic.  I have sent in a prescription for Iron pills that he will take with breakfast daily.

## 2023-06-09 RX ORDER — ATORVASTATIN CALCIUM 20 MG/1
TABLET, FILM COATED ORAL
Qty: 30 TABLET | Refills: 5 | Status: SHIPPED | OUTPATIENT
Start: 2023-06-09 | End: 2023-06-12 | Stop reason: SDUPTHER

## 2023-06-12 ENCOUNTER — OFFICE VISIT (OUTPATIENT)
Dept: FAMILY MEDICINE CLINIC | Facility: CLINIC | Age: 58
End: 2023-06-12
Payer: COMMERCIAL

## 2023-06-12 VITALS
RESPIRATION RATE: 18 BRPM | WEIGHT: 280 LBS | HEART RATE: 65 BPM | OXYGEN SATURATION: 99 % | SYSTOLIC BLOOD PRESSURE: 138 MMHG | DIASTOLIC BLOOD PRESSURE: 70 MMHG | HEIGHT: 67 IN | BODY MASS INDEX: 43.95 KG/M2
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
## 2023-06-12 DIAGNOSIS — G56.03 BILATERAL CARPAL TUNNEL SYNDROME: ICD-10-CM

## 2023-06-12 DIAGNOSIS — E78.49 HYPERLIPIDEMIA DUE TO DIETARY FAT INTAKE: ICD-10-CM

## 2023-06-12 DIAGNOSIS — S80.862S: ICD-10-CM

## 2023-06-12 DIAGNOSIS — G89.29 CHRONIC BILATERAL LOW BACK PAIN WITHOUT SCIATICA: ICD-10-CM

## 2023-06-12 DIAGNOSIS — M15.9 PRIMARY OSTEOARTHRITIS INVOLVING MULTIPLE JOINTS: ICD-10-CM

## 2023-06-12 DIAGNOSIS — E11.00 TYPE 2 DIABETES MELLITUS WITH HYPEROSMOLARITY WITHOUT COMA, WITHOUT LONG-TERM CURRENT USE OF INSULIN: Primary | ICD-10-CM

## 2023-06-12 DIAGNOSIS — M54.50 CHRONIC BILATERAL LOW BACK PAIN WITHOUT SCIATICA: ICD-10-CM

## 2023-06-12 RX ORDER — ATORVASTATIN CALCIUM 20 MG/1
20 TABLET, FILM COATED ORAL DAILY
Qty: 90 TABLET | Refills: 1 | Status: SHIPPED | OUTPATIENT
Start: 2023-06-12

## 2023-06-12 RX ORDER — GABAPENTIN 300 MG/1
300 CAPSULE ORAL 2 TIMES DAILY
Qty: 180 CAPSULE | Refills: 1 | Status: SHIPPED | OUTPATIENT
Start: 2023-06-12 | End: 2023-12-09

## 2023-06-12 RX ORDER — MELOXICAM 15 MG/1
15 TABLET ORAL DAILY
Qty: 90 TABLET | Refills: 1 | Status: SHIPPED | OUTPATIENT
Start: 2023-06-12

## 2023-06-12 RX ORDER — TOBRAMYCIN AND DEXAMETHASONE 3; 1 MG/ML; MG/ML
1 SUSPENSION/ DROPS OPHTHALMIC
COMMUNITY
Start: 2023-06-07

## 2023-06-12 NOTE — PROGRESS NOTES
Subjective   Miles Munoz is a 57 y.o. male. Presents today for follow-up on cellulitis left lower extremity.      History Of Present Illness      Patient Active Problem List   Diagnosis    Occult blood positive stool    Gastroesophageal reflux disease    Tubulovillous adenoma of colon    Multiple gastric ulcers    Snores    Witnessed apneic spells    Obesity, morbid, BMI 40.0-49.9    Sleep-related bruxism    MARIA A on CPAP    Hypersomnia with sleep apnea    Sleep related hypoxia    Hyperlipidemia    Palpitations    Personal history of colonic polyps    Calcaneal spur    Congenital pes cavus    Pain in limb    Plantar fascial fibromatosis    Prediabetes       Social History     Socioeconomic History    Marital status:    Tobacco Use    Smoking status: Former     Packs/day: 1.00     Years: 46.00     Pack years: 46.00     Types: Cigarettes     Start date: 10/5/1976     Quit date: 2022     Years since quittin.4    Smokeless tobacco: Never    Tobacco comments:     close to being ready   Vaping Use    Vaping Use: Never used   Substance and Sexual Activity    Alcohol use: No     Comment: occ    Drug use: No    Sexual activity: Defer       Allergies   Allergen Reactions    Prednisone Other (See Comments)     Causes muscles to constrict        Current Outpatient Medications on File Prior to Visit   Medication Sig Dispense Refill    cephalexin (Keflex) 500 MG capsule Take 1 capsule by mouth 2 (Two) Times a Day for 10 days. 20 capsule 0    cetirizine (zyrTEC) 10 MG tablet Take 1 tablet by mouth Every Morning.      ferrous gluconate (FERGON) 324 MG tablet Take 1 tablet by mouth Daily With Breakfast for 90 days. 90 tablet 0    mupirocin (BACTROBAN) 2 % ointment APPLY 1 OINTMENT TOPICALLY INTO NOSTRIL AS DIRECTED BY PROVIDER TWICE DAILY FOR 10 DAYS 22 g 0    omeprazole (priLOSEC) 40 MG capsule Take 1 capsule by mouth Daily. (Patient taking differently: Take 20 mg by mouth Every Morning.) 90 capsule 1     "ondansetron ODT (ZOFRAN-ODT) 4 MG disintegrating tablet Place 1 tablet on the tongue Every 8 (Eight) Hours As Needed for Nausea or Vomiting. 90 tablet 2    Semaglutide-Weight Management (Wegovy) 0.5 MG/0.5ML solution auto-injector Inject 0.5 mL under the skin into the appropriate area as directed 1 (One) Time Per Week. ALTERNATE 0.5MG WITH 1MG EACH WEEK 2 mL 0    Semaglutide-Weight Management (Wegovy) 1 MG/0.5ML solution auto-injector Inject 0.5 mL under the skin into the appropriate area as directed 1 (One) Time Per Week. 2 mL 1    Semaglutide-Weight Management (Wegovy) 1.7 MG/0.75ML solution auto-injector Inject 0.75 mL under the skin into the appropriate area as directed 1 (One) Time Per Week. X 4 weeks then go to 2.4mg if tolerates 2 mL 0    Semaglutide-Weight Management (Wegovy) 2.4 MG/0.75ML solution auto-injector Inject 2.4 mg under the skin into the appropriate area as directed 1 (One) Time Per Week. 6 mL 2    sildenafil (VIAGRA) 100 MG tablet Take 1 tablet by mouth Daily As Needed for Erectile Dysfunction. Instructed to withhold med 48 hrs prior to sx      tamsulosin (FLOMAX) 0.4 MG capsule 24 hr capsule Take 1 capsule by mouth Daily. 90 capsule 1    tobramycin-dexamethasone (TOBRADEX) 0.3-0.1 % ophthalmic suspension Administer 1 drop to both eyes Every 2 (Two) Hours While Awake.      [DISCONTINUED] atorvastatin (LIPITOR) 20 MG tablet Take 1 tablet by mouth once daily 30 tablet 5    [DISCONTINUED] gabapentin (NEURONTIN) 300 MG capsule Take 1 capsule by mouth 2 (Two) Times a Day. 180 capsule 0    [DISCONTINUED] meloxicam (MOBIC) 15 MG tablet Take 1 tablet by mouth Daily. 30 tablet 3     No current facility-administered medications on file prior to visit.       Objective   Vitals:    06/12/23 0831   BP: 138/70   Pulse: 65   Resp: 18   SpO2: 99%   Weight: 127 kg (280 lb)   Height: 170.2 cm (67\")   PainSc: 0-No pain     Body mass index is 43.85 kg/m².    Physical Exam  Constitutional:       Appearance: Normal " appearance.   Cardiovascular:      Rate and Rhythm: Normal rate and regular rhythm.   Pulmonary:      Effort: Pulmonary effort is normal.      Breath sounds: Normal breath sounds.   Skin:     General: Skin is warm and dry.      Capillary Refill: Capillary refill takes less than 2 seconds.      Findings: Lesion present.   Neurological:      Mental Status: He is alert.     Procedures     Assessment & Plan   Diagnoses and all orders for this visit:    1. Type 2 diabetes mellitus with hyperosmolarity without coma, without long-term current use of insulin (Primary)  -     Semaglutide-Weight Management 1 MG/0.5ML solution auto-injector; Inject 0.5 mL under the skin into the appropriate area as directed 1 (One) Time Per Week.  Dispense: 2 mL; Refill: 3    2. Bilateral carpal tunnel syndrome  -     gabapentin (NEURONTIN) 300 MG capsule; Take 1 capsule by mouth 2 (Two) Times a Day for 180 days.  Dispense: 180 capsule; Refill: 1    3. Chronic bilateral low back pain without sciatica  -     gabapentin (NEURONTIN) 300 MG capsule; Take 1 capsule by mouth 2 (Two) Times a Day for 180 days.  Dispense: 180 capsule; Refill: 1  -     meloxicam (MOBIC) 15 MG tablet; Take 1 tablet by mouth Daily.  Dispense: 90 tablet; Refill: 1    4. Primary osteoarthritis involving multiple joints  -     meloxicam (MOBIC) 15 MG tablet; Take 1 tablet by mouth Daily.  Dispense: 90 tablet; Refill: 1    5. Hyperlipidemia due to dietary fat intake  -     atorvastatin (LIPITOR) 20 MG tablet; Take 1 tablet by mouth Daily.  Dispense: 90 tablet; Refill: 1    6. Insect bite of left lower extremity, sequela       Insect bite is healing nicely..  The cellulitis present last week is completely resolved.  Continue using mupirocin until wound completely healed.               Return in about 6 months (around 12/12/2023) for Next scheduled follow up.

## 2023-07-24 DIAGNOSIS — E11.00 TYPE 2 DIABETES MELLITUS WITH HYPEROSMOLARITY WITHOUT COMA, WITHOUT LONG-TERM CURRENT USE OF INSULIN: ICD-10-CM

## 2023-07-24 DIAGNOSIS — E66.01 MORBID (SEVERE) OBESITY DUE TO EXCESS CALORIES: ICD-10-CM

## 2023-08-01 ENCOUNTER — OFFICE VISIT (OUTPATIENT)
Dept: SLEEP MEDICINE | Facility: HOSPITAL | Age: 58
End: 2023-08-01
Payer: COMMERCIAL

## 2023-08-01 VITALS
BODY MASS INDEX: 43.32 KG/M2 | SYSTOLIC BLOOD PRESSURE: 160 MMHG | WEIGHT: 276 LBS | DIASTOLIC BLOOD PRESSURE: 72 MMHG | HEART RATE: 83 BPM | HEIGHT: 67 IN | OXYGEN SATURATION: 96 %

## 2023-08-01 DIAGNOSIS — E66.01 OBESITY, MORBID, BMI 40.0-49.9: ICD-10-CM

## 2023-08-01 DIAGNOSIS — Z87.891 EX-SMOKER: ICD-10-CM

## 2023-08-01 DIAGNOSIS — G47.33 OBSTRUCTIVE SLEEP APNEA: Primary | ICD-10-CM

## 2023-08-01 PROCEDURE — G0463 HOSPITAL OUTPT CLINIC VISIT: HCPCS

## 2023-08-04 ENCOUNTER — OFFICE VISIT (OUTPATIENT)
Dept: FAMILY MEDICINE CLINIC | Facility: CLINIC | Age: 58
End: 2023-08-04
Payer: COMMERCIAL

## 2023-08-04 VITALS
WEIGHT: 279 LBS | BODY MASS INDEX: 43.79 KG/M2 | HEIGHT: 67 IN | DIASTOLIC BLOOD PRESSURE: 82 MMHG | SYSTOLIC BLOOD PRESSURE: 120 MMHG | HEART RATE: 74 BPM | OXYGEN SATURATION: 96 %

## 2023-08-04 DIAGNOSIS — Z99.89 OSA ON CPAP: ICD-10-CM

## 2023-08-04 DIAGNOSIS — G47.33 OSA ON CPAP: ICD-10-CM

## 2023-08-04 DIAGNOSIS — R73.03 PREDIABETES: ICD-10-CM

## 2023-08-04 DIAGNOSIS — E78.2 MIXED HYPERLIPIDEMIA: Primary | ICD-10-CM

## 2023-08-04 PROCEDURE — 99213 OFFICE O/P EST LOW 20 MIN: CPT | Performed by: FAMILY MEDICINE

## 2023-08-29 DIAGNOSIS — D50.8 IRON DEFICIENCY ANEMIA DUE TO DIETARY CAUSES: ICD-10-CM

## 2023-08-29 RX ORDER — DOXYCYCLINE HYCLATE 50 MG/1
CAPSULE, GELATIN COATED ORAL
Qty: 90 TABLET | Refills: 0 | Status: SHIPPED | OUTPATIENT
Start: 2023-08-29

## 2023-10-30 DIAGNOSIS — E66.01 MORBID (SEVERE) OBESITY DUE TO EXCESS CALORIES: ICD-10-CM

## 2023-10-30 DIAGNOSIS — E11.00 TYPE 2 DIABETES MELLITUS WITH HYPEROSMOLARITY WITHOUT COMA, WITHOUT LONG-TERM CURRENT USE OF INSULIN: ICD-10-CM

## 2023-10-30 RX ORDER — SILDENAFIL 100 MG/1
100 TABLET, FILM COATED ORAL DAILY PRN
Qty: 30 TABLET | Refills: 5 | Status: SHIPPED | OUTPATIENT
Start: 2023-10-30

## 2023-12-28 ENCOUNTER — TELEPHONE (OUTPATIENT)
Dept: GASTROENTEROLOGY | Facility: CLINIC | Age: 58
End: 2023-12-28
Payer: COMMERCIAL

## 2023-12-28 ENCOUNTER — OFFICE VISIT (OUTPATIENT)
Dept: GASTROENTEROLOGY | Facility: CLINIC | Age: 58
End: 2023-12-28
Payer: COMMERCIAL

## 2023-12-28 VITALS
DIASTOLIC BLOOD PRESSURE: 80 MMHG | HEIGHT: 67 IN | OXYGEN SATURATION: 98 % | SYSTOLIC BLOOD PRESSURE: 131 MMHG | WEIGHT: 270 LBS | BODY MASS INDEX: 42.38 KG/M2 | TEMPERATURE: 96.8 F | HEART RATE: 63 BPM

## 2023-12-28 DIAGNOSIS — R10.9 ABDOMINAL DISCOMFORT: ICD-10-CM

## 2023-12-28 DIAGNOSIS — R10.13 DYSPEPSIA: Primary | ICD-10-CM

## 2023-12-28 DIAGNOSIS — K21.00 GASTROESOPHAGEAL REFLUX DISEASE WITH ESOPHAGITIS: ICD-10-CM

## 2023-12-28 RX ORDER — AMOXICILLIN 500 MG/1
CAPSULE ORAL
COMMUNITY
Start: 2023-10-04

## 2023-12-28 RX ORDER — OMEPRAZOLE 40 MG/1
40 CAPSULE, DELAYED RELEASE ORAL DAILY
Qty: 90 CAPSULE | Refills: 1 | Status: SHIPPED | OUTPATIENT
Start: 2023-12-28

## 2023-12-28 RX ORDER — HYDROCODONE BITARTRATE AND ACETAMINOPHEN 7.5; 325 MG/1; MG/1
TABLET ORAL
COMMUNITY
Start: 2023-09-19

## 2023-12-28 NOTE — PROGRESS NOTES
"Chief Complaint  Abdominal Pain (Concerned about possibly having ulcer) and Heartburn    Subjective          History of Present Illness    Miles Munoz is a  58 y.o. male presents for follow-up today.  He is a patient of Dr. Leopoldo Thompson and is new to me.    Patient reports abdominal discomfort for several months. Says he started on Wagovy for weight loss in February/March which caused a lot of abdominal discomfort and nausea so he switched to Ozempic which is slightly better. Says he can taste reflux in his mouth. Symptoms present throughout day and night. Found some relief with pepto bismol. No vomiting, hematemesis, coffee ground emesis, melena, hematochezia. Takes prilosec 20mg once daily, but is having breakthrough symptoms.   He denies any dysphagia, odynophagia, black stools, bloody stools, hematemesis/coffee-ground emesis.  He has lost some weight since starting Ozempic, but he says this has been less than 10 pounds.    EGD 1/15/2018 showed regular Z line, normal espohagus, non bleeding gastric ulcers, non bleeding erosive gastropathy, normal examined duodenum.     Colonoscopy 4/27/21 showed normal entire examined colon. Repeat 5 years.     No family hx gastric cancer/coloncancer.     Recently quit smoking January 2023.       Objective   Vital Signs:   /80   Pulse 63   Temp 96.8 °F (36 °C) (Temporal)   Ht 170.2 cm (67\")   Wt 122 kg (270 lb)   SpO2 98%   BMI 42.29 kg/m²       Physical Exam  Constitutional:       General: He is not in acute distress.     Appearance: Normal appearance.   Eyes:      General: No scleral icterus.  Cardiovascular:      Rate and Rhythm: Normal rate.   Pulmonary:      Effort: Pulmonary effort is normal.   Abdominal:      General: Abdomen is flat. Bowel sounds are normal. There is no distension.      Tenderness: There is no abdominal tenderness. There is no guarding.   Skin:     Coloration: Skin is not jaundiced.   Neurological:      General: No focal deficit present. "      Mental Status: He is alert and oriented to person, place, and time.   Psychiatric:         Mood and Affect: Mood normal.         Behavior: Behavior normal.          Result Review :   The following data was reviewed by: Otilia Elias PA-C on 12/28/2023:  CMP          2/2/2023    16:06 6/6/2023    00:00   CMP   Glucose 110  119    BUN 12  11    Creatinine 0.97  0.97    Sodium 143  145    Potassium 3.8  4.0    Chloride 105  109    Calcium 8.7  8.1    Total Protein 6.7  6.4    Albumin 3.9  4.1    Globulin 2.8  2.3    Total Bilirubin 0.4  0.3    Alkaline Phosphatase 71  80    AST (SGOT) 16  17    ALT (SGPT) 22  18    BUN/Creatinine Ratio 12  11      CBC          6/6/2023    00:00   CBC   WBC 6.5    RBC 4.59    Hemoglobin 10.6    Hematocrit 34.6    MCV 75    MCH 23.1    MCHC 30.6    RDW 14.8    Platelets 243              Assessment:   Diagnoses and all orders for this visit:    1. Dyspepsia (Primary)  -     Case Request; Standing  -     Case Request    2. Abdominal discomfort  -     Case Request; Standing  -     Case Request    3. Gastroesophageal reflux disease with esophagitis  -     omeprazole (priLOSEC) 40 MG capsule; Take 1 capsule by mouth Daily.  Dispense: 90 capsule; Refill: 1    Other orders  -     Implement Anesthesia orders day of procedure.; Standing  -     Obtain informed consent; Standing  -     Follow Anesthesia Guidelines / Protocol; Future  -     Obtain Informed Consent; Future          Plan:   Increase prilosec to 40mg daily with Pepcid nightly   Abdominal discomfort/reflux could be due to Ozempic. Will plan for EGD given worsening of symptoms and history of gastritis/gastric ulcers.   Risks (including, but not limited to perforation, bleeding, sedation-related complications, and death), benefits, and alternatives to the procedure were discussed with the patient and all questions were answered.   Repeat colonoscopy 5 years -I did note patient was recently found to have iron deficiency anemia.   This appears to have been present back in 2019 as well.  He is on iron supplementation per PCP.  Will discuss colonoscopy follow-up with Dr. Thompson    Addendum: Would like to proceed with colonoscopy given iron deficiency anemia, discussed this with patient who does not want to proceed with colonoscopy at this time.  He says he has been anemic for many years and would rather just wait and see what his blood levels do and only proceed with EGD.  Patient informed that we would like to do colonoscopy to rule out any masses or inflammation that may be contributing to his anemia ultimately he would like to hold off for now.    Follow Up   No follow-ups on file.    Dragon dictation used throughout this note.         Otilia Elias PA-C  [facility]  [address] [place] [address]  Office: [phone]

## 2023-12-28 NOTE — Clinical Note
57 yo M presenting for f/u for worsening reflux and abdominal discomfort for several months.  He started Ozempic for weight loss back in February/March and noticed abdominal discomfort at that time, but says that it has increased over the last month to the point where he is frequently tasting reflux in his mouth.  He has had some relief with Pepto-Bismol and takes 20 mg Prilosec daily but is still having symptoms.  No difficulty swallowing, appropriate weight loss with being on Ozempic, no black or bloody stools.  EGD in 2018 showing nonbleeding gastric ulcers and erosive gastropathy.  We discussed EGD to evaluate worsening symptoms but I also informed him that his symptoms may be due to Ozempic.  I also saw that he has iron def anemia as noted in labs from June. This was present on labs in 2019 as well but resolved between 2019 and now.  He had a colonoscopy in 2021 which was unremarkable with recs for repeat in 5 yrs. Should this be done sooner with iron deficiency or is it okay to wait?

## 2024-01-06 DIAGNOSIS — E78.49 HYPERLIPIDEMIA DUE TO DIETARY FAT INTAKE: ICD-10-CM

## 2024-01-06 RX ORDER — ATORVASTATIN CALCIUM 20 MG/1
20 TABLET, FILM COATED ORAL DAILY
Qty: 90 TABLET | Refills: 0 | Status: SHIPPED | OUTPATIENT
Start: 2024-01-06

## 2024-01-07 DIAGNOSIS — M54.50 CHRONIC BILATERAL LOW BACK PAIN WITHOUT SCIATICA: ICD-10-CM

## 2024-01-07 DIAGNOSIS — G89.29 CHRONIC BILATERAL LOW BACK PAIN WITHOUT SCIATICA: ICD-10-CM

## 2024-01-07 DIAGNOSIS — G56.03 BILATERAL CARPAL TUNNEL SYNDROME: ICD-10-CM

## 2024-01-12 DIAGNOSIS — G89.29 CHRONIC BILATERAL LOW BACK PAIN WITHOUT SCIATICA: ICD-10-CM

## 2024-01-12 DIAGNOSIS — G56.03 BILATERAL CARPAL TUNNEL SYNDROME: ICD-10-CM

## 2024-01-12 DIAGNOSIS — M54.50 CHRONIC BILATERAL LOW BACK PAIN WITHOUT SCIATICA: ICD-10-CM

## 2024-01-15 DIAGNOSIS — M54.50 CHRONIC BILATERAL LOW BACK PAIN WITHOUT SCIATICA: ICD-10-CM

## 2024-01-15 DIAGNOSIS — G56.03 BILATERAL CARPAL TUNNEL SYNDROME: ICD-10-CM

## 2024-01-15 DIAGNOSIS — G89.29 CHRONIC BILATERAL LOW BACK PAIN WITHOUT SCIATICA: ICD-10-CM

## 2024-01-15 RX ORDER — GABAPENTIN 300 MG/1
300 CAPSULE ORAL 2 TIMES DAILY
Qty: 60 CAPSULE | Refills: 2 | Status: SHIPPED | OUTPATIENT
Start: 2024-01-15

## 2024-01-15 RX ORDER — GABAPENTIN 300 MG/1
300 CAPSULE ORAL 2 TIMES DAILY
Qty: 180 CAPSULE | Refills: 1 | Status: SHIPPED | OUTPATIENT
Start: 2024-01-15 | End: 2024-07-13

## 2024-01-17 DIAGNOSIS — G89.29 CHRONIC BILATERAL LOW BACK PAIN WITHOUT SCIATICA: ICD-10-CM

## 2024-01-17 DIAGNOSIS — M54.50 CHRONIC BILATERAL LOW BACK PAIN WITHOUT SCIATICA: ICD-10-CM

## 2024-01-17 DIAGNOSIS — M15.9 PRIMARY OSTEOARTHRITIS INVOLVING MULTIPLE JOINTS: ICD-10-CM

## 2024-01-17 RX ORDER — MELOXICAM 15 MG/1
15 TABLET ORAL DAILY
Qty: 90 TABLET | Refills: 0 | Status: SHIPPED | OUTPATIENT
Start: 2024-01-17

## 2024-02-05 ENCOUNTER — HOSPITAL ENCOUNTER (OUTPATIENT)
Facility: HOSPITAL | Age: 59
Setting detail: HOSPITAL OUTPATIENT SURGERY
Discharge: HOME | End: 2024-02-05
Attending: INTERNAL MEDICINE | Admitting: INTERNAL MEDICINE
Payer: COMMERCIAL

## 2024-02-05 ENCOUNTER — ANESTHESIA EVENT (OUTPATIENT)
Dept: GASTROENTEROLOGY | Facility: HOSPITAL | Age: 59
End: 2024-02-05
Payer: COMMERCIAL

## 2024-02-05 ENCOUNTER — ANESTHESIA (OUTPATIENT)
Dept: GASTROENTEROLOGY | Facility: HOSPITAL | Age: 59
End: 2024-02-05
Payer: COMMERCIAL

## 2024-02-05 VITALS
SYSTOLIC BLOOD PRESSURE: 137 MMHG | WEIGHT: 274.2 LBS | BODY MASS INDEX: 44.07 KG/M2 | HEIGHT: 66 IN | DIASTOLIC BLOOD PRESSURE: 72 MMHG | OXYGEN SATURATION: 96 % | RESPIRATION RATE: 16 BRPM | HEART RATE: 58 BPM

## 2024-02-05 DIAGNOSIS — R10.9 ABDOMINAL DISCOMFORT: ICD-10-CM

## 2024-02-05 DIAGNOSIS — R10.13 DYSPEPSIA: ICD-10-CM

## 2024-02-05 PROCEDURE — 25810000003 LACTATED RINGERS PER 1000 ML: Performed by: INTERNAL MEDICINE

## 2024-02-05 PROCEDURE — 25010000002 GLYCOPYRROLATE 0.2 MG/ML SOLUTION

## 2024-02-05 PROCEDURE — 88305 TISSUE EXAM BY PATHOLOGIST: CPT | Performed by: INTERNAL MEDICINE

## 2024-02-05 PROCEDURE — 25010000002 PROPOFOL 10 MG/ML EMULSION

## 2024-02-05 RX ORDER — SODIUM CHLORIDE 0.9 % (FLUSH) 0.9 %
10 SYRINGE (ML) INJECTION AS NEEDED
Status: DISCONTINUED | OUTPATIENT
Start: 2024-02-05 | End: 2024-02-05 | Stop reason: HOSPADM

## 2024-02-05 RX ORDER — GLYCOPYRROLATE 0.2 MG/ML
INJECTION INTRAMUSCULAR; INTRAVENOUS AS NEEDED
Status: DISCONTINUED | OUTPATIENT
Start: 2024-02-05 | End: 2024-02-05 | Stop reason: SURG

## 2024-02-05 RX ORDER — LIDOCAINE HYDROCHLORIDE 20 MG/ML
INJECTION, SOLUTION INFILTRATION; PERINEURAL AS NEEDED
Status: DISCONTINUED | OUTPATIENT
Start: 2024-02-05 | End: 2024-02-05 | Stop reason: SURG

## 2024-02-05 RX ORDER — PROPOFOL 10 MG/ML
VIAL (ML) INTRAVENOUS AS NEEDED
Status: DISCONTINUED | OUTPATIENT
Start: 2024-02-05 | End: 2024-02-05 | Stop reason: SURG

## 2024-02-05 RX ORDER — SODIUM CHLORIDE, SODIUM LACTATE, POTASSIUM CHLORIDE, CALCIUM CHLORIDE 600; 310; 30; 20 MG/100ML; MG/100ML; MG/100ML; MG/100ML
1000 INJECTION, SOLUTION INTRAVENOUS CONTINUOUS
Status: DISCONTINUED | OUTPATIENT
Start: 2024-02-05 | End: 2024-02-05 | Stop reason: HOSPADM

## 2024-02-05 RX ADMIN — GLYCOPYRROLATE 0.2 MG: 0.2 INJECTION INTRAMUSCULAR; INTRAVENOUS at 07:37

## 2024-02-05 RX ADMIN — PROPOFOL 180 MCG/KG/MIN: 10 INJECTION, EMULSION INTRAVENOUS at 07:37

## 2024-02-05 RX ADMIN — PROPOFOL 100 MG: 10 INJECTION, EMULSION INTRAVENOUS at 07:35

## 2024-02-05 RX ADMIN — LIDOCAINE HYDROCHLORIDE 60 MG: 20 INJECTION, SOLUTION INFILTRATION; PERINEURAL at 07:35

## 2024-02-05 RX ADMIN — SODIUM CHLORIDE, POTASSIUM CHLORIDE, SODIUM LACTATE AND CALCIUM CHLORIDE 1000 ML: 600; 310; 30; 20 INJECTION, SOLUTION INTRAVENOUS at 07:21

## 2024-02-05 NOTE — ANESTHESIA PREPROCEDURE EVALUATION
Anesthesia Evaluation     no history of anesthetic complications:                Airway   Mallampati: II  TM distance: >3 FB  Neck ROM: full  Small opening and Large neck circumference  Dental - normal exam     Pulmonary    (+) a smoker,sleep apnea on CPAP  (-) shortness of breath, recent URI  Cardiovascular     (+) hyperlipidemia  (-) dysrhythmias, angina      Neuro/Psych  (-) dizziness/light headedness, syncope  GI/Hepatic/Renal/Endo    (+) morbid obesity, GERD, PUD, renal disease- stones  (-) diabetes    Musculoskeletal     (+) back pain  Abdominal    Substance History      OB/GYN          Other                    Anesthesia Plan    ASA 3     MAC     intravenous induction     Anesthetic plan, risks, benefits, and alternatives have been provided, discussed and informed consent has been obtained with: patient.    CODE STATUS:

## 2024-02-05 NOTE — ANESTHESIA POSTPROCEDURE EVALUATION
Patient: Miles Munoz    Procedure Summary       Date: 02/05/24 Room / Location:  [facility]    Anesthesia Start: 0730 Anesthesia Stop: 0748    Procedure: ESOPHAGOGASTRODUODENOSCOPY WITH COLD BIOPSIES (Esophagus) Diagnosis:       Dyspepsia      Abdominal discomfort      (Dyspepsia [R10.13])      (Abdominal discomfort [R10.9])    Surgeons: Leopoldo Thompson MD Provider: Karen Schafer MD    Anesthesia Type: MAC ASA Status: 3            Anesthesia Type: MAC    Vitals  Vitals Value Taken Time   /76 02/05/24 0756   Temp     Pulse 60 02/05/24 0802   Resp 16 02/05/24 0755   SpO2 97 % 02/05/24 0802   Vitals shown include unfiled device data.        Post Anesthesia Care and Evaluation    Anesthetic complications: No anesthetic complications

## 2024-02-05 NOTE — H&P
[facility]  Pre Procedure History & Physical    Chief Complaint:   Dyspepsia    Subjective     HPI:   58 y.o. male here for EGD for evaluation of dyspepsia    Past Medical History:   Past Medical History:   Diagnosis Date    Allergic rhinitis     Arthritis     Back pain     GERD (gastroesophageal reflux disease)     Hyperlipidemia     Renal stone     Seasonal allergies     Sleep apnea     compliant with machine... DR. RAY TOLD PT HE DIDN'T NEED TO BRING..  I INSTRUCTED TO BRING       Past Surgical History:  Past Surgical History:   Procedure Laterality Date    CARPAL TUNNEL RELEASE Right 1/25/2019    Procedure: DECOMPRESSION OF CARPAL TUNNEL RT HAND;  Surgeon: Miles Ray MD;  Location:  [facility];  Service: Plastics    CARPAL TUNNEL RELEASE Left 4/26/2019    Procedure: DECOMPRESSION OF CARPAL TUNNEL LEFT HAND;  Surgeon: Miles Ray MD;  Location:  [facility];  Service: Plastics    COLONOSCOPY N/A 4/27/2021    Procedure: COLONOSCOPY TO CECUM;  Surgeon: Leopoldo Thompson MD;  [facility];  Service: Gastroenterology;  Laterality: N/A;  PRE: PERSONAL HX COLON POLYPS  POST: NORMAL    COLONOSCOPY W/ POLYPECTOMY N/A 1/15/2018    Procedure: COLONOSCOPY WITH POLYPECTOMY TO CECUM HOT SNARE;  Surgeon: Leopoldo Thompson MD;  [facility];  Service:     CYSTOSCOPY      for renal stones    ENDOSCOPY N/A 1/15/2018    Procedure: ESOPHAGOGASTRODUODENOSCOPY WITH BIOPSY;  Surgeon: Leopoldo Thompson MD;  [facility];  Service:     VASECTOMY      WISDOM TOOTH EXTRACTION         Family History:  Family History   Problem Relation Age of Onset    Heart failure Mother         43 yoa    Obesity Mother         reports very severe morbid obesity    Malig Hyperthermia Neg Hx        Social History:   reports that he quit smoking about 13 months ago. His smoking use included cigarettes. He started smoking about 47 years ago. He has a 46.00  pack-year smoking history. He has never used smokeless tobacco. He reports current alcohol use of about 1.0 standard drink of alcohol per week. He reports that he does not use drugs.    Medications:   Medications Prior to Admission   Medication Sig Dispense Refill Last Dose    gabapentin (NEURONTIN) 300 MG capsule Take 1 capsule by mouth twice daily 60 capsule 2     amoxicillin (AMOXIL) 500 MG capsule TAKE 1 CAPSULE BY MOUTH 4 TIMES DAILY UNTIL GONE (Patient not taking: Reported on 12/28/2023)       atorvastatin (LIPITOR) 20 MG tablet Take 1 tablet by mouth once daily 90 tablet 0     cetirizine (zyrTEC) 10 MG tablet Take 1 tablet by mouth Every Morning.       ferrous gluconate (FERGON) 324 MG tablet TAKE 1 TABLET BY MOUTH ONCE DAILY WITH BREAKFAST FOR 90 DAYS (Patient not taking: Reported on 12/28/2023) 90 tablet 0     gabapentin (NEURONTIN) 300 MG capsule Take 1 capsule by mouth 2 (Two) Times a Day for 180 days. 180 capsule 1     gabapentin (NEURONTIN) 300 MG capsule Take 1 capsule by mouth 2 (Two) Times a Day for 180 days. 180 capsule 1     HYDROcodone-acetaminophen (NORCO) 7.5-325 MG per tablet take 1 tablet by mouth every 4 to 6 hours as needed (Patient not taking: Reported on 12/28/2023)       meloxicam (MOBIC) 15 MG tablet Take 1 tablet by mouth once daily 90 tablet 0     omeprazole (priLOSEC) 40 MG capsule Take 1 capsule by mouth Daily. 90 capsule 1     ondansetron ODT (ZOFRAN-ODT) 4 MG disintegrating tablet Place 1 tablet on the tongue Every 8 (Eight) Hours As Needed for Nausea or Vomiting. 90 tablet 2     Semaglutide, 1 MG/DOSE, (OZEMPIC) 4 MG/3ML solution pen-injector Inject 1 mg under the skin into the appropriate area as directed 1 (One) Time Per Week. 2 mL 2     sildenafil (VIAGRA) 100 MG tablet Take 1 tablet by mouth Daily As Needed for Erectile Dysfunction. Instructed to withhold med 48 hrs prior to sx 30 tablet 5     tamsulosin (FLOMAX) 0.4 MG capsule 24 hr capsule Take 1 capsule by mouth Daily. 90  "capsule 1     tobramycin-dexAMETHasone (TOBRADEX) 0.3-0.1 % ophthalmic suspension Administer 1 drop to both eyes Every 2 (Two) Hours While Awake.          Allergies:  Prednisone    ROS:    Pertinent items are noted in HPI     Objective     Height 167.6 cm (66\"), weight 124 kg (274 lb 3.2 oz).    Physical Exam   Constitutional: Pt is oriented to person, place, and time and well-developed, well-nourished, and in no distress.   Mouth/Throat: Oropharynx is clear and moist.   Neck: Normal range of motion.   Cardiovascular: Normal rate, regular rhythm and normal heart sounds.    Pulmonary/Chest: Effort normal and breath sounds normal.   Abdominal: Soft. Nontender  Skin: Skin is warm and dry.   Psychiatric: Mood, memory, affect and judgment normal.     Assessment & Plan     Diagnosis:  Dyspepsia    Anticipated Surgical Procedure:  EGD    The risks, benefits, and alternatives of this procedure have been discussed with the patient or the responsible party- the patient understands and agrees to proceed.                                                            "

## 2024-02-06 LAB
LAB AP CASE REPORT: NORMAL
PATH REPORT.FINAL DX SPEC: NORMAL
PATH REPORT.GROSS SPEC: NORMAL

## 2024-02-12 ENCOUNTER — OFFICE VISIT (OUTPATIENT)
Dept: FAMILY MEDICINE CLINIC | Facility: CLINIC | Age: 59
End: 2024-02-12
Payer: COMMERCIAL

## 2024-02-12 VITALS
DIASTOLIC BLOOD PRESSURE: 62 MMHG | WEIGHT: 276 LBS | HEIGHT: 66 IN | OXYGEN SATURATION: 97 % | HEART RATE: 66 BPM | BODY MASS INDEX: 44.36 KG/M2 | SYSTOLIC BLOOD PRESSURE: 135 MMHG

## 2024-02-12 DIAGNOSIS — J30.1 NON-SEASONAL ALLERGIC RHINITIS DUE TO POLLEN: Primary | ICD-10-CM

## 2024-02-12 DIAGNOSIS — G47.33 OSA ON CPAP: ICD-10-CM

## 2024-02-12 DIAGNOSIS — E78.2 MIXED HYPERLIPIDEMIA: ICD-10-CM

## 2024-02-12 DIAGNOSIS — Z00.00 WELLNESS EXAMINATION: Primary | ICD-10-CM

## 2024-02-12 DIAGNOSIS — E66.01 MORBID (SEVERE) OBESITY DUE TO EXCESS CALORIES: ICD-10-CM

## 2024-02-12 DIAGNOSIS — R73.03 PREDIABETES: ICD-10-CM

## 2024-02-12 PROCEDURE — 99396 PREV VISIT EST AGE 40-64: CPT | Performed by: FAMILY MEDICINE

## 2024-02-12 RX ORDER — AZELASTINE HYDROCHLORIDE, FLUTICASONE PROPIONATE 137; 50 UG/1; UG/1
1 SPRAY, METERED NASAL 2 TIMES DAILY
Qty: 23 G | Refills: 12 | Status: SHIPPED | OUTPATIENT
Start: 2024-02-12

## 2024-02-12 RX ORDER — PHENTERMINE HYDROCHLORIDE 37.5 MG/1
37.5 TABLET ORAL
Qty: 30 TABLET | Refills: 2 | Status: SHIPPED | OUTPATIENT
Start: 2024-02-12

## 2024-02-12 RX ORDER — TOPIRAMATE 25 MG/1
25 TABLET ORAL 2 TIMES DAILY
Qty: 60 TABLET | Refills: 5 | Status: SHIPPED | OUTPATIENT
Start: 2024-02-12

## 2024-03-06 ENCOUNTER — TELEPHONE (OUTPATIENT)
Dept: GASTROENTEROLOGY | Facility: CLINIC | Age: 59
End: 2024-03-06
Payer: COMMERCIAL

## 2024-03-06 NOTE — TELEPHONE ENCOUNTER
Results sent via [facility].       ----- Message from Leopoldo Thompson MD sent at 3/5/2024 12:47 PM EST -----  Benign  Office f/u in 6-8 weeks

## 2024-03-07 DIAGNOSIS — E78.49 HYPERLIPIDEMIA DUE TO DIETARY FAT INTAKE: ICD-10-CM

## 2024-03-07 RX ORDER — ATORVASTATIN CALCIUM 20 MG/1
20 TABLET, FILM COATED ORAL DAILY
Qty: 90 TABLET | Refills: 0 | Status: SHIPPED | OUTPATIENT
Start: 2024-03-07

## 2024-03-14 RX ORDER — TAMSULOSIN HYDROCHLORIDE 0.4 MG/1
1 CAPSULE ORAL DAILY
Qty: 30 CAPSULE | Refills: 11 | Status: SHIPPED | OUTPATIENT
Start: 2024-03-14

## 2024-03-15 ENCOUNTER — OFFICE VISIT (OUTPATIENT)
Dept: FAMILY MEDICINE CLINIC | Facility: CLINIC | Age: 59
End: 2024-03-15
Payer: COMMERCIAL

## 2024-03-15 VITALS
WEIGHT: 262.8 LBS | HEIGHT: 66 IN | HEART RATE: 66 BPM | TEMPERATURE: 97.5 F | OXYGEN SATURATION: 97 % | SYSTOLIC BLOOD PRESSURE: 144 MMHG | DIASTOLIC BLOOD PRESSURE: 72 MMHG | BODY MASS INDEX: 42.23 KG/M2

## 2024-03-15 DIAGNOSIS — B96.89 ACUTE BACTERIAL SINUSITIS: Primary | ICD-10-CM

## 2024-03-15 DIAGNOSIS — J01.90 ACUTE BACTERIAL SINUSITIS: Primary | ICD-10-CM

## 2024-03-15 PROCEDURE — 99213 OFFICE O/P EST LOW 20 MIN: CPT | Performed by: NURSE PRACTITIONER

## 2024-03-15 RX ORDER — AMOXICILLIN AND CLAVULANATE POTASSIUM 875; 125 MG/1; MG/1
1 TABLET, FILM COATED ORAL 2 TIMES DAILY
Qty: 20 TABLET | Refills: 0 | Status: SHIPPED | OUTPATIENT
Start: 2024-03-15

## 2024-03-15 NOTE — LETTER
March 15, 2024     Patient: Miles Munoz   YOB: 1965   Date of Visit: 3/15/2024       To Whom It May Concern:    It is my medical opinion that Miles Munoz may return to full duty immediately with no restrictions.           Sincerely,        ALIA Maher    CC:   No Recipients

## 2024-03-15 NOTE — PROGRESS NOTES
Subjective   Miles Munoz is a 58 y.o. male. Presents today for sinus infection  Chief Complaint   Patient presents with    Sinusitis     Foul Order (mildew) when breathes through nose   ?  Sinus Infection  Has used Sinus Rinse  Helps some       History Of Present Illness:    Sinus congestion with green secretions x 9 days.  He has used netti pot and Afrin Nasal spray.      Patient Active Problem List   Diagnosis    Occult blood positive stool    Gastroesophageal reflux disease    Tubulovillous adenoma of colon    Multiple gastric ulcers    Snores    Witnessed apneic spells    Obesity, morbid, BMI 40.0-49.9    Sleep-related bruxism    MARIA A on CPAP    Hypersomnia with sleep apnea    Sleep related hypoxia    Hyperlipidemia    Palpitations    Personal history of colonic polyps    Calcaneal spur    Congenital pes cavus    Pain in limb    Plantar fascial fibromatosis    Prediabetes    Dyspepsia    Abdominal discomfort       Social History     Socioeconomic History    Marital status:    Tobacco Use    Smoking status: Former     Current packs/day: 0.00     Average packs/day: 1 pack/day for 46.2 years (46.2 ttl pk-yrs)     Types: Cigarettes     Start date: 10/5/1976     Quit date: 2022     Years since quittin.2    Smokeless tobacco: Never    Tobacco comments:     close to being ready   Vaping Use    Vaping status: Never Used   Substance and Sexual Activity    Alcohol use: Yes     Alcohol/week: 1.0 standard drink of alcohol     Types: 1 Drinks containing 0.5 oz of alcohol per week     Comment: rarely    Drug use: No    Sexual activity: Defer       Allergies   Allergen Reactions    Prednisone Other (See Comments)     Causes muscles to constrict        Current Outpatient Medications on File Prior to Visit   Medication Sig Dispense Refill    atorvastatin (LIPITOR) 20 MG tablet Take 1 tablet by mouth once daily 90 tablet 0    Azelastine-Fluticasone 137-50 MCG/ACT suspension 1 spray into the nostril(s) as  "directed by provider 2 (Two) Times a Day. 23 g 12    cetirizine (zyrTEC) 10 MG tablet Take 1 tablet by mouth Every Morning.      ferrous gluconate (FERGON) 324 MG tablet TAKE 1 TABLET BY MOUTH ONCE DAILY WITH BREAKFAST FOR 90 DAYS 90 tablet 0    HYDROcodone-acetaminophen (NORCO) 7.5-325 MG per tablet       meloxicam (MOBIC) 15 MG tablet Take 1 tablet by mouth once daily 90 tablet 0    omeprazole (priLOSEC) 40 MG capsule Take 1 capsule by mouth Daily. 90 capsule 1    ondansetron ODT (ZOFRAN-ODT) 4 MG disintegrating tablet Place 1 tablet on the tongue Every 8 (Eight) Hours As Needed for Nausea or Vomiting. 90 tablet 2    phentermine (ADIPEX-P) 37.5 MG tablet Take 1 tablet by mouth Every Morning Before Breakfast. 30 tablet 2    Semaglutide, 1 MG/DOSE, (OZEMPIC) 4 MG/3ML solution pen-injector Inject 1 mg under the skin into the appropriate area as directed 1 (One) Time Per Week. 2 mL 2    sildenafil (VIAGRA) 100 MG tablet Take 1 tablet by mouth Daily As Needed for Erectile Dysfunction. Instructed to withhold med 48 hrs prior to sx 30 tablet 5    tamsulosin (FLOMAX) 0.4 MG capsule 24 hr capsule Take 1 capsule by mouth once daily 30 capsule 11    tobramycin-dexAMETHasone (TOBRADEX) 0.3-0.1 % ophthalmic suspension Administer 1 drop to both eyes Every 2 (Two) Hours While Awake.      topiramate (TOPAMAX) 25 MG tablet Take 1 tablet by mouth 2 (Two) Times a Day. 60 tablet 5    triazolam (HALCION) 0.25 MG tablet TAKE 1 TABLET BY MOUTH AT NIGHT THEN ONE TABLET IN THE OFFICE DAY OF PROCEDURE AND KEEP ADDITIONAL IF NECESSARY FOR APPOINTMENT      [DISCONTINUED] gabapentin (NEURONTIN) 300 MG capsule Take 1 capsule by mouth twice daily (Patient not taking: Reported on 3/15/2024) 60 capsule 2     No current facility-administered medications on file prior to visit.       Objective   Vitals:    03/15/24 0856   BP: 144/72   Pulse: 66   Temp: 97.5 °F (36.4 °C)   SpO2: 97%   Weight: 119 kg (262 lb 12.8 oz)   Height: 167.6 cm (66\")     Body " mass index is 42.42 kg/m².    Physical Exam  Constitutional:       Appearance: He is obese.   HENT:      Head: Normocephalic and atraumatic.      Mouth/Throat:      Mouth: Mucous membranes are moist.   Eyes:      Pupils: Pupils are equal, round, and reactive to light.   Cardiovascular:      Rate and Rhythm: Normal rate and regular rhythm.      Pulses: Normal pulses.      Heart sounds: Normal heart sounds.   Pulmonary:      Effort: Pulmonary effort is normal.      Breath sounds: Normal breath sounds.   Abdominal:      General: Abdomen is flat.   Musculoskeletal:         General: Normal range of motion.   Skin:     General: Skin is warm and dry.      Capillary Refill: Capillary refill takes less than 2 seconds.   Neurological:      General: No focal deficit present.      Mental Status: He is alert.   Psychiatric:         Mood and Affect: Mood normal.     Procedures     Assessment & Plan   Diagnoses and all orders for this visit:    1. Acute bacterial sinusitis (Primary)  -     amoxicillin-clavulanate (AUGMENTIN) 875-125 MG per tablet; Take 1 tablet by mouth 2 (Two) Times a Day.  Dispense: 20 tablet; Refill: 0        Discussed Care Gaps, ordered referrals and encouraged vaccination updates.       - Pt agrees with plan of care and denies further questions/concerns today  - This document is intended for medical expert use only. Persons  reading this document without medical staff guidance may result in misinterpretation and unintended morbidity     Go to the ER for any possible life-threatening symptoms such as chest pain or shortness of air.      Please allow 3-5 business days for recommendations based on new results      I personally spent time with this patient, preparing for the visit, reviewing tests, obtaining and/or reviewing a separately obtained history, performing a medically appropriate examination and/or evaluation, counseling and educating the patient/family/caregiver, ordering medications,  documenting  information in the medical record and indepentently interpreting results.           Return if symptoms worsen or fail to improve.

## 2024-04-09 DIAGNOSIS — M54.50 CHRONIC BILATERAL LOW BACK PAIN WITHOUT SCIATICA: ICD-10-CM

## 2024-04-09 DIAGNOSIS — M15.9 PRIMARY OSTEOARTHRITIS INVOLVING MULTIPLE JOINTS: ICD-10-CM

## 2024-04-09 DIAGNOSIS — G89.29 CHRONIC BILATERAL LOW BACK PAIN WITHOUT SCIATICA: ICD-10-CM

## 2024-04-09 RX ORDER — MELOXICAM 15 MG/1
15 TABLET ORAL DAILY
Qty: 90 TABLET | Refills: 0 | Status: SHIPPED | OUTPATIENT
Start: 2024-04-09

## 2024-04-14 DIAGNOSIS — E11.00 TYPE 2 DIABETES MELLITUS WITH HYPEROSMOLARITY WITHOUT COMA, WITHOUT LONG-TERM CURRENT USE OF INSULIN: ICD-10-CM

## 2024-04-14 DIAGNOSIS — E66.01 MORBID (SEVERE) OBESITY DUE TO EXCESS CALORIES: ICD-10-CM

## 2024-04-15 RX ORDER — SEMAGLUTIDE 1.34 MG/ML
INJECTION, SOLUTION SUBCUTANEOUS
Qty: 3 ML | Refills: 0 | Status: SHIPPED | OUTPATIENT
Start: 2024-04-15

## 2024-04-25 DIAGNOSIS — K21.00 GASTROESOPHAGEAL REFLUX DISEASE WITH ESOPHAGITIS: ICD-10-CM

## 2024-04-25 DIAGNOSIS — E66.01 MORBID (SEVERE) OBESITY DUE TO EXCESS CALORIES: ICD-10-CM

## 2024-04-25 DIAGNOSIS — E11.00 TYPE 2 DIABETES MELLITUS WITH HYPEROSMOLARITY WITHOUT COMA, WITHOUT LONG-TERM CURRENT USE OF INSULIN: ICD-10-CM

## 2024-04-25 RX ORDER — SEMAGLUTIDE 1.34 MG/ML
1 INJECTION, SOLUTION SUBCUTANEOUS WEEKLY
Qty: 3 ML | Refills: 1 | Status: SHIPPED | OUTPATIENT
Start: 2024-04-25

## 2024-04-25 RX ORDER — OMEPRAZOLE 40 MG/1
40 CAPSULE, DELAYED RELEASE ORAL DAILY
Qty: 90 CAPSULE | Refills: 1 | Status: SHIPPED | OUTPATIENT
Start: 2024-04-25

## 2024-05-13 ENCOUNTER — OFFICE VISIT (OUTPATIENT)
Dept: FAMILY MEDICINE CLINIC | Facility: CLINIC | Age: 59
End: 2024-05-13
Payer: COMMERCIAL

## 2024-05-13 VITALS
HEART RATE: 61 BPM | DIASTOLIC BLOOD PRESSURE: 80 MMHG | OXYGEN SATURATION: 99 % | BODY MASS INDEX: 40.92 KG/M2 | SYSTOLIC BLOOD PRESSURE: 132 MMHG | HEIGHT: 68 IN | WEIGHT: 270 LBS

## 2024-05-13 DIAGNOSIS — E78.2 MIXED HYPERLIPIDEMIA: ICD-10-CM

## 2024-05-13 DIAGNOSIS — E66.01 MORBID OBESITY: ICD-10-CM

## 2024-05-13 DIAGNOSIS — R53.83 OTHER FATIGUE: ICD-10-CM

## 2024-05-13 DIAGNOSIS — E66.01 MORBID (SEVERE) OBESITY DUE TO EXCESS CALORIES: ICD-10-CM

## 2024-05-13 DIAGNOSIS — E11.9 TYPE 2 DIABETES MELLITUS WITHOUT COMPLICATION, WITHOUT LONG-TERM CURRENT USE OF INSULIN: Primary | ICD-10-CM

## 2024-05-13 DIAGNOSIS — G47.33 OSA ON CPAP: ICD-10-CM

## 2024-05-13 DIAGNOSIS — D50.9 MICROCYTIC ANEMIA: ICD-10-CM

## 2024-05-13 PROCEDURE — 99214 OFFICE O/P EST MOD 30 MIN: CPT | Performed by: FAMILY MEDICINE

## 2024-05-13 RX ORDER — IPRATROPIUM BROMIDE 42 UG/1
2 SPRAY, METERED NASAL 3 TIMES DAILY
COMMUNITY
Start: 2024-05-01

## 2024-05-13 NOTE — PATIENT INSTRUCTIONS
Calorie Counting for Weight Loss  Calories are units of energy. Your body needs a certain number of calories from food to keep going throughout the day. When you eat or drink more calories than your body needs, your body stores the extra calories mostly as fat. When you eat or drink fewer calories than your body needs, your body burns fat to get the energy it needs.  Calorie counting means keeping track of how many calories you eat and drink each day. Calorie counting can be helpful if you need to lose weight. If you eat fewer calories than your body needs, you should lose weight. Ask your health care provider what a healthy weight is for you.  For calorie counting to work, you will need to eat the right number of calories each day to lose a healthy amount of weight per week. A dietitian can help you figure out how many calories you need in a day and will suggest ways to reach your calorie goal.  A healthy amount of weight to lose each week is usually 1-2 lb (0.5-0.9 kg). This usually means that your daily calorie intake should be reduced by 500-750 calories.  Eating 1,200-1,500 calories a day can help most women lose weight.  Eating 1,500-1,800 calories a day can help most men lose weight.  What do I need to know about calorie counting?  Work with your health care provider or dietitian to determine how many calories you should get each day. To meet your daily calorie goal, you will need to:  Find out how many calories are in each food that you would like to eat. Try to do this before you eat.  Decide how much of the food you plan to eat.  Keep a food log. Do this by writing down what you ate and how many calories it had.  To successfully lose weight, it is important to balance calorie counting with a healthy lifestyle that includes regular activity.  Where do I find calorie information?  The number of calories in a food can be found on a Nutrition Facts label. If a food does not have a Nutrition Facts label, try to  look up the calories online or ask your dietitian for help.  Remember that calories are listed per serving. If you choose to have more than one serving of a food, you will have to multiply the calories per serving by the number of servings you plan to eat. For example, the label on a package of bread might say that a serving size is 1 slice and that there are 90 calories in a serving. If you eat 1 slice, you will have eaten 90 calories. If you eat 2 slices, you will have eaten 180 calories.  How do I keep a food log?  After each time that you eat, record the following in your food log as soon as possible:  What you ate. Be sure to include toppings, sauces, and other extras on the food.  How much you ate. This can be measured in cups, ounces, or number of items.  How many calories were in each food and drink.  The total number of calories in the food you ate.  Keep your food log near you, such as in a pocket-sized notebook or on an martin or website on your mobile phone. Some programs will calculate calories for you and show you how many calories you have left to meet your daily goal.  What are some portion-control tips?  Know how many calories are in a serving. This will help you know how many servings you can have of a certain food.  Use a measuring cup to measure serving sizes. You could also try weighing out portions on a kitchen scale. With time, you will be able to estimate serving sizes for some foods.  Take time to put servings of different foods on your favorite plates or in your favorite bowls and cups so you know what a serving looks like.  Try not to eat straight from a food's packaging, such as from a bag or box. Eating straight from the package makes it hard to see how much you are eating and can lead to overeating. Put the amount you would like to eat in a cup or on a plate to make sure you are eating the right portion.  Use smaller plates, glasses, and bowls for smaller portions and to prevent  overeating.  Try not to multitask. For example, avoid watching TV or using your computer while eating. If it is time to eat, sit down at a table and enjoy your food. This will help you recognize when you are full. It will also help you be more mindful of what and how much you are eating.  What are tips for following this plan?  Reading food labels  Check the calorie count compared with the serving size. The serving size may be smaller than what you are used to eating.  Check the source of the calories. Try to choose foods that are high in protein, fiber, and vitamins, and low in saturated fat, trans fat, and sodium.  Shopping  Read nutrition labels while you shop. This will help you make healthy decisions about which foods to buy.  Pay attention to nutrition labels for low-fat or fat-free foods. These foods sometimes have the same number of calories or more calories than the full-fat versions. They also often have added sugar, starch, or salt to make up for flavor that was removed with the fat.  Make a grocery list of lower-calorie foods and stick to it.  Cooking  Try to cook your favorite foods in a healthier way. For example, try baking instead of frying.  Use low-fat dairy products.  Meal planning  Use more fruits and vegetables. One-half of your plate should be fruits and vegetables.  Include lean proteins, such as chicken, turkey, and fish.  Lifestyle  Each week, aim to do one of the followin minutes of moderate exercise, such as walking.  75 minutes of vigorous exercise, such as running.  General information  Know how many calories are in the foods you eat most often. This will help you calculate calorie counts faster.  Find a way of tracking calories that works for you. Get creative. Try different apps or programs if writing down calories does not work for you.  What foods should I eat?    Eat nutritious foods. It is better to have a nutritious, high-calorie food, such as an avocado, than a food with  few nutrients, such as a bag of potato chips.  Use your calories on foods and drinks that will fill you up and will not leave you hungry soon after eating.  Examples of foods that fill you up are nuts and nut butters, vegetables, lean proteins, and high-fiber foods such as whole grains. High-fiber foods are foods with more than 5 g of fiber per serving.  Pay attention to calories in drinks. Low-calorie drinks include water and unsweetened drinks.  The items listed above may not be a complete list of foods and beverages you can eat. Contact a dietitian for more information.  What foods should I limit?  Limit foods or drinks that are not good sources of vitamins, minerals, or protein or that are high in unhealthy fats. These include:  Candy.  Other sweets.  Sodas, specialty coffee drinks, alcohol, and juice.  The items listed above may not be a complete list of foods and beverages you should avoid. Contact a dietitian for more information.  How do I count calories when eating out?  Pay attention to portions. Often, portions are much larger when eating out. Try these tips to keep portions smaller:  Consider sharing a meal instead of getting your own.  If you get your own meal, eat only half of it. Before you start eating, ask for a container and put half of your meal into it.  When available, consider ordering smaller portions from the menu instead of full portions.  Pay attention to your food and drink choices. Knowing the way food is cooked and what is included with the meal can help you eat fewer calories.  If calories are listed on the menu, choose the lower-calorie options.  Choose dishes that include vegetables, fruits, whole grains, low-fat dairy products, and lean proteins.  Choose items that are boiled, broiled, grilled, or steamed. Avoid items that are buttered, battered, fried, or served with cream sauce. Items labeled as crispy are usually fried, unless stated otherwise.  Choose water, low-fat milk,  unsweetened iced tea, or other drinks without added sugar. If you want an alcoholic beverage, choose a lower-calorie option, such as a glass of wine or light beer.  Ask for dressings, sauces, and syrups on the side. These are usually high in calories, so you should limit the amount you eat.  If you want a salad, choose a garden salad and ask for grilled meats. Avoid extra toppings such as shankar, cheese, or fried items. Ask for the dressing on the side, or ask for olive oil and vinegar or lemon to use as dressing.  Estimate how many servings of a food you are given. Knowing serving sizes will help you be aware of how much food you are eating at restaurants.  Where to find more information  Centers for Disease Control and Prevention: www.cdc.gov  [place] Department of Agriculture: myplate.gov  Summary  Calorie counting means keeping track of how many calories you eat and drink each day. If you eat fewer calories than your body needs, you should lose weight.  A healthy amount of weight to lose per week is usually 1-2 lb (0.5-0.9 kg). This usually means reducing your daily calorie intake by 500-750 calories.  The number of calories in a food can be found on a Nutrition Facts label. If a food does not have a Nutrition Facts label, try to look up the calories online or ask your dietitian for help.  Use smaller plates, glasses, and bowls for smaller portions and to prevent overeating.  Use your calories on foods and drinks that will fill you up and not leave you hungry shortly after a meal.  This information is not intended to replace advice given to you by your health care provider. Make sure you discuss any questions you have with your health care provider.  Document Revised: 01/28/2021 Document Reviewed: 01/28/2021  Elsevier Patient Education © 2022 [employer]    Exercising to Lose Weight  Getting regular exercise is important for everyone. It is especially important if you are overweight. Being overweight increases  your risk of heart disease, stroke, diabetes, high blood pressure, and several types of cancer. Exercising, and reducing the calories you consume, can help you lose weight and improve fitness and health.  Exercise can be moderate or vigorous intensity. To lose weight, most people need to do a certain amount of moderate or vigorous-intensity exercise each week.  How can exercise affect me?  You lose weight when you exercise enough to burn more calories than you eat. Exercise also reduces body fat and builds muscle. The more muscle you have, the more calories you burn. Exercise also:  Improves mood.  Reduces stress and tension.  Improves your overall fitness, flexibility, and endurance.  Increases bone strength.  Moderate-intensity exercise  Moderate-intensity exercise is any activity that gets you moving enough to burn at least three times more energy (calories) than if you were sitting.  Examples of moderate exercise include:  Walking a mile in 15 minutes.  Doing light yard work.  Biking at an easy pace.  Most people should get at least 150 minutes of moderate-intensity exercise a week to maintain their body weight.  Vigorous-intensity exercise  Vigorous-intensity exercise is any activity that gets you moving enough to burn at least six times more calories than if you were sitting. When you exercise at this intensity, you should be working hard enough that you are not able to carry on a conversation.  Examples of vigorous exercise include:  Running.  Playing a team sport, such as football, basketball, and soccer.  Jumping rope.  Most people should get at least 75 minutes a week of vigorous exercise to maintain their body weight.  What actions can I take to lose weight?  The amount of exercise you need to lose weight depends on:  Your age.  The type of exercise.  Any health conditions you have.  Your overall physical ability.  Talk to your health care provider about how much exercise you need and what types of  activities are safe for you.  Nutrition    Make changes to your diet as told by your health care provider or diet and nutrition specialist (dietitian). This may include:  Eating fewer calories.  Eating more protein.  Eating less unhealthy fats.  Eating a diet that includes fresh fruits and vegetables, whole grains, low-fat dairy products, and lean protein.  Avoiding foods with added fat, salt, and sugar.  Drink plenty of water while you exercise to prevent dehydration or heat stroke.  Activity  Choose an activity that you enjoy and set realistic goals. Your health care provider can help you make an exercise plan that works for you.  Exercise at a moderate or vigorous intensity most days of the week.  The intensity of exercise may vary from person to person. You can tell how intense a workout is for you by paying attention to your breathing and heartbeat. Most people will notice their breathing and heartbeat get faster with more intense exercise.  Do resistance training twice each week, such as:  Push-ups.  Sit-ups.  Lifting weights.  Using resistance bands.  Getting short amounts of exercise can be just as helpful as long, structured periods of exercise. If you have trouble finding time to exercise, try doing these things as part of your daily routine:  Get up, stretch, and walk around every 30 minutes throughout the day.  Go for a walk during your lunch break.  Park your car farther away from your destination.  If you take public transportation, get off one stop early and walk the rest of the way.  Make phone calls while standing up and walking around.  Take the stairs instead of elevators or escalators.  Wear comfortable clothes and shoes with good support.  Do not exercise so much that you hurt yourself, feel dizzy, or get very short of breath.  Where to find more information  [place] Department of Health and Human Services: www.hhs.gov  Centers for Disease Control and Prevention: www.cdc.gov  Contact a health care  provider:  Before starting a new exercise program.  If you have questions or concerns about your weight.  If you have a medical problem that keeps you from exercising.  Get help right away if:  You have any of the following while exercising:  Injury.  Dizziness.  Difficulty breathing or shortness of breath that does not go away when you stop exercising.  Chest pain.  Rapid heartbeat.  These symptoms may represent a serious problem that is an emergency. Do not wait to see if the symptoms will go away. Get medical help right away. Call your local emergency services (911 in the [place] Do not drive yourself to the hospital.  Summary  Getting regular exercise is especially important if you are overweight.  Being overweight increases your risk of heart disease, stroke, diabetes, high blood pressure, and several types of cancer.  Losing weight happens when you burn more calories than you eat.  Reducing the amount of calories you eat, and getting regular moderate or vigorous exercise each week, helps you lose weight.  This information is not intended to replace advice given to you by your health care provider. Make sure you discuss any questions you have with your health care provider.  Document Revised: 02/13/2022 Document Reviewed: 02/13/2022  Elsevier Patient Education © 2022 [employer]

## 2024-05-13 NOTE — PROGRESS NOTES
Subjective   Miles Munoz is a 58 y.o. male. Presents today for   Chief Complaint   Patient presents with    Weight Check    Hyperlipidemia       History of Present Illness  Patient 57 y/o with dm2, hld, amena on cpap on ozempic, weight plateaued.   Reports had thrush after abx, but not resolving and stopped topamax then resolved.  Still not smoking;  Has odor stil, seeing ENT for this.  Would like go up on ozempic.        Review of Systems   Respiratory:  Negative for shortness of breath.    Cardiovascular:  Negative for chest pain and palpitations.   Gastrointestinal:  Negative for abdominal pain.   Allergic/Immunologic: Positive for environmental allergies.       Patient Active Problem List   Diagnosis    Occult blood positive stool    Gastroesophageal reflux disease    Tubulovillous adenoma of colon    Multiple gastric ulcers    Snores    Witnessed apneic spells    Obesity, morbid, BMI 40.0-49.9    Sleep-related bruxism    AMENA on CPAP    Hypersomnia with sleep apnea    Sleep related hypoxia    Hyperlipidemia    Palpitations    Personal history of colonic polyps    Calcaneal spur    Congenital pes cavus    Pain in limb    Plantar fascial fibromatosis    Prediabetes    Dyspepsia    Abdominal discomfort       Social History     Socioeconomic History    Marital status:    Tobacco Use    Smoking status: Former     Current packs/day: 0.00     Average packs/day: 1 pack/day for 46.2 years (46.2 ttl pk-yrs)     Types: Cigarettes     Start date: 10/5/1976     Quit date: 2022     Years since quittin.3    Smokeless tobacco: Never    Tobacco comments:     close to being ready   Vaping Use    Vaping status: Never Used   Substance and Sexual Activity    Alcohol use: Yes     Alcohol/week: 1.0 standard drink of alcohol     Types: 1 Drinks containing 0.5 oz of alcohol per week     Comment: rarely    Drug use: No    Sexual activity: Defer       Allergies   Allergen Reactions    Prednisone Other (See Comments)      Causes muscles to constrict        Current Outpatient Medications on File Prior to Visit   Medication Sig Dispense Refill    atorvastatin (LIPITOR) 20 MG tablet Take 1 tablet by mouth once daily 90 tablet 0    Azelastine-Fluticasone 137-50 MCG/ACT suspension 1 spray into the nostril(s) as directed by provider 2 (Two) Times a Day. 23 g 12    cetirizine (zyrTEC) 10 MG tablet Take 1 tablet by mouth Every Morning.      ferrous gluconate (FERGON) 324 MG tablet TAKE 1 TABLET BY MOUTH ONCE DAILY WITH BREAKFAST FOR 90 DAYS 90 tablet 0    ipratropium (ATROVENT) 0.06 % nasal spray 2 sprays into the nostril(s) as directed by provider 3 (Three) Times a Day.      meloxicam (MOBIC) 15 MG tablet Take 1 tablet by mouth once daily 90 tablet 0    omeprazole (priLOSEC) 40 MG capsule Take 1 capsule by mouth Daily. 90 capsule 1    sildenafil (VIAGRA) 100 MG tablet Take 1 tablet by mouth Daily As Needed for Erectile Dysfunction. Instructed to withhold med 48 hrs prior to sx 30 tablet 5    tamsulosin (FLOMAX) 0.4 MG capsule 24 hr capsule Take 1 capsule by mouth once daily 30 capsule 11    [DISCONTINUED] Semaglutide, 1 MG/DOSE, (Ozempic, 1 MG/DOSE,) 4 MG/3ML solution pen-injector Inject 1 mg under the skin into the appropriate area as directed 1 (One) Time Per Week. 3 mL 1    triazolam (HALCION) 0.25 MG tablet TAKE 1 TABLET BY MOUTH AT NIGHT THEN ONE TABLET IN THE OFFICE DAY OF PROCEDURE AND KEEP ADDITIONAL IF NECESSARY FOR APPOINTMENT (Patient not taking: Reported on 5/13/2024)      [DISCONTINUED] amoxicillin-clavulanate (AUGMENTIN) 875-125 MG per tablet Take 1 tablet by mouth 2 (Two) Times a Day. (Patient not taking: Reported on 5/13/2024) 20 tablet 0    [DISCONTINUED] HYDROcodone-acetaminophen (NORCO) 7.5-325 MG per tablet       [DISCONTINUED] ondansetron ODT (ZOFRAN-ODT) 4 MG disintegrating tablet Place 1 tablet on the tongue Every 8 (Eight) Hours As Needed for Nausea or Vomiting. (Patient not taking: Reported on 5/13/2024) 90 tablet 2  "   [DISCONTINUED] phentermine (ADIPEX-P) 37.5 MG tablet Take 1 tablet by mouth Every Morning Before Breakfast. (Patient not taking: Reported on 5/13/2024) 30 tablet 2    [DISCONTINUED] tobramycin-dexAMETHasone (TOBRADEX) 0.3-0.1 % ophthalmic suspension Administer 1 drop to both eyes Every 2 (Two) Hours While Awake. (Patient not taking: Reported on 5/13/2024)      [DISCONTINUED] topiramate (TOPAMAX) 25 MG tablet Take 1 tablet by mouth 2 (Two) Times a Day. (Patient not taking: Reported on 5/13/2024) 60 tablet 5     No current facility-administered medications on file prior to visit.       Objective   Vitals:    05/13/24 1439   BP: 132/80   Pulse: 61   SpO2: 99%   Weight: 122 kg (270 lb)   Height: 172.7 cm (68\")     Body mass index is 41.05 kg/m².    Physical Exam  Vitals and nursing note reviewed.   Constitutional:       Appearance: He is well-developed.   HENT:      Head: Normocephalic and atraumatic.   Neck:      Thyroid: No thyromegaly.      Vascular: No JVD.   Cardiovascular:      Rate and Rhythm: Normal rate and regular rhythm.      Heart sounds: Normal heart sounds. No murmur heard.     No friction rub. No gallop.   Pulmonary:      Effort: Pulmonary effort is normal. No respiratory distress.      Breath sounds: Normal breath sounds. No wheezing or rales.   Abdominal:      General: Bowel sounds are normal. There is no distension.      Palpations: Abdomen is soft.      Tenderness: There is no abdominal tenderness. There is no guarding or rebound.   Musculoskeletal:      Cervical back: Neck supple.   Skin:     General: Skin is warm and dry.   Neurological:      Mental Status: He is alert.   Psychiatric:         Behavior: Behavior normal.         Assessment & Plan   Diagnoses and all orders for this visit:    1. Type 2 diabetes mellitus without complication, without long-term current use of insulin (Primary)  -     Hemoglobin A1c  -     Semaglutide, 2 MG/DOSE, (OZEMPIC) 8 MG/3ML solution pen-injector; Inject 2 mg " under the skin into the appropriate area as directed 1 (One) Time Per Week.  Dispense: 3 mL; Refill: 12    2. Mixed hyperlipidemia  -     Comprehensive Metabolic Panel  -     Lipid Panel    3. MARIA A on CPAP    4. Morbid obesity    5. Microcytic anemia  -     CBC & Differential  -     TSH  -     Folate  -     Ferritin  -     KALLIE,PE and FLC, Serum  -     Vitamin B12  -     Iron Profile    6. Other fatigue  -     Comprehensive Metabolic Panel  -     CBC & Differential  -     TSH  -     Vitamin B12    7. Morbid (severe) obesity due to excess calories  -     Semaglutide, 2 MG/DOSE, (OZEMPIC) 8 MG/3ML solution pen-injector; Inject 2 mg under the skin into the appropriate area as directed 1 (One) Time Per Week.  Dispense: 3 mL; Refill: 12    Ok go up on ozempic;  work on weight loss  Last check had microcytic anemia, due labs;  Taking OTC iron per patient.  -MARIA A - compliant with cpap and medically benefit to continue.  -HLD - continue statin, recheck lipids.  Pending allergy testing with ENT                   -Follow up: 6 months and prn

## 2024-05-14 LAB
ALBUMIN SERPL ELPH-MCNC: 3.4 G/DL (ref 2.9–4.4)
ALBUMIN SERPL-MCNC: 4 G/DL (ref 3.8–4.9)
ALBUMIN/GLOB SERPL: 1.2 {RATIO} (ref 0.7–1.7)
ALBUMIN/GLOB SERPL: 1.7 {RATIO} (ref 1.2–2.2)
ALP SERPL-CCNC: 79 IU/L (ref 44–121)
ALPHA1 GLOB SERPL ELPH-MCNC: 0.2 G/DL (ref 0–0.4)
ALPHA2 GLOB SERPL ELPH-MCNC: 0.9 G/DL (ref 0.4–1)
ALT SERPL-CCNC: 22 IU/L (ref 0–44)
AST SERPL-CCNC: 12 IU/L (ref 0–40)
B-GLOBULIN SERPL ELPH-MCNC: 0.9 G/DL (ref 0.7–1.3)
BASOPHILS # BLD AUTO: 0.1 X10E3/UL (ref 0–0.2)
BASOPHILS NFR BLD AUTO: 1 %
BILIRUB SERPL-MCNC: 0.4 MG/DL (ref 0–1.2)
BUN SERPL-MCNC: 12 MG/DL (ref 6–24)
BUN/CREAT SERPL: 12 (ref 9–20)
CALCIUM SERPL-MCNC: 8.9 MG/DL (ref 8.7–10.2)
CHLORIDE SERPL-SCNC: 107 MMOL/L (ref 96–106)
CHOLEST SERPL-MCNC: 131 MG/DL (ref 100–199)
CO2 SERPL-SCNC: 23 MMOL/L (ref 20–29)
CREAT SERPL-MCNC: 1.01 MG/DL (ref 0.76–1.27)
EGFRCR SERPLBLD CKD-EPI 2021: 86 ML/MIN/1.73
EOSINOPHIL # BLD AUTO: 0.4 X10E3/UL (ref 0–0.4)
EOSINOPHIL NFR BLD AUTO: 7 %
ERYTHROCYTE [DISTWIDTH] IN BLOOD BY AUTOMATED COUNT: 13.1 % (ref 11.6–15.4)
FERRITIN SERPL-MCNC: 23 NG/ML (ref 30–400)
FOLATE SERPL-MCNC: >20 NG/ML
GAMMA GLOB SERPL ELPH-MCNC: 1 G/DL (ref 0.4–1.8)
GLOBULIN SER CALC-MCNC: 2.4 G/DL (ref 1.5–4.5)
GLOBULIN SER-MCNC: 3 G/DL (ref 2.2–3.9)
GLUCOSE SERPL-MCNC: 103 MG/DL (ref 70–99)
HBA1C MFR BLD: 5.7 % (ref 4.8–5.6)
HCT VFR BLD AUTO: 41.4 % (ref 37.5–51)
HDLC SERPL-MCNC: 44 MG/DL
HGB BLD-MCNC: 13.4 G/DL (ref 13–17.7)
IGA SERPL-MCNC: 204 MG/DL (ref 90–386)
IGG SERPL-MCNC: 1051 MG/DL (ref 603–1613)
IGM SERPL-MCNC: 32 MG/DL (ref 20–172)
IMM GRANULOCYTES # BLD AUTO: 0 X10E3/UL (ref 0–0.1)
IMM GRANULOCYTES NFR BLD AUTO: 0 %
INTERPRETATION SERPL IEP-IMP: NORMAL
IRON SATN MFR SERPL: 17 % (ref 15–55)
IRON SERPL-MCNC: 54 UG/DL (ref 38–169)
KAPPA LC FREE SER-MCNC: 19.2 MG/L (ref 3.3–19.4)
KAPPA LC FREE/LAMBDA FREE SER: 1.3 {RATIO} (ref 0.26–1.65)
LABORATORY COMMENT REPORT: NORMAL
LAMBDA LC FREE SERPL-MCNC: 14.8 MG/L (ref 5.7–26.3)
LDLC SERPL CALC-MCNC: 65 MG/DL (ref 0–99)
LYMPHOCYTES # BLD AUTO: 1.9 X10E3/UL (ref 0.7–3.1)
LYMPHOCYTES NFR BLD AUTO: 32 %
M PROTEIN SERPL ELPH-MCNC: NORMAL G/DL
MCH RBC QN AUTO: 27.7 PG (ref 26.6–33)
MCHC RBC AUTO-ENTMCNC: 32.4 G/DL (ref 31.5–35.7)
MCV RBC AUTO: 86 FL (ref 79–97)
MONOCYTES # BLD AUTO: 0.4 X10E3/UL (ref 0.1–0.9)
MONOCYTES NFR BLD AUTO: 7 %
NEUTROPHILS # BLD AUTO: 3.2 X10E3/UL (ref 1.4–7)
NEUTROPHILS NFR BLD AUTO: 53 %
PLATELET # BLD AUTO: 182 X10E3/UL (ref 150–450)
POTASSIUM SERPL-SCNC: 3.8 MMOL/L (ref 3.5–5.2)
PROT SERPL-MCNC: 6.4 G/DL (ref 6–8.5)
RBC # BLD AUTO: 4.83 X10E6/UL (ref 4.14–5.8)
SODIUM SERPL-SCNC: 144 MMOL/L (ref 134–144)
TIBC SERPL-MCNC: 326 UG/DL (ref 250–450)
TRIGL SERPL-MCNC: 122 MG/DL (ref 0–149)
TSH SERPL DL<=0.005 MIU/L-ACNC: 0.88 UIU/ML (ref 0.45–4.5)
UIBC SERPL-MCNC: 272 UG/DL (ref 111–343)
VIT B12 SERPL-MCNC: 875 PG/ML (ref 232–1245)
VLDLC SERPL CALC-MCNC: 22 MG/DL (ref 5–40)
WBC # BLD AUTO: 6 X10E3/UL (ref 3.4–10.8)

## 2024-05-19 NOTE — PROGRESS NOTES
Call results to patient.  Blood counts normalized, ok stop ferrous gluconate  A1C prediabetes range, work on diet  Cholesterol well controlled  Kidney and liver function normal

## 2024-07-29 ENCOUNTER — OFFICE VISIT (OUTPATIENT)
Dept: SLEEP MEDICINE | Facility: HOSPITAL | Age: 59
End: 2024-07-29
Payer: COMMERCIAL

## 2024-07-29 ENCOUNTER — TELEPHONE (OUTPATIENT)
Dept: SLEEP MEDICINE | Facility: HOSPITAL | Age: 59
End: 2024-07-29
Payer: COMMERCIAL

## 2024-07-29 VITALS
WEIGHT: 265 LBS | HEIGHT: 67 IN | DIASTOLIC BLOOD PRESSURE: 84 MMHG | OXYGEN SATURATION: 98 % | SYSTOLIC BLOOD PRESSURE: 151 MMHG | HEART RATE: 58 BPM | BODY MASS INDEX: 41.59 KG/M2

## 2024-07-29 DIAGNOSIS — E66.01 MORBID OBESITY WITH BODY MASS INDEX OF 40.0-49.9: ICD-10-CM

## 2024-07-29 DIAGNOSIS — G47.33 OSA (OBSTRUCTIVE SLEEP APNEA): Primary | ICD-10-CM

## 2024-07-29 PROCEDURE — G0463 HOSPITAL OUTPT CLINIC VISIT: HCPCS

## 2024-07-29 NOTE — PROGRESS NOTES
[facility]  Telephone: [phone] / Fax: [phone] [place]  Telephone: [phone] / Fax: [phone] Yocasta Harris    PCP: Aly Espinosa DO    Reason for visit: MARIA A f/u    Miles Munoz is a 58 y.o.male  was last seen at [facility] sleep lab 1 year ago. He is doing great on the machine, finds the pressure comfortable, F20 mask fits well, there are occasional leaks. He finds the ramp pressure too low.  Device pressure is set at 17-20cm H2O. He started allergy injections and feels that nose is stuffed up. Allergies are getting worse. He is allergic to dust, mold, and grass. Sleep schedule is 9:30pm-4:30am, ESS Is 7    SH- no tobacco, no alcohol, no caffeine.    ROS- + nasal congestion, rest is negative.    Current Medications:    Current Outpatient Medications:     atorvastatin (LIPITOR) 20 MG tablet, Take 1 tablet by mouth once daily, Disp: 90 tablet, Rfl: 0    Azelastine-Fluticasone 137-50 MCG/ACT suspension, 1 spray into the nostril(s) as directed by provider 2 (Two) Times a Day., Disp: 23 g, Rfl: 12    cetirizine (zyrTEC) 10 MG tablet, Take 1 tablet by mouth Every Morning., Disp: , Rfl:     ferrous gluconate (FERGON) 324 MG tablet, TAKE 1 TABLET BY MOUTH ONCE DAILY WITH BREAKFAST FOR 90 DAYS, Disp: 90 tablet, Rfl: 0    ipratropium (ATROVENT) 0.06 % nasal spray, 2 sprays into the nostril(s) as directed by provider 3 (Three) Times a Day., Disp: , Rfl:     meloxicam (MOBIC) 15 MG tablet, Take 1 tablet by mouth once daily, Disp: 90 tablet, Rfl: 0    omeprazole (priLOSEC) 40 MG capsule, Take 1 capsule by mouth Daily., Disp: 90 capsule, Rfl: 1    Semaglutide, 2 MG/DOSE, (OZEMPIC) 8 MG/3ML solution pen-injector, Inject 2 mg under the skin into the appropriate area as directed 1 (One) Time Per Week., Disp: 3 mL, Rfl: 12    sildenafil (VIAGRA) 100 MG tablet, Take 1 tablet by mouth Daily As Needed for Erectile Dysfunction. Instructed to withhold med 48 hrs prior to sx, Disp: 30 tablet, Rfl:  "5    tamsulosin (FLOMAX) 0.4 MG capsule 24 hr capsule, Take 1 capsule by mouth once daily, Disp: 30 capsule, Rfl: 11    triazolam (HALCION) 0.25 MG tablet, TAKE 1 TABLET BY MOUTH AT NIGHT THEN ONE TABLET IN THE OFFICE DAY OF PROCEDURE AND KEEP ADDITIONAL IF NECESSARY FOR APPOINTMENT (Patient not taking: Reported on 5/13/2024), Disp: , Rfl:    also entered in Sleep Questionnaire    Patient  has a past medical history of Allergic rhinitis, Arthritis, Back pain, GERD (gastroesophageal reflux disease), Hyperlipidemia, Renal stone, Seasonal allergies, and Sleep apnea.    I have reviewed the Past Medical History, Past Surgical History, Social History and Family History.    Vital Signs /84   Pulse 58   Ht 170.2 cm (67.01\")   Wt 120 kg (265 lb)   SpO2 98%   BMI 41.49 kg/m²  Body mass index is 41.49 kg/m².    General Alert and oriented. No acute distress noted   Pharynx/Throat Class IV  Mallampati airway, large tongue, no evidence of redundant lateral pharyngeal tissue. No oral lesions. No thrush. Moist mucous membranes.   Head Normocephalic. Symmetrical. Atraumatic.    Nose No septal deviation. No drainage   Chest Wall Normal shape. Symmetric expansion with respiration. No tenderness.   Neck Trachea midline, no thyromegaly or adenopathy    Lungs Clear to auscultation bilaterally. No wheezes. No rhonchi. No rales. Respirations regular, even and unlabored.   Heart Regular rhythm and normal rate. Normal S1 and S2. No murmur   Abdomen Soft, non-tender and non-distended. Normal bowel sounds. No masses.   Extremities Moves all extremities well. No edema   Psychiatric Normal mood and affect.     Testing:  Download 4/27/24-7/25/24- 98% use with average nightly use of 7 hours and 29 minutes on auto CPAP 17-20cm H2O, avg pr 17cm H2O, AHI 0.3/hr.    Study-Diagnostic data available to date is as below and was reviewed on current visit:  HST 3/13/2018:   This is a home sleep study done on 3/13/18, total recording time was " 423.5 minutes with total monitoring time of 415.0 minutes. Respiratory summary: Severe MARIA A with AHI of 30.7 with positional component pain worst in the left position with AHI of 68.7.  Oxygenation: Significant hypoxemia was bozena desaturation to 75% with 41.5 minutes spent in the hypoxemic range.  Patient had clusters of significant hypoxemia that are likely to be REM sleep related however no way to confirm without the EEG recording  Snoring: Patient had severe snoring at 75.9% of total recording time  Recommendations: auto CPAP 8-20 cmH20 and repeat overnight oximetry on CPAP.   Overnight oximetry on CPAP  room air done on 6/14/18  Patient had oxygenation in the lower 90s but still within normal range most of the night with only 0.6 minutes with sats below or equal to 88%  There is rare scattered desaturation with overall acceptable profile with no significant hypoxemia or clustering of desaturations    Impression:  1. MARIA A (obstructive sleep apnea)    2. Morbid obesity with body mass index of 40.0-49.9          Plan:  Turn the ramp feature off. Continue auto CPAP 17-20cm H2O. Call if pressures feels excessive or insufficient.    He is doing low dose screening CT with PCP and reportedly there are no pulmonary abnormalities reported.    History of significant tobacco use. Quit smoking few years ago and has no plans to restart.     I reviewed download report and original sleep study report with the patient. I advised pt to contact us if PAP pressures feel excessive or insufficient.    Weight loss will be strongly beneficial to reduce the severity of sleep-disordered breathing.  Caution during activities that require prolonged concentration is strongly advised if sleepiness returns. Changing of PAP supplies regularly is important for effective use.  Nasal pillows should be changed twice per month, tubing every 3 months, disposable filters twice per  month, water chamber every 6 months. Nasal mask frame should be  changed every 3 months, nasal mask cushion twice per month and headgear every 6 months. Full face mask frame should be replaced every 3 months, full face cushion monthly and headgear every 6 months..    Follow up with Dr. Knight in one year    Thank you for allowing me to participate in your patient's care.      ALIA Garcia  [facility]  Phone: [phone]      Part of this note may be an electronic transcription/translation of spoken language to printed text using the Dragon Dictation System.

## 2024-08-28 DIAGNOSIS — M15.9 PRIMARY OSTEOARTHRITIS INVOLVING MULTIPLE JOINTS: ICD-10-CM

## 2024-08-28 DIAGNOSIS — G89.29 CHRONIC BILATERAL LOW BACK PAIN WITHOUT SCIATICA: ICD-10-CM

## 2024-08-28 DIAGNOSIS — M54.50 CHRONIC BILATERAL LOW BACK PAIN WITHOUT SCIATICA: ICD-10-CM

## 2024-08-28 RX ORDER — MELOXICAM 15 MG/1
15 TABLET ORAL DAILY
Qty: 90 TABLET | Refills: 0 | Status: SHIPPED | OUTPATIENT
Start: 2024-08-28

## 2024-08-29 ENCOUNTER — OFFICE VISIT (OUTPATIENT)
Dept: FAMILY MEDICINE CLINIC | Facility: CLINIC | Age: 59
End: 2024-08-29
Payer: COMMERCIAL

## 2024-08-29 VITALS
HEIGHT: 67 IN | DIASTOLIC BLOOD PRESSURE: 74 MMHG | BODY MASS INDEX: 41.4 KG/M2 | WEIGHT: 263.8 LBS | OXYGEN SATURATION: 98 % | SYSTOLIC BLOOD PRESSURE: 142 MMHG | HEART RATE: 68 BPM

## 2024-08-29 DIAGNOSIS — L03.012 PARONYCHIA OF THUMB, LEFT: Primary | ICD-10-CM

## 2024-08-29 PROCEDURE — 99212 OFFICE O/P EST SF 10 MIN: CPT

## 2024-08-29 RX ORDER — MUPIROCIN 20 MG/G
1 OINTMENT TOPICAL 3 TIMES DAILY PRN
Qty: 30 G | Refills: 0 | Status: SHIPPED | OUTPATIENT
Start: 2024-08-29

## 2024-08-29 NOTE — PROGRESS NOTES
"Subjective   Miles Munoz is a 58 y.o. male.     Chief Complaint   Patient presents with    Hand Pain     Left thumb irritation x 3-4 days     History of Present Illness     Paronychia of left thumb: Patient states that for the last 3-4 days, the skin toward the base of his fingernail has become red, swollen, and tender. He has been soaking in epsom salt/water. He does work with his hands, and this has been very painful for him. He is right-handed. No known cut/hangnail that became infected. Patient doesn't recall this happening to him before. States that he attempted to \"drain it,\" but no fluid came out. He denies any visualization of pus/blood around the site.     The following portions of the patient's history were reviewed and updated as appropriate: allergies, current medications, past family history, past medical history, past social history, past surgical history and problem list.    Review of Systems   Denies dizziness, fatigue, fever/chills, CP, SOA, headache, blood in urine/stool, abd pain, leg swelling.    Objective     Vitals:    08/29/24 1458   BP: 142/74   Pulse: 68   SpO2: 98%      Body mass index is 41.3 kg/m².    Physical Exam  Constitutional:       General: He is not in acute distress.     Appearance: Normal appearance. He is obese. He is not ill-appearing or toxic-appearing.   HENT:      Right Ear: External ear normal.      Left Ear: External ear normal.      Nose: No congestion or rhinorrhea.      Mouth/Throat:      Mouth: Mucous membranes are moist.      Pharynx: Oropharynx is clear.   Eyes:      Conjunctiva/sclera: Conjunctivae normal.   Cardiovascular:      Rate and Rhythm: Normal rate.      Pulses: Normal pulses.      Heart sounds: Normal heart sounds.   Pulmonary:      Effort: Pulmonary effort is normal. No respiratory distress.      Breath sounds: Normal breath sounds.   Abdominal:      General: Bowel sounds are normal.      Palpations: Abdomen is soft.      Tenderness: There is no " abdominal tenderness.   Skin:     General: Skin is warm and dry.      Capillary Refill: Capillary refill takes less than 2 seconds.      Findings: Erythema (see image of left thumb) present.   Neurological:      General: No focal deficit present.      Mental Status: He is alert and oriented to person, place, and time.      Motor: No weakness.   Psychiatric:         Behavior: Behavior normal.           Assessment & Plan   Diagnoses and all orders for this visit:    1. Paronychia of thumb, left (Primary)  -     mupirocin (BACTROBAN) 2 % ointment; Apply 1 Application topically to the appropriate area as directed 3 (Three) Times a Day As Needed (as needed up to 3 times/day).  Dispense: 30 g; Refill: 0      Plan:     Low suspicion for infection. Advised to continue epsom salt soaks 2-3x/day for 10-15 minutes, along with applying ATB ointment and covering for prevention of infection. Ok to take tylenol prn for pain.  Avoid cutting nails too short.  Follow up if symptoms worsen or fail to improve    Discussed Care Gaps, ordered referrals and encouraged vaccination updates.       - Pt agrees with plan of care and denies further questions/concerns today  - This document is intended for medical expert use only. Persons  reading this document without medical staff guidance may result in misinterpretation and unintended morbidity     Go to the ER for any possible life-threatening symptoms such as chest pain or shortness of air.      Please allow 3-5 business days for recommendations based on new results      I personally spent time with this patient, preparing for the visit, reviewing tests, obtaining and/or reviewing a separately obtained history, performing a medically appropriate examination and/or evaluation, counseling and educating the patient/family/caregiver, ordering medications,  documenting information in the medical record and indepentently interpreting results.

## 2024-08-30 NOTE — PATIENT INSTRUCTIONS

## 2024-09-04 DIAGNOSIS — L03.012 PARONYCHIA OF THUMB, LEFT: Primary | ICD-10-CM

## 2024-09-04 RX ORDER — DOXYCYCLINE 100 MG/1
100 CAPSULE ORAL 2 TIMES DAILY
Qty: 14 CAPSULE | Refills: 0 | Status: SHIPPED | OUTPATIENT
Start: 2024-09-04 | End: 2024-09-11

## 2024-10-15 DIAGNOSIS — G89.29 CHRONIC BILATERAL LOW BACK PAIN WITHOUT SCIATICA: ICD-10-CM

## 2024-10-15 DIAGNOSIS — M54.50 CHRONIC BILATERAL LOW BACK PAIN WITHOUT SCIATICA: ICD-10-CM

## 2024-10-15 DIAGNOSIS — M15.0 PRIMARY OSTEOARTHRITIS INVOLVING MULTIPLE JOINTS: ICD-10-CM

## 2024-10-16 RX ORDER — MELOXICAM 15 MG/1
15 TABLET ORAL DAILY
Qty: 90 TABLET | Refills: 0 | OUTPATIENT
Start: 2024-10-16

## 2024-10-23 DIAGNOSIS — E78.49 HYPERLIPIDEMIA DUE TO DIETARY FAT INTAKE: ICD-10-CM

## 2024-10-23 RX ORDER — ATORVASTATIN CALCIUM 20 MG/1
20 TABLET, FILM COATED ORAL DAILY
Qty: 90 TABLET | Refills: 3 | Status: SHIPPED | OUTPATIENT
Start: 2024-10-23

## 2024-11-19 ENCOUNTER — OFFICE VISIT (OUTPATIENT)
Dept: FAMILY MEDICINE CLINIC | Facility: CLINIC | Age: 59
End: 2024-11-19
Payer: COMMERCIAL

## 2024-11-19 VITALS
OXYGEN SATURATION: 98 % | SYSTOLIC BLOOD PRESSURE: 118 MMHG | DIASTOLIC BLOOD PRESSURE: 72 MMHG | BODY MASS INDEX: 40.81 KG/M2 | HEART RATE: 62 BPM | HEIGHT: 67 IN | WEIGHT: 260 LBS

## 2024-11-19 DIAGNOSIS — G47.33 OSA ON CPAP: ICD-10-CM

## 2024-11-19 DIAGNOSIS — Z12.2 ENCOUNTER FOR SCREENING FOR LUNG CANCER: ICD-10-CM

## 2024-11-19 DIAGNOSIS — Z87.891 PERSONAL HISTORY OF TOBACCO USE, PRESENTING HAZARDS TO HEALTH: ICD-10-CM

## 2024-11-19 DIAGNOSIS — E11.9 TYPE 2 DIABETES MELLITUS WITHOUT COMPLICATION, WITHOUT LONG-TERM CURRENT USE OF INSULIN: Primary | ICD-10-CM

## 2024-11-19 DIAGNOSIS — M54.50 CHRONIC BILATERAL LOW BACK PAIN WITHOUT SCIATICA: ICD-10-CM

## 2024-11-19 DIAGNOSIS — E78.2 MIXED HYPERLIPIDEMIA: ICD-10-CM

## 2024-11-19 DIAGNOSIS — E66.01 MORBID (SEVERE) OBESITY DUE TO EXCESS CALORIES: ICD-10-CM

## 2024-11-19 DIAGNOSIS — M15.0 PRIMARY OSTEOARTHRITIS INVOLVING MULTIPLE JOINTS: ICD-10-CM

## 2024-11-19 DIAGNOSIS — Z79.899 DRUG THERAPY: ICD-10-CM

## 2024-11-19 DIAGNOSIS — G89.29 CHRONIC BILATERAL LOW BACK PAIN WITHOUT SCIATICA: ICD-10-CM

## 2024-11-19 DIAGNOSIS — E78.49 HYPERLIPIDEMIA DUE TO DIETARY FAT INTAKE: ICD-10-CM

## 2024-11-19 LAB
ALBUMIN SERPL-MCNC: 3.7 G/DL (ref 3.5–5.2)
ALBUMIN/GLOB SERPL: 1.4 G/DL
ALP SERPL-CCNC: 79 U/L (ref 39–117)
ALT SERPL-CCNC: 28 U/L (ref 1–41)
AST SERPL-CCNC: 22 U/L (ref 1–40)
BILIRUB SERPL-MCNC: 0.5 MG/DL (ref 0–1.2)
BUN SERPL-MCNC: 14 MG/DL (ref 6–20)
BUN/CREAT SERPL: 14.4 (ref 7–25)
CALCIUM SERPL-MCNC: 8.9 MG/DL (ref 8.6–10.5)
CHLORIDE SERPL-SCNC: 107 MMOL/L (ref 98–107)
CHOLEST SERPL-MCNC: 122 MG/DL (ref 0–200)
CO2 SERPL-SCNC: 28.7 MMOL/L (ref 22–29)
CREAT SERPL-MCNC: 0.97 MG/DL (ref 0.76–1.27)
EGFRCR SERPLBLD CKD-EPI 2021: 89.9 ML/MIN/1.73
ERYTHROCYTE [DISTWIDTH] IN BLOOD BY AUTOMATED COUNT: 12.5 % (ref 12.3–15.4)
GLOBULIN SER CALC-MCNC: 2.6 GM/DL
GLUCOSE SERPL-MCNC: 94 MG/DL (ref 65–99)
HBA1C MFR BLD: 5.3 % (ref 4.8–5.6)
HCT VFR BLD AUTO: 43.9 % (ref 37.5–51)
HDLC SERPL-MCNC: 41 MG/DL (ref 40–60)
HGB BLD-MCNC: 14.5 G/DL (ref 13–17.7)
LDLC SERPL CALC-MCNC: 65 MG/DL (ref 0–100)
MCH RBC QN AUTO: 29.2 PG (ref 26.6–33)
MCHC RBC AUTO-ENTMCNC: 33 G/DL (ref 31.5–35.7)
MCV RBC AUTO: 88.3 FL (ref 79–97)
PLATELET # BLD AUTO: 215 10*3/MM3 (ref 140–450)
POTASSIUM SERPL-SCNC: 4.3 MMOL/L (ref 3.5–5.2)
PROT SERPL-MCNC: 6.3 G/DL (ref 6–8.5)
RBC # BLD AUTO: 4.97 10*6/MM3 (ref 4.14–5.8)
SODIUM SERPL-SCNC: 141 MMOL/L (ref 136–145)
TRIGL SERPL-MCNC: 77 MG/DL (ref 0–150)
UNABLE TO VOID: NORMAL
VLDLC SERPL CALC-MCNC: 16 MG/DL (ref 5–40)
WBC # BLD AUTO: 6.54 10*3/MM3 (ref 3.4–10.8)

## 2024-11-19 PROCEDURE — 99214 OFFICE O/P EST MOD 30 MIN: CPT | Performed by: FAMILY MEDICINE

## 2024-11-19 RX ORDER — TAMSULOSIN HYDROCHLORIDE 0.4 MG/1
1 CAPSULE ORAL DAILY
Qty: 90 CAPSULE | Refills: 3 | Status: SHIPPED | OUTPATIENT
Start: 2024-11-19

## 2024-11-19 RX ORDER — MELOXICAM 15 MG/1
15 TABLET ORAL DAILY
Qty: 90 TABLET | Refills: 3 | Status: SHIPPED | OUTPATIENT
Start: 2024-11-19

## 2024-11-19 RX ORDER — ATORVASTATIN CALCIUM 20 MG/1
20 TABLET, FILM COATED ORAL DAILY
Qty: 90 TABLET | Refills: 3 | Status: SHIPPED | OUTPATIENT
Start: 2024-11-19

## 2024-11-19 NOTE — PATIENT INSTRUCTIONS
Calorie Counting for Weight Loss  Calories are units of energy. Your body needs a certain number of calories from food to keep going throughout the day. When you eat or drink more calories than your body needs, your body stores the extra calories mostly as fat. When you eat or drink fewer calories than your body needs, your body burns fat to get the energy it needs.  Calorie counting means keeping track of how many calories you eat and drink each day. Calorie counting can be helpful if you need to lose weight. If you eat fewer calories than your body needs, you should lose weight. Ask your health care provider what a healthy weight is for you.  For calorie counting to work, you will need to eat the right number of calories each day to lose a healthy amount of weight per week. A dietitian can help you figure out how many calories you need in a day and will suggest ways to reach your calorie goal.  A healthy amount of weight to lose each week is usually 1-2 lb (0.5-0.9 kg). This usually means that your daily calorie intake should be reduced by 500-750 calories.  Eating 1,200-1,500 calories a day can help most women lose weight.  Eating 1,500-1,800 calories a day can help most men lose weight.  What do I need to know about calorie counting?  Work with your health care provider or dietitian to determine how many calories you should get each day. To meet your daily calorie goal, you will need to:  Find out how many calories are in each food that you would like to eat. Try to do this before you eat.  Decide how much of the food you plan to eat.  Keep a food log. Do this by writing down what you ate and how many calories it had.  To successfully lose weight, it is important to balance calorie counting with a healthy lifestyle that includes regular activity.  Where do I find calorie information?  The number of calories in a food can be found on a Nutrition Facts label. If a food does not have a Nutrition Facts label, try to  look up the calories online or ask your dietitian for help.  Remember that calories are listed per serving. If you choose to have more than one serving of a food, you will have to multiply the calories per serving by the number of servings you plan to eat. For example, the label on a package of bread might say that a serving size is 1 slice and that there are 90 calories in a serving. If you eat 1 slice, you will have eaten 90 calories. If you eat 2 slices, you will have eaten 180 calories.  How do I keep a food log?  After each time that you eat, record the following in your food log as soon as possible:  What you ate. Be sure to include toppings, sauces, and other extras on the food.  How much you ate. This can be measured in cups, ounces, or number of items.  How many calories were in each food and drink.  The total number of calories in the food you ate.  Keep your food log near you, such as in a pocket-sized notebook or on an martin or website on your mobile phone. Some programs will calculate calories for you and show you how many calories you have left to meet your daily goal.  What are some portion-control tips?  Know how many calories are in a serving. This will help you know how many servings you can have of a certain food.  Use a measuring cup to measure serving sizes. You could also try weighing out portions on a kitchen scale. With time, you will be able to estimate serving sizes for some foods.  Take time to put servings of different foods on your favorite plates or in your favorite bowls and cups so you know what a serving looks like.  Try not to eat straight from a food's packaging, such as from a bag or box. Eating straight from the package makes it hard to see how much you are eating and can lead to overeating. Put the amount you would like to eat in a cup or on a plate to make sure you are eating the right portion.  Use smaller plates, glasses, and bowls for smaller portions and to prevent  overeating.  Try not to multitask. For example, avoid watching TV or using your computer while eating. If it is time to eat, sit down at a table and enjoy your food. This will help you recognize when you are full. It will also help you be more mindful of what and how much you are eating.  What are tips for following this plan?  Reading food labels  Check the calorie count compared with the serving size. The serving size may be smaller than what you are used to eating.  Check the source of the calories. Try to choose foods that are high in protein, fiber, and vitamins, and low in saturated fat, trans fat, and sodium.  Shopping  Read nutrition labels while you shop. This will help you make healthy decisions about which foods to buy.  Pay attention to nutrition labels for low-fat or fat-free foods. These foods sometimes have the same number of calories or more calories than the full-fat versions. They also often have added sugar, starch, or salt to make up for flavor that was removed with the fat.  Make a grocery list of lower-calorie foods and stick to it.  Cooking  Try to cook your favorite foods in a healthier way. For example, try baking instead of frying.  Use low-fat dairy products.  Meal planning  Use more fruits and vegetables. One-half of your plate should be fruits and vegetables.  Include lean proteins, such as chicken, turkey, and fish.  Lifestyle  Each week, aim to do one of the followin minutes of moderate exercise, such as walking.  75 minutes of vigorous exercise, such as running.  General information  Know how many calories are in the foods you eat most often. This will help you calculate calorie counts faster.  Find a way of tracking calories that works for you. Get creative. Try different apps or programs if writing down calories does not work for you.  What foods should I eat?    Eat nutritious foods. It is better to have a nutritious, high-calorie food, such as an avocado, than a food with  few nutrients, such as a bag of potato chips.  Use your calories on foods and drinks that will fill you up and will not leave you hungry soon after eating.  Examples of foods that fill you up are nuts and nut butters, vegetables, lean proteins, and high-fiber foods such as whole grains. High-fiber foods are foods with more than 5 g of fiber per serving.  Pay attention to calories in drinks. Low-calorie drinks include water and unsweetened drinks.  The items listed above may not be a complete list of foods and beverages you can eat. Contact a dietitian for more information.  What foods should I limit?  Limit foods or drinks that are not good sources of vitamins, minerals, or protein or that are high in unhealthy fats. These include:  Candy.  Other sweets.  Sodas, specialty coffee drinks, alcohol, and juice.  The items listed above may not be a complete list of foods and beverages you should avoid. Contact a dietitian for more information.  How do I count calories when eating out?  Pay attention to portions. Often, portions are much larger when eating out. Try these tips to keep portions smaller:  Consider sharing a meal instead of getting your own.  If you get your own meal, eat only half of it. Before you start eating, ask for a container and put half of your meal into it.  When available, consider ordering smaller portions from the menu instead of full portions.  Pay attention to your food and drink choices. Knowing the way food is cooked and what is included with the meal can help you eat fewer calories.  If calories are listed on the menu, choose the lower-calorie options.  Choose dishes that include vegetables, fruits, whole grains, low-fat dairy products, and lean proteins.  Choose items that are boiled, broiled, grilled, or steamed. Avoid items that are buttered, battered, fried, or served with cream sauce. Items labeled as crispy are usually fried, unless stated otherwise.  Choose water, low-fat milk,  unsweetened iced tea, or other drinks without added sugar. If you want an alcoholic beverage, choose a lower-calorie option, such as a glass of wine or light beer.  Ask for dressings, sauces, and syrups on the side. These are usually high in calories, so you should limit the amount you eat.  If you want a salad, choose a garden salad and ask for grilled meats. Avoid extra toppings such as shankar, cheese, or fried items. Ask for the dressing on the side, or ask for olive oil and vinegar or lemon to use as dressing.  Estimate how many servings of a food you are given. Knowing serving sizes will help you be aware of how much food you are eating at restaurants.  Where to find more information  Centers for Disease Control and Prevention: www.cdc.gov  [place] Department of Agriculture: myplate.gov  Summary  Calorie counting means keeping track of how many calories you eat and drink each day. If you eat fewer calories than your body needs, you should lose weight.  A healthy amount of weight to lose per week is usually 1-2 lb (0.5-0.9 kg). This usually means reducing your daily calorie intake by 500-750 calories.  The number of calories in a food can be found on a Nutrition Facts label. If a food does not have a Nutrition Facts label, try to look up the calories online or ask your dietitian for help.  Use smaller plates, glasses, and bowls for smaller portions and to prevent overeating.  Use your calories on foods and drinks that will fill you up and not leave you hungry shortly after a meal.  This information is not intended to replace advice given to you by your health care provider. Make sure you discuss any questions you have with your health care provider.  Document Revised: 01/28/2021 Document Reviewed: 01/28/2021  Elsevier Patient Education © 2022 [employer]    Exercising to Lose Weight  Getting regular exercise is important for everyone. It is especially important if you are overweight. Being overweight increases  your risk of heart disease, stroke, diabetes, high blood pressure, and several types of cancer. Exercising, and reducing the calories you consume, can help you lose weight and improve fitness and health.  Exercise can be moderate or vigorous intensity. To lose weight, most people need to do a certain amount of moderate or vigorous-intensity exercise each week.  How can exercise affect me?  You lose weight when you exercise enough to burn more calories than you eat. Exercise also reduces body fat and builds muscle. The more muscle you have, the more calories you burn. Exercise also:  Improves mood.  Reduces stress and tension.  Improves your overall fitness, flexibility, and endurance.  Increases bone strength.  Moderate-intensity exercise  Moderate-intensity exercise is any activity that gets you moving enough to burn at least three times more energy (calories) than if you were sitting.  Examples of moderate exercise include:  Walking a mile in 15 minutes.  Doing light yard work.  Biking at an easy pace.  Most people should get at least 150 minutes of moderate-intensity exercise a week to maintain their body weight.  Vigorous-intensity exercise  Vigorous-intensity exercise is any activity that gets you moving enough to burn at least six times more calories than if you were sitting. When you exercise at this intensity, you should be working hard enough that you are not able to carry on a conversation.  Examples of vigorous exercise include:  Running.  Playing a team sport, such as football, basketball, and soccer.  Jumping rope.  Most people should get at least 75 minutes a week of vigorous exercise to maintain their body weight.  What actions can I take to lose weight?  The amount of exercise you need to lose weight depends on:  Your age.  The type of exercise.  Any health conditions you have.  Your overall physical ability.  Talk to your health care provider about how much exercise you need and what types of  activities are safe for you.  Nutrition    Make changes to your diet as told by your health care provider or diet and nutrition specialist (dietitian). This may include:  Eating fewer calories.  Eating more protein.  Eating less unhealthy fats.  Eating a diet that includes fresh fruits and vegetables, whole grains, low-fat dairy products, and lean protein.  Avoiding foods with added fat, salt, and sugar.  Drink plenty of water while you exercise to prevent dehydration or heat stroke.  Activity  Choose an activity that you enjoy and set realistic goals. Your health care provider can help you make an exercise plan that works for you.  Exercise at a moderate or vigorous intensity most days of the week.  The intensity of exercise may vary from person to person. You can tell how intense a workout is for you by paying attention to your breathing and heartbeat. Most people will notice their breathing and heartbeat get faster with more intense exercise.  Do resistance training twice each week, such as:  Push-ups.  Sit-ups.  Lifting weights.  Using resistance bands.  Getting short amounts of exercise can be just as helpful as long, structured periods of exercise. If you have trouble finding time to exercise, try doing these things as part of your daily routine:  Get up, stretch, and walk around every 30 minutes throughout the day.  Go for a walk during your lunch break.  Park your car farther away from your destination.  If you take public transportation, get off one stop early and walk the rest of the way.  Make phone calls while standing up and walking around.  Take the stairs instead of elevators or escalators.  Wear comfortable clothes and shoes with good support.  Do not exercise so much that you hurt yourself, feel dizzy, or get very short of breath.  Where to find more information  [place] Department of Health and Human Services: www.hhs.gov  Centers for Disease Control and Prevention: www.cdc.gov  Contact a health care  provider:  Before starting a new exercise program.  If you have questions or concerns about your weight.  If you have a medical problem that keeps you from exercising.  Get help right away if:  You have any of the following while exercising:  Injury.  Dizziness.  Difficulty breathing or shortness of breath that does not go away when you stop exercising.  Chest pain.  Rapid heartbeat.  These symptoms may represent a serious problem that is an emergency. Do not wait to see if the symptoms will go away. Get medical help right away. Call your local emergency services (911 in the [place] Do not drive yourself to the hospital.  Summary  Getting regular exercise is especially important if you are overweight.  Being overweight increases your risk of heart disease, stroke, diabetes, high blood pressure, and several types of cancer.  Losing weight happens when you burn more calories than you eat.  Reducing the amount of calories you eat, and getting regular moderate or vigorous exercise each week, helps you lose weight.  This information is not intended to replace advice given to you by your health care provider. Make sure you discuss any questions you have with your health care provider.  Document Revised: 02/13/2022 Document Reviewed: 02/13/2022  Elsevier Patient Education © 2022 [employer] Please review the decision aid used during our discussion regarding the Low dose lung cancer screening visit today.

## 2024-11-19 NOTE — PROGRESS NOTES
Subjective   Miles Munoz is a 59 y.o. male. Presents today for   Chief Complaint   Patient presents with    Diabetes    Hyperlipidemia       History of Present Illness  Patient dm2, hld here for labs;  has amena on cpap;  losing weigh tintentionally;  quit smoking and breathing better;  no cp/soa;    Review of Systems   Respiratory:  Negative for shortness of breath.    Cardiovascular:  Negative for chest pain and palpitations.   Gastrointestinal:  Negative for abdominal pain.       Patient Active Problem List   Diagnosis    Occult blood positive stool    Gastroesophageal reflux disease    Tubulovillous adenoma of colon    Multiple gastric ulcers    Snores    Witnessed apneic spells    Obesity, morbid, BMI 40.0-49.9    Sleep-related bruxism    AMENA on CPAP    Hypersomnia with sleep apnea    Sleep related hypoxia    Hyperlipidemia    Palpitations    Personal history of colonic polyps    Calcaneal spur    Congenital pes cavus    Pain in limb    Plantar fascial fibromatosis    Prediabetes    Dyspepsia    Abdominal discomfort       Social History     Socioeconomic History    Marital status:    Tobacco Use    Smoking status: Former     Current packs/day: 0.00     Average packs/day: 1 pack/day for 46.2 years (46.2 ttl pk-yrs)     Types: Cigarettes     Start date: 10/5/1976     Quit date: 2022     Years since quittin.8     Passive exposure: Past    Smokeless tobacco: Never    Tobacco comments:     close to being ready   Vaping Use    Vaping status: Never Used   Substance and Sexual Activity    Alcohol use: Yes     Alcohol/week: 1.0 standard drink of alcohol     Types: 1 Drinks containing 0.5 oz of alcohol per week     Comment: rarely    Drug use: No    Sexual activity: Defer       Allergies   Allergen Reactions    Prednisone Other (See Comments)     Causes muscles to constrict        Current Outpatient Medications on File Prior to Visit   Medication Sig Dispense Refill    cetirizine (zyrTEC) 10 MG tablet  Take 1 tablet by mouth Every Morning.      ferrous gluconate (FERGON) 324 MG tablet TAKE 1 TABLET BY MOUTH ONCE DAILY WITH BREAKFAST FOR 90 DAYS 90 tablet 0    omeprazole (priLOSEC) 40 MG capsule Take 1 capsule by mouth Daily. 90 capsule 1    sildenafil (VIAGRA) 100 MG tablet Take 1 tablet by mouth Daily As Needed for Erectile Dysfunction. Instructed to withhold med 48 hrs prior to sx 30 tablet 5    [DISCONTINUED] atorvastatin (LIPITOR) 20 MG tablet Take 1 tablet by mouth once daily 90 tablet 3    [DISCONTINUED] meloxicam (MOBIC) 15 MG tablet Take 1 tablet by mouth once daily 90 tablet 0    [DISCONTINUED] Semaglutide, 2 MG/DOSE, (OZEMPIC) 8 MG/3ML solution pen-injector Inject 2 mg under the skin into the appropriate area as directed 1 (One) Time Per Week. 3 mL 12    [DISCONTINUED] tamsulosin (FLOMAX) 0.4 MG capsule 24 hr capsule Take 1 capsule by mouth once daily 30 capsule 11    [DISCONTINUED] Azelastine-Fluticasone 137-50 MCG/ACT suspension 1 spray into the nostril(s) as directed by provider 2 (Two) Times a Day. (Patient not taking: Reported on 11/19/2024) 23 g 12    [DISCONTINUED] ipratropium (ATROVENT) 0.06 % nasal spray 2 sprays into the nostril(s) as directed by provider 3 (Three) Times a Day. (Patient not taking: Reported on 11/19/2024)      [DISCONTINUED] mupirocin (BACTROBAN) 2 % ointment Apply 1 Application topically to the appropriate area as directed 3 (Three) Times a Day As Needed (as needed up to 3 times/day). (Patient not taking: Reported on 11/19/2024) 30 g 0    [DISCONTINUED] triazolam (HALCION) 0.25 MG tablet TAKE 1 TABLET BY MOUTH AT NIGHT THEN ONE TABLET IN THE OFFICE DAY OF PROCEDURE AND KEEP ADDITIONAL IF NECESSARY FOR APPOINTMENT (Patient not taking: Reported on 11/19/2024)       No current facility-administered medications on file prior to visit.       Objective   Vitals:    11/19/24 0814 11/19/24 0835   BP: 142/82 118/72   Pulse: 62    SpO2: 98%    Weight: 118 kg (260 lb)    Height: 170.2 cm  "(67\")      Body mass index is 40.72 kg/m².    Physical Exam  Vitals and nursing note reviewed.   Constitutional:       Appearance: He is well-developed.   Musculoskeletal:      Cervical back: Neck supple.   Skin:     General: Skin is warm and dry.   Neurological:      Mental Status: He is alert.   Psychiatric:         Behavior: Behavior normal.         Assessment & Plan   Diagnoses and all orders for this visit:    1. Type 2 diabetes mellitus without complication, without long-term current use of insulin (Primary)  -     Microalbumin / Creatinine Urine Ratio - Urine, Clean Catch  -     Lipid Panel  -     Comprehensive Metabolic Panel  -     Hemoglobin A1c  -     Semaglutide, 2 MG/DOSE, (OZEMPIC) 8 MG/3ML solution pen-injector; Inject 2 mg under the skin into the appropriate area as directed 1 (One) Time Per Week.  Dispense: 9 mL; Refill: 3    2. Mixed hyperlipidemia  -     Lipid Panel    3. MARIA A on CPAP    4. Drug therapy  -     CBC (No Diff)    5. Encounter for screening for lung cancer  -     CT chest low dose wo; Future    6. Personal history of tobacco use, presenting hazards to health  -     CT chest low dose wo; Future    7. Hyperlipidemia due to dietary fat intake  -     atorvastatin (LIPITOR) 20 MG tablet; Take 1 tablet by mouth Daily.  Dispense: 90 tablet; Refill: 3    8. Chronic bilateral low back pain without sciatica  -     meloxicam (MOBIC) 15 MG tablet; Take 1 tablet by mouth Daily.  Dispense: 90 tablet; Refill: 3    9. Primary osteoarthritis involving multiple joints  -     meloxicam (MOBIC) 15 MG tablet; Take 1 tablet by mouth Daily.  Dispense: 90 tablet; Refill: 3    10. Morbid (severe) obesity due to excess calories  -     Semaglutide, 2 MG/DOSE, (OZEMPIC) 8 MG/3ML solution pen-injector; Inject 2 mg under the skin into the appropriate area as directed 1 (One) Time Per Week.  Dispense: 9 mL; Refill: 3    Other orders  -     tamsulosin (FLOMAX) 0.4 MG capsule 24 hr capsule; Take 1 capsule by mouth " Daily.  Dispense: 90 capsule; Refill: 3    -dm2 - continue medications, recheck labs  -HLD - continue statin, recheck lipids.  -MARIA A - compliant with cpap most of the time, though skipped a few nights and medically benefit to continue.  -hx of anemia, will recheck  Discussed with long-term use of nsaids and concerns for renal, gi and cardiovascular risks.  Recommend use only prn and monitor renal function.                 -Follow up: 6 months and prn

## 2024-11-21 LAB
ALBUMIN/CREAT UR: 5 MG/G CREAT (ref 0–29)
CREAT UR-MCNC: 291.8 MG/DL
MICROALBUMIN UR-MCNC: 13.4 UG/ML

## 2024-11-24 DIAGNOSIS — K21.00 GASTROESOPHAGEAL REFLUX DISEASE WITH ESOPHAGITIS: ICD-10-CM

## 2024-11-24 NOTE — PROGRESS NOTES
Mail copy of results to patient.  Diabetes and cholesterol well controlled  Kidney and liver function normal  Blood counts normal

## 2024-11-25 RX ORDER — OMEPRAZOLE 40 MG/1
40 CAPSULE, DELAYED RELEASE ORAL DAILY
Qty: 90 CAPSULE | Refills: 0 | Status: SHIPPED | OUTPATIENT
Start: 2024-11-25

## 2025-02-16 DIAGNOSIS — K21.00 GASTROESOPHAGEAL REFLUX DISEASE WITH ESOPHAGITIS: ICD-10-CM

## 2025-02-17 RX ORDER — OMEPRAZOLE 40 MG/1
40 CAPSULE, DELAYED RELEASE ORAL DAILY
Qty: 90 CAPSULE | Refills: 0 | Status: SHIPPED | OUTPATIENT
Start: 2025-02-17

## 2025-03-13 DIAGNOSIS — J30.1 NON-SEASONAL ALLERGIC RHINITIS DUE TO POLLEN: ICD-10-CM

## 2025-03-14 RX ORDER — AZELASTINE HYDROCHLORIDE, FLUTICASONE PROPIONATE 137; 50 UG/1; UG/1
SPRAY, METERED NASAL
Qty: 23 G | Refills: 0 | OUTPATIENT
Start: 2025-03-14

## 2025-05-17 DIAGNOSIS — K21.00 GASTROESOPHAGEAL REFLUX DISEASE WITH ESOPHAGITIS: ICD-10-CM

## 2025-05-19 RX ORDER — OMEPRAZOLE 40 MG/1
40 CAPSULE, DELAYED RELEASE ORAL DAILY
Qty: 90 CAPSULE | Refills: 0 | OUTPATIENT
Start: 2025-05-19

## 2025-05-20 ENCOUNTER — OFFICE VISIT (OUTPATIENT)
Dept: FAMILY MEDICINE CLINIC | Facility: CLINIC | Age: 60
End: 2025-05-20
Payer: COMMERCIAL

## 2025-05-20 VITALS
HEART RATE: 62 BPM | OXYGEN SATURATION: 98 % | BODY MASS INDEX: 40.02 KG/M2 | DIASTOLIC BLOOD PRESSURE: 75 MMHG | HEIGHT: 67 IN | WEIGHT: 255 LBS | SYSTOLIC BLOOD PRESSURE: 122 MMHG

## 2025-05-20 DIAGNOSIS — E11.9 TYPE 2 DIABETES MELLITUS WITHOUT COMPLICATION, WITHOUT LONG-TERM CURRENT USE OF INSULIN: ICD-10-CM

## 2025-05-20 DIAGNOSIS — E66.9 OBESITY (BMI 30-39.9): ICD-10-CM

## 2025-05-20 DIAGNOSIS — Z00.00 WELLNESS EXAMINATION: Primary | ICD-10-CM

## 2025-05-20 DIAGNOSIS — K21.00 GASTROESOPHAGEAL REFLUX DISEASE WITH ESOPHAGITIS: ICD-10-CM

## 2025-05-20 DIAGNOSIS — E78.2 MIXED HYPERLIPIDEMIA: ICD-10-CM

## 2025-05-20 PROCEDURE — 99396 PREV VISIT EST AGE 40-64: CPT | Performed by: FAMILY MEDICINE

## 2025-05-20 RX ORDER — FLUTICASONE PROPIONATE 50 MCG
2 SPRAY, SUSPENSION (ML) NASAL DAILY
COMMUNITY
End: 2025-05-26 | Stop reason: SDUPTHER

## 2025-05-20 RX ORDER — NALTREXONE HYDROCHLORIDE 50 MG/1
25 TABLET, FILM COATED ORAL DAILY
Qty: 15 TABLET | Refills: 5 | Status: SHIPPED | OUTPATIENT
Start: 2025-05-20

## 2025-05-20 RX ORDER — BUPROPION HYDROCHLORIDE 100 MG/1
100 TABLET, EXTENDED RELEASE ORAL DAILY
Qty: 30 TABLET | Refills: 5 | Status: SHIPPED | OUTPATIENT
Start: 2025-05-20

## 2025-05-20 RX ORDER — OMEPRAZOLE 40 MG/1
40 CAPSULE, DELAYED RELEASE ORAL DAILY
Qty: 90 CAPSULE | Refills: 3 | Status: SHIPPED | OUTPATIENT
Start: 2025-05-20

## 2025-05-20 NOTE — PROGRESS NOTES
Subjective   Miles Munoz is a 59 y.o. male. Presents today for   Chief Complaint   Patient presents with    Annual Exam    Diabetes    Hyperlipidemia         History of Present Illness  History of Present Illness  The patient is a 59-year-old male with metabolic syndrome/diabetes and hyperlipidemia, presenting for a wellness exam today. Reviewing weights and past appointments, he was at 280 pounds on 06/12/2023 and as of today, he is 255 pounds, a 5-pound loss since 11/2024.    He reports a general sense of well-being and has experienced a weight loss of 5 pounds since his last visit. He continues his regimen of Ozempic, which he believes is beneficial as it has led to a decrease in his appetite. He has recently purchased bicycles for physical activity but finds it challenging to maintain this due to inclement weather conditions. He recalls a previous attempt to quit smoking using Wellbutrin, which was unsuccessful, but he managed to quit with the help of patches. He occasionally smokes cigarettes but finds the smell nauseating.    He mentions that his prescription for omeprazole was not approved by [facility], possibly due to it being prescribed by a gastroenterologist. He had previously canceled an appointment with the gastroenterologist. He has been managing his condition with over-the-counter omeprazole 40 mg but prefers a prescription due to cost considerations. He also notes that he has not received his 90-day supply refills and questions if this could be due to insurance coverage issues.    SOCIAL HISTORY  He is still smoking but finds it nauseating to be around cigarettes and smokes only occasionally.  Review of Systems   Respiratory:  Negative for shortness of breath.    Cardiovascular:  Negative for chest pain and palpitations.   Gastrointestinal:  Negative for abdominal pain.       Patient Active Problem List   Diagnosis    Occult blood positive stool    Gastroesophageal reflux disease    Tubulovillous  "adenoma of colon    Multiple gastric ulcers    Snores    Witnessed apneic spells    Obesity, morbid, BMI 40.0-49.9    Sleep-related bruxism    MARIA A on CPAP    Hypersomnia with sleep apnea    Sleep related hypoxia    Hyperlipidemia    Palpitations    Personal history of colonic polyps    Calcaneal spur    Congenital pes cavus    Pain in limb    Plantar fascial fibromatosis    Prediabetes    Dyspepsia    Abdominal discomfort       Social History     Socioeconomic History    Marital status:    Tobacco Use    Smoking status: Former     Current packs/day: 0.00     Average packs/day: 1 pack/day for 46.2 years (46.2 ttl pk-yrs)     Types: Cigarettes     Start date: 10/5/1976     Quit date: 2022     Years since quittin.3     Passive exposure: Past    Smokeless tobacco: Never    Tobacco comments:     close to being ready   Vaping Use    Vaping status: Never Used   Substance and Sexual Activity    Alcohol use: Yes     Alcohol/week: 1.0 standard drink of alcohol     Types: 1 Drinks containing 0.5 oz of alcohol per week     Comment: rarely    Drug use: No    Sexual activity: Defer       Allergies   Allergen Reactions    Prednisone Other (See Comments)     Causes muscles to constrict          Objective   Vitals:    25 1026 25 1058   BP: 146/74 122/75   Pulse: 62    SpO2: 98%    Weight: 116 kg (255 lb)    Height: 170.2 cm (67\")      Body mass index is 39.94 kg/m².  Weight Weight (kg) Weight (lbs) Weight Method Visit Report                 2025 115.667 kg 255 lb - Report   2024 117.935 kg 260 lb - Report   2024 119.659 kg 263 lb 12.8 oz - Report   2024 120.203 kg 265 lb - Report   2024 122.471 kg 270 lb - Report   3/15/2024 119.205 kg 262 lb 12.8 oz - Report   2024 125.193 kg 276 lb - Report   2024 124.376 kg 274 lb 3.2 oz Standing scale -   2023 122.471 kg 270 lb - Report   2023 126.554 kg 279 lb - Report   2023 125.193 kg 276 lb - Report   2023 " 127.007 kg 280 lb - Report     Physical Exam  Vitals and nursing note reviewed.   Constitutional:       Appearance: He is well-developed.   HENT:      Head: Normocephalic and atraumatic.   Neck:      Thyroid: No thyromegaly.      Vascular: No JVD.   Cardiovascular:      Rate and Rhythm: Normal rate and regular rhythm.      Heart sounds: Normal heart sounds. No murmur heard.     No friction rub. No gallop.   Pulmonary:      Effort: Pulmonary effort is normal. No respiratory distress.      Breath sounds: Normal breath sounds. No wheezing or rales.   Abdominal:      General: Bowel sounds are normal. There is no distension.      Palpations: Abdomen is soft.      Tenderness: There is no abdominal tenderness. There is no guarding or rebound.   Musculoskeletal:      Cervical back: Neck supple.   Skin:     General: Skin is warm and dry.   Neurological:      Mental Status: He is alert.      Gait: Gait normal.   Psychiatric:         Behavior: Behavior normal.       Physical Exam  Cardiovascular: Regular rate and rhythm, no murmurs, rubs, or gallops    Results       Assessment & Plan   Diagnoses and all orders for this visit:    1. Wellness examination (Primary)    2. Type 2 diabetes mellitus without complication, without long-term current use of insulin  -     Comprehensive Metabolic Panel  -     Lipid Panel  -     Hemoglobin A1c  -     Microalbumin / Creatinine Urine Ratio - Urine, Clean Catch    3. Mixed hyperlipidemia  -     Comprehensive Metabolic Panel  -     Lipid Panel    4. Obesity (BMI 30-39.9)  -     buPROPion SR (Wellbutrin SR) 100 MG 12 hr tablet; Take 1 tablet by mouth Daily.  Dispense: 30 tablet; Refill: 5  -     naltrexone (DEPADE) 50 MG tablet; Take 0.5 tablets by mouth Daily.  Dispense: 15 tablet; Refill: 5    5. Gastroesophageal reflux disease with esophagitis  -     omeprazole (priLOSEC) 40 MG capsule; Take 1 capsule by mouth Daily.  Dispense: 90 capsule; Refill: 3    Counseled on diet and exercise  Due  health maint labs today     Assessment & Plan  1. Metabolic syndrome/diabetes.  - Weight has decreased from 280 pounds on 06/12/2023 to 255 pounds as of today, with a 5-pound loss since 11/2024.  - Blood pressure is 122/85.  - Discussion about weight loss plateau and potential addition of Contrave (Wellbutrin/naltrexone) to current Ozempic regimen.  - Prescription for Contrave (Wellbutrin/naltrexone) will be provided to assist with further weight loss. Encouraged to continue biking and regular physical activity.    2. Hyperlipidemia.  - No specific changes in symptoms or weight noted.  - Blood pressure is 122/85.  - Advised to maintain current medication regimen, healthy diet, and exercise routine.  - No new medications or therapies prescribed.    3. Gastroesophageal reflux disease (GERD).  - Patient reports difficulty obtaining omeprazole due to a canceled appointment with the gastroenterologist.  - No specific physical exam findings discussed.  - Prescription for omeprazole 40 mg will be sent to the pharmacy with a year's worth of refills.  - Advised to continue taking omeprazole as prescribed.          -Follow up: 6 months and prn     ________________________________________  Aly Espinosa DO, MS    Current Outpatient Medications on File Prior to Visit   Medication Sig Dispense Refill    atorvastatin (LIPITOR) 20 MG tablet Take 1 tablet by mouth Daily. 90 tablet 3    cetirizine (zyrTEC) 10 MG tablet Take 1 tablet by mouth Every Morning.      fluticasone (FLONASE) 50 MCG/ACT nasal spray Administer 2 sprays into the nostril(s) as directed by provider Daily.      meloxicam (MOBIC) 15 MG tablet Take 1 tablet by mouth Daily. 90 tablet 3    Semaglutide, 2 MG/DOSE, (OZEMPIC) 8 MG/3ML solution pen-injector Inject 2 mg under the skin into the appropriate area as directed 1 (One) Time Per Week. 9 mL 3    sildenafil (VIAGRA) 100 MG tablet Take 1 tablet by mouth Daily As Needed for Erectile Dysfunction. Instructed to  withhold med 48 hrs prior to sx 30 tablet 5    tamsulosin (FLOMAX) 0.4 MG capsule 24 hr capsule Take 1 capsule by mouth Daily. 90 capsule 3    [DISCONTINUED] omeprazole (priLOSEC) 40 MG capsule Take 1 capsule by mouth once daily 90 capsule 0    [DISCONTINUED] ferrous gluconate (FERGON) 324 MG tablet TAKE 1 TABLET BY MOUTH ONCE DAILY WITH BREAKFAST FOR 90 DAYS (Patient not taking: Reported on 5/20/2025) 90 tablet 0     No current facility-administered medications on file prior to visit.

## 2025-05-21 LAB
ALBUMIN SERPL-MCNC: 4 G/DL (ref 3.5–5.2)
ALBUMIN/CREAT UR: 2 MG/G CREAT (ref 0–29)
ALBUMIN/GLOB SERPL: 1.7 G/DL
ALP SERPL-CCNC: 85 U/L (ref 39–117)
ALT SERPL-CCNC: 24 U/L (ref 1–41)
AST SERPL-CCNC: 18 U/L (ref 1–40)
BILIRUB SERPL-MCNC: 0.6 MG/DL (ref 0–1.2)
BUN SERPL-MCNC: 17 MG/DL (ref 6–20)
BUN/CREAT SERPL: 17.3 (ref 7–25)
CALCIUM SERPL-MCNC: 8.9 MG/DL (ref 8.6–10.5)
CHLORIDE SERPL-SCNC: 106 MMOL/L (ref 98–107)
CHOLEST SERPL-MCNC: 128 MG/DL (ref 0–200)
CO2 SERPL-SCNC: 27.2 MMOL/L (ref 22–29)
CREAT SERPL-MCNC: 0.98 MG/DL (ref 0.76–1.27)
CREAT UR-MCNC: 180 MG/DL
EGFRCR SERPLBLD CKD-EPI 2021: 88.8 ML/MIN/1.73
GLOBULIN SER CALC-MCNC: 2.4 GM/DL
GLUCOSE SERPL-MCNC: 87 MG/DL (ref 65–99)
HBA1C MFR BLD: 5.2 % (ref 4.8–5.6)
HDLC SERPL-MCNC: 43 MG/DL (ref 40–60)
LDLC SERPL CALC-MCNC: 68 MG/DL (ref 0–100)
MICROALBUMIN UR-MCNC: 4.1 UG/ML
POTASSIUM SERPL-SCNC: 4 MMOL/L (ref 3.5–5.2)
PROT SERPL-MCNC: 6.4 G/DL (ref 6–8.5)
SODIUM SERPL-SCNC: 143 MMOL/L (ref 136–145)
TRIGL SERPL-MCNC: 85 MG/DL (ref 0–150)
VLDLC SERPL CALC-MCNC: 17 MG/DL (ref 5–40)

## 2025-05-26 ENCOUNTER — RESULTS FOLLOW-UP (OUTPATIENT)
Dept: FAMILY MEDICINE CLINIC | Facility: CLINIC | Age: 60
End: 2025-05-26
Payer: COMMERCIAL

## 2025-05-26 RX ORDER — FLUTICASONE PROPIONATE 50 MCG
2 SPRAY, SUSPENSION (ML) NASAL DAILY
Qty: 16 G | Refills: 3 | Status: SHIPPED | OUTPATIENT
Start: 2025-05-26

## 2025-05-26 NOTE — LETTER
Miles Munoz  [address] [facility] [id number]    May 26, 2025     Dear Mr. Munoz:    Below are the results from your recent visit:    Normalized A1c on glp1, continue  Cholesterol is adequately controlled.  Rest of labs normal or stable.    Resulted Orders   Comprehensive Metabolic Panel   Result Value Ref Range    Glucose 87 65 - 99 mg/dL    BUN 17 6 - 20 mg/dL    Creatinine 0.98 0.76 - 1.27 mg/dL    EGFR Result 88.8 >60.0 mL/min/1.73      Comment:      GFR Categories in Chronic Kidney Disease (CKD)  GFR Category          GFR (mL/min/1.73)    Interpretation  G1                    90 or greater        Normal or high  (1)  G2                    60-89                Mild decrease  (1)  G3a                   45-59                Mild to moderate  decrease  G3b                   30-44                Moderate to  severe decrease  G4                    15-29                Severe decrease  G5                    14 or less           Kidney failure  (1)In the absence of evidence of kidney disease, neither  GFR category G1 or G2 fulfill the criteria for CKD.  eGFR calculation 2021 CKD-EPI creatinine equation, which  does not include race as a factor      BUN/Creatinine Ratio 17.3 7.0 - 25.0    Sodium 143 136 - 145 mmol/L    Potassium 4.0 3.5 - 5.2 mmol/L    Chloride 106 98 - 107 mmol/L    Total CO2 27.2 22.0 - 29.0 mmol/L    Calcium 8.9 8.6 - 10.5 mg/dL    Total Protein 6.4 6.0 - 8.5 g/dL    Albumin 4.0 3.5 - 5.2 g/dL    Globulin 2.4 gm/dL    A/G Ratio 1.7 g/dL    Total Bilirubin 0.6 0.0 - 1.2 mg/dL    Alkaline Phosphatase 85 39 - 117 U/L    AST (SGOT) 18 1 - 40 U/L    ALT (SGPT) 24 1 - 41 U/L   Lipid Panel   Result Value Ref Range    Total Cholesterol 128 0 - 200 mg/dL      Comment:      Cholesterol Reference Ranges  (U.S. Department of Health and Human Services ATP III  Classifications)  Desirable          <200 mg/dL  Borderline High    200-239 mg/dL  High Risk          >240 mg/dL  Triglyceride Reference  Ranges  (U.S. Department of Health and Human Services ATP III  Classifications)  Normal           <150 mg/dL  Borderline High  150-199 mg/dL  High             200-499 mg/dL  Very High        >500 mg/dL  HDL Reference Ranges  (U.S. Department of Health and Human Services ATP III  Classifications)  Low     <40 mg/dl (major risk factor for CHD)  High    >60 mg/dl ('negative' risk factor for CHD)  LDL Reference Ranges  (U.S. Department of Health and Human Services ATP III  Classifications)  Optimal          <100 mg/dL  Near Optimal     100-129 mg/dL  Borderline High  130-159 mg/dL  High             160-189 mg/dL  Very High        >189 mg/dL  LDL is calculated using the NIH LDL-C calculation.      Triglycerides 85 0 - 150 mg/dL    HDL Cholesterol 43 40 - 60 mg/dL    VLDL Cholesterol Taj 17 5 - 40 mg/dL    LDL Chol Calc ([facility]) 68 0 - 100 mg/dL   Hemoglobin A1c   Result Value Ref Range    Hemoglobin A1C 5.20 4.80 - 5.60 %      Comment:      Hemoglobin A1C Ranges:  Increased Risk for Diabetes  5.7% to 6.4%  Diabetes                     >= 6.5%  Diabetic Goal                < 7.0%     Microalbumin / Creatinine Urine Ratio - Urine, Clean Catch   Result Value Ref Range    Creatinine, Urine 180.0 Not Estab. mg/dL    Microalbumin, Urine 4.1 Not Estab. ug/mL    Microalbumin/Creatinine Ratio 2 0 - 29 mg/g creat      Comment:                             Normal:                0 -  29                         Moderately increased: 30 - 300                         Severely increased:       >300       If you have any questions or concerns, please don't hesitate to call.    Sincerely,        Aly Espinosa, DO

## 2025-05-26 NOTE — PROGRESS NOTES
Mail results to patient  Normalized A1c on glp1, continue  Cholesterol is adequately controlled.  Rest of labs normal or stable.

## 2025-08-04 ENCOUNTER — OFFICE VISIT (OUTPATIENT)
Dept: SLEEP MEDICINE | Facility: HOSPITAL | Age: 60
End: 2025-08-04
Payer: COMMERCIAL

## 2025-08-04 VITALS
HEIGHT: 67 IN | HEART RATE: 58 BPM | SYSTOLIC BLOOD PRESSURE: 153 MMHG | OXYGEN SATURATION: 95 % | BODY MASS INDEX: 40.18 KG/M2 | WEIGHT: 256 LBS | DIASTOLIC BLOOD PRESSURE: 79 MMHG

## 2025-08-04 DIAGNOSIS — G47.33 OSA (OBSTRUCTIVE SLEEP APNEA): Primary | ICD-10-CM

## 2025-08-04 DIAGNOSIS — E66.01 MORBID OBESITY WITH BODY MASS INDEX OF 40.0-49.9: ICD-10-CM

## 2025-08-04 PROCEDURE — G0463 HOSPITAL OUTPT CLINIC VISIT: HCPCS

## (undated) DEVICE — BNDG ELAS ELITE V/CLOSE 3IN 5YD LF STRL

## (undated) DEVICE — SPLNT ORTHOGLASS 3INX15FT SN

## (undated) DEVICE — BANDAGE,GAUZE,BULKEE II,4.5"X4.1YD,STRL: Brand: MEDLINE

## (undated) DEVICE — PK ORTHO MINOR TOWER 40

## (undated) DEVICE — DRSNG WND GZ CURAD OIL EMULSION 3X3IN STRL

## (undated) DEVICE — SYR LL TP 10ML STRL

## (undated) DEVICE — ELECTRD NDL EZ CLN MOD 2.75IN

## (undated) DEVICE — SNAR POLYP SENSATION STDOVL 27 240 BX40

## (undated) DEVICE — TUBING, SUCTION, 1/4" X 10', STRAIGHT: Brand: MEDLINE

## (undated) DEVICE — CANN NASL CO2 TRULINK W/O2 A/

## (undated) DEVICE — GLV SURG BIOGEL LTX PF 7 1/2

## (undated) DEVICE — LN SMPL CO2 SHTRM SD STREAM W/M LUER

## (undated) DEVICE — TBG 02 CRUSH RESIST LF CLR 7FT

## (undated) DEVICE — SENSR O2 OXIMAX FNGR A/ 18IN NONSTR

## (undated) DEVICE — CANN O2 ETCO2 FITS ALL CONN CO2 SMPL A/ 7IN DISP LF

## (undated) DEVICE — BITEBLOCK OMNI BLOC

## (undated) DEVICE — GAUZE,SPONGE,FLUFF,6"X6.75",STRL,10/TRAY: Brand: MEDLINE

## (undated) DEVICE — KT ORCA ORCAPOD DISP STRL

## (undated) DEVICE — GOWN,NON-REINFORCED,SIRUS,SET IN SLV,XXL: Brand: MEDLINE

## (undated) DEVICE — SINGLE-USE BIOPSY FORCEPS: Brand: RADIAL JAW 4

## (undated) DEVICE — DRSNG ADAPTIC 3X8

## (undated) DEVICE — THE TORRENT IRRIGATION SCOPE CONNECTOR IS USED WITH THE TORRENT IRRIGATION TUBING TO PROVIDE IRRIGATION FLUIDS SUCH AS STERILE WATER DURING GASTROINTESTINAL ENDOSCOPIC PROCEDURES WHEN USED IN CONJUNCTION WITH AN IRRIGATION PUMP (OR ELECTROSURGICAL UNIT).: Brand: TORRENT

## (undated) DEVICE — ADAPT CLN BIOGUARD AIR/H2O DISP

## (undated) DEVICE — DISPOSABLE TOURNIQUET CUFF DUAL BLADDER, SINGLE PORT AND QUICK CONNECT CONNECTOR: Brand: COLOR CUFF

## (undated) DEVICE — ARM SLING: Brand: DEROYAL

## (undated) DEVICE — TBG SXN CONN/F UNIV 1/4IN 10FT LF STRL

## (undated) DEVICE — SUT ETHLN 5/0 P3 18IN 698G

## (undated) DEVICE — THE CARR-LOCKE INJECTION NEEDLE IS A SINGLE USE, DISPOSABLE, FLEXIBLE SHEATH INJECTION NEEDLE USED FOR THE INJECTION OF VARIOUS TYPES OF MEDIA THROUGH FLEXIBLE ENDOSCOPES.

## (undated) DEVICE — SKIN PREP TRAY W/CHG: Brand: MEDLINE INDUSTRIES, INC.

## (undated) DEVICE — BLCK/BITE BLOX W/DENTL/RIM W/STRAP 54F

## (undated) DEVICE — Device: Brand: DEFENDO AIR/WATER/SUCTION AND BIOPSY VALVE